# Patient Record
Sex: MALE | Race: WHITE | NOT HISPANIC OR LATINO | Employment: OTHER | URBAN - NONMETROPOLITAN AREA
[De-identification: names, ages, dates, MRNs, and addresses within clinical notes are randomized per-mention and may not be internally consistent; named-entity substitution may affect disease eponyms.]

---

## 2017-09-08 ENCOUNTER — HOSPITAL ENCOUNTER (EMERGENCY)
Facility: HOSPITAL | Age: 47
Discharge: HOME | End: 2017-09-08
Attending: FAMILY MEDICINE | Admitting: FAMILY MEDICINE

## 2017-09-08 ENCOUNTER — APPOINTMENT (OUTPATIENT)
Dept: GENERAL RADIOLOGY | Facility: HOSPITAL | Age: 47
End: 2017-09-08

## 2017-09-08 VITALS
OXYGEN SATURATION: 99 % | DIASTOLIC BLOOD PRESSURE: 72 MMHG | RESPIRATION RATE: 16 BRPM | WEIGHT: 135 LBS | HEIGHT: 67 IN | BODY MASS INDEX: 21.19 KG/M2 | HEART RATE: 80 BPM | TEMPERATURE: 98.3 F | SYSTOLIC BLOOD PRESSURE: 112 MMHG

## 2017-09-08 DIAGNOSIS — S90.31XA CONTUSION OF RIGHT FOOT, INITIAL ENCOUNTER: Primary | ICD-10-CM

## 2017-09-08 PROCEDURE — 99283 EMERGENCY DEPT VISIT LOW MDM: CPT

## 2017-09-08 PROCEDURE — 73630 X-RAY EXAM OF FOOT: CPT

## 2017-09-08 RX ORDER — HYDROCODONE BITARTRATE AND ACETAMINOPHEN 10; 325 MG/1; MG/1
1 TABLET ORAL EVERY 6 HOURS PRN
COMMUNITY
End: 2022-06-01

## 2017-09-08 RX ORDER — ALBUTEROL SULFATE 90 UG/1
2 AEROSOL, METERED RESPIRATORY (INHALATION) EVERY 4 HOURS PRN
COMMUNITY
End: 2022-09-16

## 2017-09-09 NOTE — ED PROVIDER NOTES
Subjective   HPI Comments: Patient presents today for foot injury.  Patient reports that he was carrying a plate when he dropped it onto his right foot.  He states that this happened approximately 7.5 hours ago.  He states that he has taken several of his Norco was to help with the pain.  Patient reports that it is difficult to bear weight on this foot.  The Norco was only helped for a few minutes each time he takes one according to him.  This to be noted that the patient is a terrible historian who is not able to give me a consistent story on any questioning.      History provided by:  Patient   used: No        Review of Systems   Constitutional: Negative for activity change, chills, fatigue and fever.   HENT: Negative for congestion and dental problem.    Eyes: Negative for pain, discharge and itching.   Respiratory: Negative for apnea and chest tightness.    Cardiovascular: Negative for chest pain and palpitations.   Gastrointestinal: Negative for abdominal pain, diarrhea, nausea and vomiting.   Endocrine: Negative for polydipsia and polyuria.   Genitourinary: Negative for difficulty urinating and dysuria.   Musculoskeletal: Negative for arthralgias, neck pain and neck stiffness.        As per the HPI otherwise negative   Neurological: Negative for dizziness and headaches.   Hematological: Negative for adenopathy. Does not bruise/bleed easily.   Psychiatric/Behavioral: Negative for agitation and behavioral problems.   All other systems reviewed and are negative.      Past Medical History:   Diagnosis Date   • AIDS    • Hep C w/ coma, chronic        Allergies   Allergen Reactions   • Bactrim [Sulfamethoxazole-Trimethoprim]    • Other      SOME HIV MEDICATION, DONT KNOW NAME        Past Surgical History:   Procedure Laterality Date   • APPENDECTOMY         History reviewed. No pertinent family history.    Social History     Social History   • Marital status:      Spouse name: N/A   •  "Number of children: N/A   • Years of education: N/A     Social History Main Topics   • Smoking status: Current Every Day Smoker     Packs/day: 1.00   • Smokeless tobacco: None   • Alcohol use No   • Drug use: No   • Sexual activity: Defer     Other Topics Concern   • None     Social History Narrative   • None       Lab Results (last 24 hours)     ** No results found for the last 24 hours. **          Objective   Physical Exam   Constitutional: He is oriented to person, place, and time. He appears well-developed and well-nourished.   HENT:   Head: Normocephalic and atraumatic.   Eyes: Conjunctivae and EOM are normal. Pupils are equal, round, and reactive to light.   Neck: Normal range of motion. Neck supple.   Cardiovascular: Normal rate and regular rhythm.    Pulmonary/Chest: Effort normal and breath sounds normal.   Abdominal: Soft. Bowel sounds are normal.   Musculoskeletal: Normal range of motion. He exhibits tenderness. He exhibits no edema or deformity.   Neurological: He is alert and oriented to person, place, and time.   Skin: Skin is warm and dry.   Nursing note and vitals reviewed.      Procedures         XR Foot 3+ View Right   Final Result   1. No acute bony abnormality is seen.                           This report was finalized on 09/08/2017 18:11 by Dr. Damir Chong MD.          /76 (BP Location: Right arm, Patient Position: Sitting)  Pulse 78  Temp 97.2 °F (36.2 °C) (Temporal Artery )   Resp 16  Ht 67\" (170.2 cm)  Wt 135 lb (61.2 kg)  SpO2 99%  BMI 21.14 kg/m2    ED Course    ED Course       Medications - No data to display         MDM  Number of Diagnoses or Management Options  Contusion of right foot, initial encounter: new and requires workup     Amount and/or Complexity of Data Reviewed  Tests in the radiology section of CPT®: ordered and reviewed  Independent visualization of images, tracings, or specimens: yes    Risk of Complications, Morbidity, and/or Mortality  Presenting " problems: low  Diagnostic procedures: low  Management options: low  General comments: Will follow-up with his primary care provider.  I have given him crutches and an or so she to wear until he is able to follow-up.  I have encouraged him to make sure that he gets a repeat x-ray within the next week.    Patient Progress  Patient progress: stable      Final diagnoses:   Contusion of right foot, initial encounter          Chela Painting,   09/08/17 9143

## 2022-03-25 ENCOUNTER — HOSPITAL ENCOUNTER (EMERGENCY)
Facility: HOSPITAL | Age: 52
Discharge: HOME | End: 2022-03-26
Attending: EMERGENCY MEDICINE | Admitting: EMERGENCY MEDICINE

## 2022-03-25 ENCOUNTER — APPOINTMENT (OUTPATIENT)
Dept: GENERAL RADIOLOGY | Facility: HOSPITAL | Age: 52
End: 2022-03-25

## 2022-03-25 DIAGNOSIS — R07.9 CHEST PAIN, UNSPECIFIED TYPE: Primary | ICD-10-CM

## 2022-03-25 LAB
ALBUMIN SERPL-MCNC: 4.2 G/DL (ref 3.5–5.2)
ALBUMIN/GLOB SERPL: 1.2 G/DL
ALP SERPL-CCNC: 80 U/L (ref 39–117)
ALT SERPL W P-5'-P-CCNC: 52 U/L (ref 1–41)
ANION GAP SERPL CALCULATED.3IONS-SCNC: 11 MMOL/L (ref 5–15)
AST SERPL-CCNC: 40 U/L (ref 1–40)
BASOPHILS # BLD AUTO: 0.09 10*3/MM3 (ref 0–0.2)
BASOPHILS NFR BLD AUTO: 1.1 % (ref 0–1.5)
BILIRUB SERPL-MCNC: 0.3 MG/DL (ref 0–1.2)
BUN SERPL-MCNC: 12 MG/DL (ref 6–20)
BUN/CREAT SERPL: 9.9 (ref 7–25)
CALCIUM SPEC-SCNC: 9.1 MG/DL (ref 8.6–10.5)
CHLORIDE SERPL-SCNC: 105 MMOL/L (ref 98–107)
CO2 SERPL-SCNC: 22 MMOL/L (ref 22–29)
CREAT SERPL-MCNC: 1.21 MG/DL (ref 0.76–1.27)
DEPRECATED RDW RBC AUTO: 46.2 FL (ref 37–54)
EGFRCR SERPLBLD CKD-EPI 2021: 71.6 ML/MIN/1.73
EOSINOPHIL # BLD AUTO: 0.31 10*3/MM3 (ref 0–0.4)
EOSINOPHIL NFR BLD AUTO: 3.8 % (ref 0.3–6.2)
ERYTHROCYTE [DISTWIDTH] IN BLOOD BY AUTOMATED COUNT: 13.7 % (ref 12.3–15.4)
GLOBULIN UR ELPH-MCNC: 3.6 GM/DL
GLUCOSE SERPL-MCNC: 105 MG/DL (ref 65–99)
HCT VFR BLD AUTO: 45 % (ref 37.5–51)
HGB BLD-MCNC: 15.1 G/DL (ref 13–17.7)
IMM GRANULOCYTES # BLD AUTO: 0.02 10*3/MM3 (ref 0–0.05)
IMM GRANULOCYTES NFR BLD AUTO: 0.2 % (ref 0–0.5)
LYMPHOCYTES # BLD AUTO: 3.99 10*3/MM3 (ref 0.7–3.1)
LYMPHOCYTES NFR BLD AUTO: 48.5 % (ref 19.6–45.3)
MCH RBC QN AUTO: 30.8 PG (ref 26.6–33)
MCHC RBC AUTO-ENTMCNC: 33.6 G/DL (ref 31.5–35.7)
MCV RBC AUTO: 91.6 FL (ref 79–97)
MONOCYTES # BLD AUTO: 0.75 10*3/MM3 (ref 0.1–0.9)
MONOCYTES NFR BLD AUTO: 9.1 % (ref 5–12)
NEUTROPHILS NFR BLD AUTO: 3.06 10*3/MM3 (ref 1.7–7)
NEUTROPHILS NFR BLD AUTO: 37.3 % (ref 42.7–76)
NRBC BLD AUTO-RTO: 0 /100 WBC (ref 0–0.2)
PLATELET # BLD AUTO: 214 10*3/MM3 (ref 140–450)
PMV BLD AUTO: 9.3 FL (ref 6–12)
POTASSIUM SERPL-SCNC: 4 MMOL/L (ref 3.5–5.2)
PROT SERPL-MCNC: 7.8 G/DL (ref 6–8.5)
RBC # BLD AUTO: 4.91 10*6/MM3 (ref 4.14–5.8)
SODIUM SERPL-SCNC: 138 MMOL/L (ref 136–145)
TROPONIN T SERPL-MCNC: <0.01 NG/ML (ref 0–0.03)
WBC NRBC COR # BLD: 8.22 10*3/MM3 (ref 3.4–10.8)

## 2022-03-25 PROCEDURE — 71045 X-RAY EXAM CHEST 1 VIEW: CPT

## 2022-03-25 PROCEDURE — 85730 THROMBOPLASTIN TIME PARTIAL: CPT | Performed by: EMERGENCY MEDICINE

## 2022-03-25 PROCEDURE — 93005 ELECTROCARDIOGRAM TRACING: CPT

## 2022-03-25 PROCEDURE — 84484 ASSAY OF TROPONIN QUANT: CPT | Performed by: EMERGENCY MEDICINE

## 2022-03-25 PROCEDURE — 84145 PROCALCITONIN (PCT): CPT | Performed by: EMERGENCY MEDICINE

## 2022-03-25 PROCEDURE — 85610 PROTHROMBIN TIME: CPT | Performed by: EMERGENCY MEDICINE

## 2022-03-25 PROCEDURE — 81003 URINALYSIS AUTO W/O SCOPE: CPT | Performed by: EMERGENCY MEDICINE

## 2022-03-25 PROCEDURE — 83690 ASSAY OF LIPASE: CPT | Performed by: EMERGENCY MEDICINE

## 2022-03-25 PROCEDURE — 80306 DRUG TEST PRSMV INSTRMNT: CPT | Performed by: EMERGENCY MEDICINE

## 2022-03-25 PROCEDURE — 99284 EMERGENCY DEPT VISIT MOD MDM: CPT

## 2022-03-25 PROCEDURE — 85379 FIBRIN DEGRADATION QUANT: CPT | Performed by: EMERGENCY MEDICINE

## 2022-03-25 PROCEDURE — 83880 ASSAY OF NATRIURETIC PEPTIDE: CPT | Performed by: EMERGENCY MEDICINE

## 2022-03-25 PROCEDURE — 82077 ASSAY SPEC XCP UR&BREATH IA: CPT | Performed by: EMERGENCY MEDICINE

## 2022-03-25 PROCEDURE — 99283 EMERGENCY DEPT VISIT LOW MDM: CPT

## 2022-03-25 PROCEDURE — 85025 COMPLETE CBC W/AUTO DIFF WBC: CPT | Performed by: EMERGENCY MEDICINE

## 2022-03-25 PROCEDURE — 86140 C-REACTIVE PROTEIN: CPT | Performed by: EMERGENCY MEDICINE

## 2022-03-25 PROCEDURE — 93010 ELECTROCARDIOGRAM REPORT: CPT | Performed by: INTERNAL MEDICINE

## 2022-03-25 PROCEDURE — 80053 COMPREHEN METABOLIC PANEL: CPT | Performed by: EMERGENCY MEDICINE

## 2022-03-25 PROCEDURE — 93005 ELECTROCARDIOGRAM TRACING: CPT | Performed by: EMERGENCY MEDICINE

## 2022-03-25 RX ORDER — ASPIRIN 81 MG/1
324 TABLET, CHEWABLE ORAL ONCE
Status: COMPLETED | OUTPATIENT
Start: 2022-03-25 | End: 2022-03-25

## 2022-03-25 RX ORDER — SODIUM CHLORIDE 0.9 % (FLUSH) 0.9 %
10 SYRINGE (ML) INJECTION AS NEEDED
Status: DISCONTINUED | OUTPATIENT
Start: 2022-03-25 | End: 2022-03-26 | Stop reason: HOSPADM

## 2022-03-25 RX ADMIN — ASPIRIN 324 MG: 81 TABLET, CHEWABLE ORAL at 23:34

## 2022-03-26 ENCOUNTER — APPOINTMENT (OUTPATIENT)
Dept: CT IMAGING | Facility: HOSPITAL | Age: 52
End: 2022-03-26

## 2022-03-26 VITALS
WEIGHT: 148 LBS | HEIGHT: 67 IN | RESPIRATION RATE: 20 BRPM | TEMPERATURE: 98.1 F | SYSTOLIC BLOOD PRESSURE: 128 MMHG | BODY MASS INDEX: 23.23 KG/M2 | HEART RATE: 69 BPM | OXYGEN SATURATION: 95 % | DIASTOLIC BLOOD PRESSURE: 77 MMHG

## 2022-03-26 LAB
AMPHET+METHAMPHET UR QL: NEGATIVE
AMPHETAMINES UR QL: NEGATIVE
APTT PPP: 25.7 SECONDS (ref 24.1–35)
BARBITURATES UR QL SCN: NEGATIVE
BENZODIAZ UR QL SCN: NEGATIVE
BILIRUB UR QL STRIP: NEGATIVE
BUPRENORPHINE SERPL-MCNC: NEGATIVE NG/ML
CANNABINOIDS SERPL QL: NEGATIVE
CLARITY UR: CLEAR
COCAINE UR QL: NEGATIVE
COLOR UR: YELLOW
CRP SERPL-MCNC: <0.3 MG/DL (ref 0–0.5)
D DIMER PPP FEU-MCNC: 0.62 MG/L (FEU) (ref 0–0.5)
D-LACTATE SERPL-SCNC: 1 MMOL/L (ref 0.5–2)
ETHANOL UR QL: <0.01 %
GLUCOSE UR STRIP-MCNC: NEGATIVE MG/DL
HGB UR QL STRIP.AUTO: NEGATIVE
HOLD SPECIMEN: NORMAL
HOLD SPECIMEN: NORMAL
INR PPP: 0.9 (ref 0.91–1.09)
KETONES UR QL STRIP: NEGATIVE
LEUKOCYTE ESTERASE UR QL STRIP.AUTO: NEGATIVE
LIPASE SERPL-CCNC: 100 U/L (ref 13–60)
METHADONE UR QL SCN: NEGATIVE
NITRITE UR QL STRIP: NEGATIVE
NT-PROBNP SERPL-MCNC: 19.6 PG/ML (ref 0–900)
OPIATES UR QL: NEGATIVE
OXYCODONE UR QL SCN: NEGATIVE
PCP UR QL SCN: NEGATIVE
PH UR STRIP.AUTO: 5.5 [PH] (ref 5–8)
PROCALCITONIN SERPL-MCNC: <0.2 NG/ML (ref 0–0.25)
PROPOXYPH UR QL: NEGATIVE
PROT UR QL STRIP: NEGATIVE
PROTHROMBIN TIME: 11.8 SECONDS (ref 11.9–14.6)
SP GR UR STRIP: 1.01 (ref 1–1.03)
TRICYCLICS UR QL SCN: NEGATIVE
TROPONIN T SERPL-MCNC: <0.01 NG/ML (ref 0–0.03)
UROBILINOGEN UR QL STRIP: NORMAL
WHOLE BLOOD HOLD SPECIMEN: NORMAL
WHOLE BLOOD HOLD SPECIMEN: NORMAL

## 2022-03-26 PROCEDURE — 93010 ELECTROCARDIOGRAM REPORT: CPT | Performed by: INTERNAL MEDICINE

## 2022-03-26 PROCEDURE — 0 IOPAMIDOL PER 1 ML: Performed by: EMERGENCY MEDICINE

## 2022-03-26 PROCEDURE — 83605 ASSAY OF LACTIC ACID: CPT | Performed by: EMERGENCY MEDICINE

## 2022-03-26 PROCEDURE — 71275 CT ANGIOGRAPHY CHEST: CPT

## 2022-03-26 PROCEDURE — 84484 ASSAY OF TROPONIN QUANT: CPT | Performed by: EMERGENCY MEDICINE

## 2022-03-26 PROCEDURE — 93005 ELECTROCARDIOGRAM TRACING: CPT | Performed by: EMERGENCY MEDICINE

## 2022-03-26 RX ADMIN — IOPAMIDOL 100 ML: 755 INJECTION, SOLUTION INTRAVENOUS at 01:19

## 2022-03-28 LAB
QT INTERVAL: 392 MS
QT INTERVAL: 442 MS
QTC INTERVAL: 429 MS
QTC INTERVAL: 430 MS

## 2022-03-30 ENCOUNTER — TRANSCRIBE ORDERS (OUTPATIENT)
Dept: ADMINISTRATIVE | Facility: HOSPITAL | Age: 52
End: 2022-03-30

## 2022-03-30 DIAGNOSIS — B18.2 HEPATITIS C CARRIER: Primary | ICD-10-CM

## 2022-04-01 ENCOUNTER — APPOINTMENT (OUTPATIENT)
Dept: ULTRASOUND IMAGING | Facility: HOSPITAL | Age: 52
End: 2022-04-01

## 2022-04-06 ENCOUNTER — HOSPITAL ENCOUNTER (OUTPATIENT)
Dept: ULTRASOUND IMAGING | Facility: HOSPITAL | Age: 52
Discharge: HOME | End: 2022-04-06
Admitting: PHYSICIAN ASSISTANT

## 2022-04-06 ENCOUNTER — APPOINTMENT (OUTPATIENT)
Dept: ULTRASOUND IMAGING | Facility: HOSPITAL | Age: 52
End: 2022-04-06

## 2022-04-06 DIAGNOSIS — B18.2 HEPATITIS C CARRIER: ICD-10-CM

## 2022-04-06 PROCEDURE — 76981 USE PARENCHYMA: CPT

## 2022-04-06 PROCEDURE — 76705 ECHO EXAM OF ABDOMEN: CPT

## 2022-05-04 ENCOUNTER — OFFICE VISIT (OUTPATIENT)
Dept: CARDIOLOGY | Facility: CLINIC | Age: 52
End: 2022-05-04

## 2022-05-04 VITALS
HEIGHT: 67 IN | BODY MASS INDEX: 23.07 KG/M2 | WEIGHT: 147 LBS | HEART RATE: 76 BPM | DIASTOLIC BLOOD PRESSURE: 70 MMHG | SYSTOLIC BLOOD PRESSURE: 104 MMHG | OXYGEN SATURATION: 98 %

## 2022-05-04 DIAGNOSIS — Z82.49 FAMILY HISTORY OF EARLY CAD: ICD-10-CM

## 2022-05-04 DIAGNOSIS — R07.9 CHEST PAIN IN ADULT: Primary | ICD-10-CM

## 2022-05-04 DIAGNOSIS — R06.09 DOE (DYSPNEA ON EXERTION): ICD-10-CM

## 2022-05-04 DIAGNOSIS — G89.29 CHRONIC MIDLINE LOW BACK PAIN WITHOUT SCIATICA: ICD-10-CM

## 2022-05-04 DIAGNOSIS — M54.50 CHRONIC MIDLINE LOW BACK PAIN WITHOUT SCIATICA: ICD-10-CM

## 2022-05-04 DIAGNOSIS — B20 CURRENTLY ASYMPTOMATIC HIV INFECTION, WITH HISTORY OF HIV-RELATED ILLNESS: ICD-10-CM

## 2022-05-04 DIAGNOSIS — F19.10 IV DRUG ABUSE: ICD-10-CM

## 2022-05-04 DIAGNOSIS — F17.200 SMOKER: ICD-10-CM

## 2022-05-04 PROCEDURE — 99204 OFFICE O/P NEW MOD 45 MIN: CPT | Performed by: INTERNAL MEDICINE

## 2022-05-04 RX ORDER — MULTIVIT WITH MINERALS/LUTEIN
250 TABLET ORAL DAILY
COMMUNITY
End: 2023-01-19

## 2022-05-04 RX ORDER — MULTIPLE VITAMINS W/ MINERALS TAB 9MG-400MCG
1 TAB ORAL DAILY
COMMUNITY

## 2022-05-04 NOTE — PROGRESS NOTES
Alex Ghotra  [id number]  1970  52 y.o.  male    Referring Provider: Alice Anderson MD    Reason for  Visit:  Initial visit for chest pain and shortness of breath     Subjective     Chest pain both with exertion as well as at rest.  Feels episodes of chest pain occur no more often with exertion  Moderate substernal,   Pressure like   Chest pain non pleuritic  Chest pain non positional and non gustatory   Lasts less than 5 minutes    Started more than 3-4 months ago  Occurs once or twice a week on the average but can be variable in frequency  No associated diaphoresis    No associated nausea  No radiation    Relieved with rest or spontaneously  Not positional    No change with intake of food or antacids  No change with breathing  Mild to moderate associated dyspnea    No similar chest pain episodes in the past     Ex IV drug abuse, clean for 90 days   Has HIV undetectable now     No bleeding, excessive bruising, gait instability or fall risks      History of present illness:  Alex Ghotra is a 52 y.o. yo male with IV drug abuse in past smoker who presents today for   Chief Complaint   Patient presents with   • Chest Pain     New- patient states he gets CP from time to time on the left side under his arm. Does have some numbness in his left side fingers.    .    History  Past Medical History:   Diagnosis Date   • AIDS (HCC)    • Hep C w/ coma, chronic    ,   Past Surgical History:   Procedure Laterality Date   • APPENDECTOMY     ,   Family History   Problem Relation Age of Onset   • Heart attack Mother    • Diabetes Mother    • Heart attack Father    • Liver cancer Father    ,   Social History     Tobacco Use   • Smoking status: Current Every Day Smoker     Packs/day: 2.00     Types: Cigarettes   • Smokeless tobacco: Never Used   Vaping Use   • Vaping Use: Never used   Substance Use Topics   • Alcohol use: No   • Drug use: Yes     Types: Methamphetamines   ,     Medications  Current Outpatient  "Medications   Medication Sig Dispense Refill   • Darunavir-Cobicistat (Prezcobix) 800-150 MG per tablet Take  by mouth Daily.     • dolutegravir (Tivicay) 50 MG tablet Take 50 mg by mouth Daily.     • Emtricitabine-Tenofovir AF (Descovy) 200-25 MG per tablet Take  by mouth Daily.     • multivitamin with minerals tablet tablet Take 1 tablet by mouth Daily.     • vitamin C (ASCORBIC ACID) 250 MG tablet Take 250 mg by mouth Daily.     • albuterol (PROVENTIL HFA;VENTOLIN HFA) 108 (90 Base) MCG/ACT inhaler Inhale 2 puffs Every 4 (Four) Hours As Needed for Wheezing.     • HYDROcodone-acetaminophen (NORCO)  MG per tablet Take 1 tablet by mouth Every 6 (Six) Hours As Needed for Moderate Pain .     • ibuprofen (ADVIL,MOTRIN) 400 MG tablet Take 400 mg by mouth Every 6 (Six) Hours As Needed for Mild Pain .     • rilpivirine (EDURANT) 25 MG tablet tablet Take 25 mg by mouth Daily.       No current facility-administered medications for this visit.       Allergies:  Amoxicillin, Bactrim [sulfamethoxazole-trimethoprim], Other, Remeron [mirtazapine], and Ziagen [abacavir]    Review of Systems  Review of Systems   Constitutional: Negative.   HENT: Negative.    Eyes: Negative.    Cardiovascular: Positive for chest pain and dyspnea on exertion. Negative for claudication, cyanosis, irregular heartbeat, leg swelling, near-syncope, orthopnea, palpitations, paroxysmal nocturnal dyspnea and syncope.   Respiratory: Negative.    Endocrine: Negative.    Hematologic/Lymphatic: Negative.    Skin: Negative.    Musculoskeletal: Positive for arthritis and joint pain.   Gastrointestinal: Negative for anorexia.   Genitourinary: Negative.    Neurological: Negative.    Psychiatric/Behavioral: Negative.        Objective     Physical Exam:  /70   Pulse 76   Ht 170.2 cm (67\")   Wt 66.7 kg (147 lb)   SpO2 98%   BMI 23.02 kg/m²     Physical Exam  Constitutional:       Appearance: He is well-developed.   HENT:      Head: Normocephalic. "   Neck:      Vascular: Normal carotid pulses. No carotid bruit or JVD.      Trachea: No tracheal tenderness or tracheal deviation.   Cardiovascular:      Rate and Rhythm: Regular rhythm.      Pulses: Normal pulses.      Heart sounds: Normal heart sounds.   Pulmonary:      Effort: Pulmonary effort is normal.      Breath sounds: No stridor.   Abdominal:      General: There is no distension.      Palpations: Abdomen is soft.      Tenderness: There is no abdominal tenderness.   Musculoskeletal:      Cervical back: No edema.   Skin:     General: Skin is warm.   Neurological:      Mental Status: He is alert.      Cranial Nerves: No cranial nerve deficit.      Sensory: No sensory deficit.   Psychiatric:         Speech: Speech normal.         Behavior: Behavior normal.         Results Review:     ____________________________________________________________________________________________________________________________________________  Health maintenance and recommendations    Low salt/ HTN/ Heart healthy carbohydrate restricted cardiac diet   The patient is advised to reduce or avoid caffeine or other cardiac stimulants.   Minimize or avoid  NSAID-type medications      Monitor for any signs of bleeding including red or dark stools. Fall precautions.   Advised staying uptodate with immunizations per established standard guidelines.    Offered to give patient  a copy of my notes     Questions were encouraged, asked and answered to the patient's  understanding and satisfaction. Questions if any regarding current medications and side effects, need for refills and importance of compliance to medications stressed.    Reviewed available prior notes, consults, prior visits, laboratory findings, radiology and cardiology relevant reports. Updated chart as applicable. I have reviewed the patient's medical history in detail and updated the computerized patient record as relevant.      Updated patient regarding any new or relevant  abnormalities on review of records or any new findings on physical exam. Mentioned to patient about purpose of visit and desirable health short and long term goals and objectives.    Primary to monitor CBC CMP Lipid panel and TSH as applicable    ___________________________________________________________________________________________________________________________________________   Procedures    Assessment/Plan   Diagnoses and all orders for this visit:    1. Chest pain in adult (Primary)  -     Adult Stress Echo W/ Cont or Stress Agent if Necessary Per Protocol; Future    2. Smoker    3. Currently asymptomatic HIV infection, with history of HIV-related illness (HCC)    4. IV drug abuse (HCC)    5. Family history of early CAD    6. TRIANA (dyspnea on exertion)  -     Adult Transthoracic Echo Complete w/ Color, Spectral and Contrast if necessary per protocol; Future    7. Chronic midline low back pain without sciatica          Plan      Orders Placed This Encounter   Procedures   • Adult Transthoracic Echo Complete w/ Color, Spectral and Contrast if necessary per protocol     Myocardial strain to be performed during echocardiogram as long as technically feasible     Standing Status:   Future     Standing Expiration Date:   5/4/2023     Order Specific Question:   Reason for exam?     Answer:   Dyspnea     Order Specific Question:   Release to patient     Answer:   Immediate   • Adult Stress Echo W/ Cont or Stress Agent if Necessary Per Protocol     Standing Status:   Future     Standing Expiration Date:   5/4/2023     Order Specific Question:   What stress agent will be used?     Answer:   Dobutamine     Order Specific Question:   Difficulty walking criteria?     Answer:   Poor Exercise Tolerance     Order Specific Question:   Reason for exam?     Answer:   Chest Pain     Order Specific Question:   Release to patient     Answer:   Immediate        Check BP and heart rates twice daily initially till blood pressures  and heart rates under good control and then at least 3x / week,   If blood pressures continue to be well-controlled then can check week a month  at home and bring a recording for review next visit  If BP >130/85 or < 100/60 persistently over 3 reading 30 mins apart or if heart rates persistently above 100 bpm or less than 55 bpm call sooner for evaluation and advise     I support the patient's decision to take the Covid -19 vaccine   Had required complete course   No major issues   Now fully immunized    Had booster too        Follow up with Liz CHARLES , Hector CHARLES  or myself          Return in about 6 weeks (around 6/15/2022).

## 2022-05-18 ENCOUNTER — TELEPHONE (OUTPATIENT)
Dept: CARDIOLOGY | Facility: CLINIC | Age: 52
End: 2022-05-18

## 2022-05-18 NOTE — TELEPHONE ENCOUNTER
Caller: Alex Ghotra    Relationship: Self    Best call back number: [phone]    What test/procedure requested: STRESS TEST    When is it needed: BY FRIDAY    Where is the test/procedure going to be performed:     Additional information or concerns: PATIENT GOT A LETTER SAYING PRE AUTH WAS NEEDED FOR TEST.

## 2022-05-18 NOTE — TELEPHONE ENCOUNTER
PT called and stated he had two missed calls, if anyone reached out to him he stated he has his phone available now.

## 2022-05-20 ENCOUNTER — OFFICE VISIT (OUTPATIENT)
Dept: GASTROENTEROLOGY | Facility: CLINIC | Age: 52
End: 2022-05-20

## 2022-05-20 VITALS
BODY MASS INDEX: 23.07 KG/M2 | HEART RATE: 83 BPM | DIASTOLIC BLOOD PRESSURE: 78 MMHG | TEMPERATURE: 96.9 F | WEIGHT: 147 LBS | SYSTOLIC BLOOD PRESSURE: 122 MMHG | HEIGHT: 67 IN | OXYGEN SATURATION: 100 %

## 2022-05-20 DIAGNOSIS — Z86.010 HX OF COLONIC POLYP: ICD-10-CM

## 2022-05-20 DIAGNOSIS — R13.14 PHARYNGOESOPHAGEAL DYSPHAGIA: Primary | ICD-10-CM

## 2022-05-20 DIAGNOSIS — Z12.11 ENCOUNTER FOR SCREENING FOR MALIGNANT NEOPLASM OF COLON: ICD-10-CM

## 2022-05-20 PROCEDURE — 99204 OFFICE O/P NEW MOD 45 MIN: CPT | Performed by: NURSE PRACTITIONER

## 2022-05-20 NOTE — PROGRESS NOTES
"[facility] GASTROENTEROLOGY - OFFICE NOTE    5/20/2022    Alex Ghotra   1970    Primary Physician: Alice Anderson MD    Chief Complaint   Patient presents with   • Colonoscopy   • Difficulty Swallowing         HISTORY OF PRESENT ILLNESS    Alex Ghotra is a 52 y.o. male presents with dysphagia. This started 2/2022. He points to the neck area where solid foods will hang. Taking drink does help. He does smoke. His ear has been  \" draining\". He is to see ENT as well.  No heartburn and regurgitation. No weight loss. No fever.       No change in bowel habits. No rectal bleeding.         He had colonoscopy 2008/2013. He thinks had colon polyps 2008.   No family history of colon cancer or polyps.    He saw Dr. Montiel for chest pain and is scheduled for stress echo.     Past Medical History:   Diagnosis Date   • AIDS (HCC)    • Hep C w/ coma, chronic        Past Surgical History:   Procedure Laterality Date   • APPENDECTOMY     • COLONOSCOPY      2013? ([facility]) polyps       Outpatient Medications Marked as Taking for the 5/20/22 encounter (Office Visit) with Latricia Camacho APRN   Medication Sig Dispense Refill   • albuterol (PROVENTIL HFA;VENTOLIN HFA) 108 (90 Base) MCG/ACT inhaler Inhale 2 puffs Every 4 (Four) Hours As Needed for Wheezing.     • Darunavir-Cobicistat (Prezcobix) 800-150 MG per tablet Take  by mouth Daily.     • dolutegravir (Tivicay) 50 MG tablet Take 50 mg by mouth Daily.     • Emtricitabine-Tenofovir AF (Descovy) 200-25 MG per tablet Take  by mouth Daily.     • ibuprofen (ADVIL,MOTRIN) 400 MG tablet Take 400 mg by mouth Every 6 (Six) Hours As Needed for Mild Pain .     • multivitamin with minerals tablet tablet Take 1 tablet by mouth Daily.     • Nutritional Supplements (GUNNAR PO) Take  by mouth Daily.     • rilpivirine (EDURANT) 25 MG tablet tablet Take 25 mg by mouth Daily.     • vitamin C (ASCORBIC ACID) 250 MG tablet Take 250 mg by mouth Daily.         Allergies   Allergen " "Reactions   • Amoxicillin Other (See Comments)     unknown   • Bactrim [Sulfamethoxazole-Trimethoprim]    • Other      SOME HIV MEDICATION, DONT KNOW NAME    • Remeron [Mirtazapine] Other (See Comments)     unknown   • Ziagen [Abacavir] Other (See Comments)     unknown       Social History     Socioeconomic History   • Marital status:    Tobacco Use   • Smoking status: Current Every Day Smoker     Packs/day: 2.00     Types: Cigarettes   • Smokeless tobacco: Never Used   Vaping Use   • Vaping Use: Never used   Substance and Sexual Activity   • Alcohol use: No   • Drug use: Not Currently     Types: Methamphetamines   • Sexual activity: Defer       Family History   Problem Relation Age of Onset   • Heart attack Mother    • Diabetes Mother    • Heart attack Father    • Liver cancer Father    • Colon cancer Neg Hx    • Colon polyps Neg Hx        Review of Systems   Constitutional: Negative for chills, fever and unexpected weight change.   HENT: Positive for trouble swallowing.    Respiratory: Negative for shortness of breath and wheezing.    Cardiovascular: Positive for chest pain. Negative for palpitations.   Gastrointestinal: Negative for abdominal distention, abdominal pain, anal bleeding, blood in stool, constipation, diarrhea, nausea and vomiting.        Vitals:    05/20/22 0831   BP: 122/78   Pulse: 83   Temp: 96.9 °F (36.1 °C)   SpO2: 100%   Weight: 66.7 kg (147 lb)   Height: 170.2 cm (67\")      Body mass index is 23.02 kg/m².    Physical Exam  Vitals reviewed.   Constitutional:       General: He is not in acute distress.  Cardiovascular:      Rate and Rhythm: Normal rate and regular rhythm.      Heart sounds: Normal heart sounds.   Pulmonary:      Effort: Pulmonary effort is normal.      Breath sounds: Normal breath sounds.   Abdominal:      General: Bowel sounds are normal. There is no distension.      Palpations: Abdomen is soft.      Tenderness: There is no abdominal tenderness.   Skin:     General: " Skin is warm and dry.   Neurological:      Mental Status: He is alert.         Results for orders placed or performed during the hospital encounter of 03/25/22   Comprehensive Metabolic Panel    Specimen: Blood   Result Value Ref Range    Glucose 105 (H) 65 - 99 mg/dL    BUN 12 6 - 20 mg/dL    Creatinine 1.21 0.76 - 1.27 mg/dL    Sodium 138 136 - 145 mmol/L    Potassium 4.0 3.5 - 5.2 mmol/L    Chloride 105 98 - 107 mmol/L    CO2 22.0 22.0 - 29.0 mmol/L    Calcium 9.1 8.6 - 10.5 mg/dL    Total Protein 7.8 6.0 - 8.5 g/dL    Albumin 4.20 3.50 - 5.20 g/dL    ALT (SGPT) 52 (H) 1 - 41 U/L    AST (SGOT) 40 1 - 40 U/L    Alkaline Phosphatase 80 39 - 117 U/L    Total Bilirubin 0.3 0.0 - 1.2 mg/dL    Globulin 3.6 gm/dL    A/G Ratio 1.2 g/dL    BUN/Creatinine Ratio 9.9 7.0 - 25.0    Anion Gap 11.0 5.0 - 15.0 mmol/L    eGFR 71.6 >60.0 mL/min/1.73   Troponin    Specimen: Blood   Result Value Ref Range    Troponin T <0.010 0.000 - 0.030 ng/mL   Troponin    Specimen: Blood   Result Value Ref Range    Troponin T <0.010 0.000 - 0.030 ng/mL   CBC Auto Differential    Specimen: Blood   Result Value Ref Range    WBC 8.22 3.40 - 10.80 10*3/mm3    RBC 4.91 4.14 - 5.80 10*6/mm3    Hemoglobin 15.1 13.0 - 17.7 g/dL    Hematocrit 45.0 37.5 - 51.0 %    MCV 91.6 79.0 - 97.0 fL    MCH 30.8 26.6 - 33.0 pg    MCHC 33.6 31.5 - 35.7 g/dL    RDW 13.7 12.3 - 15.4 %    RDW-SD 46.2 37.0 - 54.0 fl    MPV 9.3 6.0 - 12.0 fL    Platelets 214 140 - 450 10*3/mm3    Neutrophil % 37.3 (L) 42.7 - 76.0 %    Lymphocyte % 48.5 (H) 19.6 - 45.3 %    Monocyte % 9.1 5.0 - 12.0 %    Eosinophil % 3.8 0.3 - 6.2 %    Basophil % 1.1 0.0 - 1.5 %    Immature Grans % 0.2 0.0 - 0.5 %    Neutrophils, Absolute 3.06 1.70 - 7.00 10*3/mm3    Lymphocytes, Absolute 3.99 (H) 0.70 - 3.10 10*3/mm3    Monocytes, Absolute 0.75 0.10 - 0.90 10*3/mm3    Eosinophils, Absolute 0.31 0.00 - 0.40 10*3/mm3    Basophils, Absolute 0.09 0.00 - 0.20 10*3/mm3    Immature Grans, Absolute 0.02 0.00 -  0.05 10*3/mm3    nRBC 0.0 0.0 - 0.2 /100 WBC   Protime-INR    Specimen: Blood   Result Value Ref Range    Protime 11.8 (L) 11.9 - 14.6 Seconds    INR 0.90 (L) 0.91 - 1.09   aPTT    Specimen: Blood   Result Value Ref Range    PTT 25.7 24.1 - 35.0 seconds   Urinalysis With Culture If Indicated - Urine, Clean Catch    Specimen: Urine, Clean Catch   Result Value Ref Range    Color, UA Yellow Yellow, Straw    Appearance, UA Clear Clear    pH, UA 5.5 5.0 - 8.0    Specific Gravity, UA 1.009 1.005 - 1.030    Glucose, UA Negative Negative    Ketones, UA Negative Negative    Bilirubin, UA Negative Negative    Blood, UA Negative Negative    Protein, UA Negative Negative    Leuk Esterase, UA Negative Negative    Nitrite, UA Negative Negative    Urobilinogen, UA 0.2 E.U./dL 0.2 - 1.0 E.U./dL   BNP    Specimen: Blood   Result Value Ref Range    proBNP 19.6 0.0 - 900.0 pg/mL   D-dimer, Quantitative    Specimen: Blood   Result Value Ref Range    D-Dimer, Quantitative 0.62 (H) 0.00 - 0.50 mg/L (FEU)   Lactic Acid, Plasma    Specimen: Blood   Result Value Ref Range    Lactate 1.0 0.5 - 2.0 mmol/L   Procalcitonin    Specimen: Blood   Result Value Ref Range    Procalcitonin <0.20 0.00 - 0.25 ng/mL   C-reactive Protein    Specimen: Blood   Result Value Ref Range    C-Reactive Protein <0.30 0.00 - 0.50 mg/dL   Ethanol    Specimen: Blood   Result Value Ref Range    Ethanol % <0.010 %   Urine Drug Screen - Urine, Clean Catch    Specimen: Urine, Clean Catch   Result Value Ref Range    THC, Screen, Urine Negative Negative    Phencyclidine (PCP), Urine Negative Negative    Cocaine Screen, Urine Negative Negative    Methamphetamine, Ur Negative Negative    Opiate Screen Negative Negative    Amphetamine Screen, Urine Negative Negative    Benzodiazepine Screen, Urine Negative Negative    Tricyclic Antidepressants Screen Negative Negative    Methadone Screen, Urine Negative Negative    Barbiturates Screen, Urine Negative Negative    Oxycodone  Screen, Urine Negative Negative    Propoxyphene Screen Negative Negative    Buprenorphine, Screen, Urine Negative Negative   Lipase    Specimen: Blood   Result Value Ref Range    Lipase 100 (H) 13 - 60 U/L   ECG 12 Lead   Result Value Ref Range    QT Interval 392 ms    QTC Interval 429 ms   ECG 12 Lead   Result Value Ref Range    QT Interval 442 ms    QTC Interval 430 ms   Green Top (Gel)   Result Value Ref Range    Extra Tube Hold for add-ons.    Lavender Top   Result Value Ref Range    Extra Tube hold for add-on    Red Top   Result Value Ref Range    Extra Tube Hold for add-ons.    Light Blue Top   Result Value Ref Range    Extra Tube hold for add-on            ASSESSMENT AND PLAN    Assessment & Plan     Diagnoses and all orders for this visit:    1. Pharyngoesophageal dysphagia (Primary)    2. Hx of colonic polyp    3. Encounter for screening for malignant neoplasm of colon      In regards to dysphagia, differential diagnoses discussed.   Recommend cut food small pieces and chew well. I would recommend egd however will need to wait until after his cardiology evaluation is completed. He will contact our office when cardiac eval is completed.       Will plan for colonoscopy as well once cardiac eval is completed.                   Risk, benefits, and alternatives of endoscopy were explained in full.  They understand that there is a risk of bleeding, perforation, and infection.  The risk of perforation goes up with esophageal dilation.  Other options to evaluate UGI complaints could involve barium swallow or UGI series, but these would be diagnostic tests only.  Patient was given time to ask questions.  I answered them to their satisfaction and they are agreeable to proceeding.  All risks, benefits, alternatives, and indications of colonoscopy procedure have been discussed with the patient. Risks to include perforation of the colon requiring possible surgery or colostomy, risk of bleeding from biopsies or removal  of colon tissue, possibility of missing a colon polyp or cancer, or adverse drug reaction.  Benefits to include the diagnosis and management of disease of the colon and rectum. Alternatives to include barium enema, radiographic evaluation, lab testing or no intervention. Pt verbalizes understanding and agrees.               Latricia Camacho, ARACELI

## 2022-05-23 ENCOUNTER — TELEPHONE (OUTPATIENT)
Dept: CARDIOLOGY | Facility: CLINIC | Age: 52
End: 2022-05-23

## 2022-05-23 ENCOUNTER — HOSPITAL ENCOUNTER (OUTPATIENT)
Dept: CARDIOLOGY | Facility: HOSPITAL | Age: 52
Discharge: HOME | End: 2022-05-23
Admitting: INTERNAL MEDICINE

## 2022-05-23 VITALS
HEART RATE: 77 BPM | SYSTOLIC BLOOD PRESSURE: 115 MMHG | BODY MASS INDEX: 23.07 KG/M2 | DIASTOLIC BLOOD PRESSURE: 70 MMHG | HEIGHT: 67 IN | WEIGHT: 147 LBS

## 2022-05-23 DIAGNOSIS — R07.9 CHEST PAIN IN ADULT: ICD-10-CM

## 2022-05-23 PROCEDURE — 93017 CV STRESS TEST TRACING ONLY: CPT

## 2022-05-23 PROCEDURE — 93018 CV STRESS TEST I&R ONLY: CPT | Performed by: INTERNAL MEDICINE

## 2022-05-23 PROCEDURE — 93350 STRESS TTE ONLY: CPT | Performed by: INTERNAL MEDICINE

## 2022-05-23 PROCEDURE — 25010000002 ATROPINE SULFATE: Performed by: INTERNAL MEDICINE

## 2022-05-23 PROCEDURE — 93352 ADMIN ECG CONTRAST AGENT: CPT | Performed by: INTERNAL MEDICINE

## 2022-05-23 PROCEDURE — 93350 STRESS TTE ONLY: CPT

## 2022-05-23 PROCEDURE — 25010000002 PERFLUTREN 6.52 MG/ML SUSPENSION: Performed by: INTERNAL MEDICINE

## 2022-05-23 PROCEDURE — 0 DOBUTAMINE PER 250 MG: Performed by: INTERNAL MEDICINE

## 2022-05-23 RX ORDER — DOBUTAMINE HYDROCHLORIDE 100 MG/100ML
10-50 INJECTION INTRAVENOUS CONTINUOUS
Status: DISCONTINUED | OUTPATIENT
Start: 2022-05-23 | End: 2022-05-24 | Stop reason: HOSPADM

## 2022-05-23 RX ADMIN — ATROPINE SULFATE 0.3 MG: 0.1 INJECTION INTRAVENOUS at 09:14

## 2022-05-23 RX ADMIN — PERFLUTREN 8.48 MG: 6.52 INJECTION, SUSPENSION INTRAVENOUS at 08:46

## 2022-05-23 RX ADMIN — Medication 10 MCG/KG/MIN: at 08:46

## 2022-05-23 NOTE — TELEPHONE ENCOUNTER
Caller: Alex Ghotra    Relationship to patient: Self    Best call back number: [phone]    Type of visit: CARDIAC CLEARANCE    Requested date: ASAP    Additional notes:PT NEEDS CARDIAC CLEARANCE FOR AN COLONOSCOPY & ENDOSCOPY - PT NEEDS CLEARANCE BEFORE THESE TESTS CAN BE SCHEDULED

## 2022-05-24 LAB
BH CV STRESS BP STAGE 1: NORMAL
BH CV STRESS BP STAGE 2: NORMAL
BH CV STRESS BP STAGE 3: NORMAL
BH CV STRESS DOB - ATROPINE STAGE 3: 0.3
BH CV STRESS DOSE DOBUTAMINE STAGE 1: 10
BH CV STRESS DOSE DOBUTAMINE STAGE 2: 20
BH CV STRESS DOSE DOBUTAMINE STAGE 3: 30
BH CV STRESS DURATION MIN STAGE 1: 3
BH CV STRESS DURATION MIN STAGE 2: 3
BH CV STRESS DURATION MIN STAGE 3: 4
BH CV STRESS DURATION SEC STAGE 1: 0
BH CV STRESS DURATION SEC STAGE 2: 0
BH CV STRESS DURATION SEC STAGE 3: 15
BH CV STRESS ECHO POST STRESS EJECTION FRACTION EF: 65 %
BH CV STRESS HR STAGE 1: 71
BH CV STRESS HR STAGE 2: 83
BH CV STRESS HR STAGE 3: 143
BH CV STRESS PROTOCOL 1: NORMAL
BH CV STRESS RECOVERY BP: NORMAL MMHG
BH CV STRESS RECOVERY HR: 94 BPM
BH CV STRESS STAGE 1: 1
BH CV STRESS STAGE 2: 2
BH CV STRESS STAGE 3: 3
LV EF 2D ECHO EST: 55 %
MAXIMAL PREDICTED HEART RATE: 167 BPM
PERCENT MAX PREDICTED HR: 85.63 %
STRESS BASELINE BP: NORMAL MMHG
STRESS BASELINE HR: 74 BPM
STRESS PERCENT HR: 101 %
STRESS POST EXERCISE DUR MIN: 10 MIN
STRESS POST EXERCISE DUR SEC: 15 SEC
STRESS POST PEAK BP: NORMAL MMHG
STRESS POST PEAK HR: 143 BPM
STRESS TARGET HR: 142 BPM

## 2022-05-27 NOTE — TELEPHONE ENCOUNTER
CARDIAC CLEARANCE LETTER SIGNED & SCANNED TO CHART. WILL FORWARD TO ARACELI ERNANDEZ @ [facility]

## 2022-05-31 ENCOUNTER — PREP FOR SURGERY (OUTPATIENT)
Dept: OTHER | Facility: HOSPITAL | Age: 52
End: 2022-05-31

## 2022-05-31 DIAGNOSIS — Z86.010 HX OF COLONIC POLYP: ICD-10-CM

## 2022-05-31 DIAGNOSIS — R13.14 PHARYNGOESOPHAGEAL DYSPHAGIA: Primary | ICD-10-CM

## 2022-05-31 NOTE — TELEPHONE ENCOUNTER
Let patient know that Dr. Montiel approves endoscopic procedures from a cardiac perspective.  I will put in case request for EGD and colonoscopy, MiraLAX prep this time.

## 2022-05-31 NOTE — TELEPHONE ENCOUNTER
Spoke with PT.  Will mail him prep instructions.    He is scheduled for endo/colon 6-9-22.  PT verbalized understanding getting the COVID test 72 hrs prior to procedure and that he needs a .

## 2022-06-01 PROBLEM — R13.14 PHARYNGOESOPHAGEAL DYSPHAGIA: Status: ACTIVE | Noted: 2022-06-01

## 2022-06-01 PROBLEM — Z86.010 HX OF COLONIC POLYP: Status: ACTIVE | Noted: 2022-06-01

## 2022-06-01 PROBLEM — Z86.0100 HX OF COLONIC POLYP: Status: ACTIVE | Noted: 2022-06-01

## 2022-06-01 PROCEDURE — 87635 SARS-COV-2 COVID-19 AMP PRB: CPT | Performed by: NURSE PRACTITIONER

## 2022-06-03 DIAGNOSIS — Z01.818 PREOPERATIVE TESTING: Primary | ICD-10-CM

## 2022-06-06 ENCOUNTER — LAB (OUTPATIENT)
Dept: LAB | Facility: HOSPITAL | Age: 52
End: 2022-06-06

## 2022-06-06 LAB — SARS-COV-2 ORF1AB RESP QL NAA+PROBE: NOT DETECTED

## 2022-06-06 PROCEDURE — C9803 HOPD COVID-19 SPEC COLLECT: HCPCS

## 2022-06-06 PROCEDURE — U0004 COV-19 TEST NON-CDC HGH THRU: HCPCS | Performed by: NURSE PRACTITIONER

## 2022-06-09 ENCOUNTER — HOSPITAL ENCOUNTER (OUTPATIENT)
Facility: HOSPITAL | Age: 52
Setting detail: HOSPITAL OUTPATIENT SURGERY
Discharge: HOME | End: 2022-06-09
Attending: INTERNAL MEDICINE | Admitting: INTERNAL MEDICINE

## 2022-06-09 ENCOUNTER — TELEPHONE (OUTPATIENT)
Dept: GASTROENTEROLOGY | Facility: CLINIC | Age: 52
End: 2022-06-09

## 2022-06-09 ENCOUNTER — ANESTHESIA (OUTPATIENT)
Dept: GASTROENTEROLOGY | Facility: HOSPITAL | Age: 52
End: 2022-06-09

## 2022-06-09 ENCOUNTER — ANESTHESIA EVENT (OUTPATIENT)
Dept: GASTROENTEROLOGY | Facility: HOSPITAL | Age: 52
End: 2022-06-09

## 2022-06-09 VITALS
TEMPERATURE: 97.5 F | RESPIRATION RATE: 21 BRPM | BODY MASS INDEX: 21.5 KG/M2 | HEART RATE: 73 BPM | DIASTOLIC BLOOD PRESSURE: 63 MMHG | OXYGEN SATURATION: 100 % | SYSTOLIC BLOOD PRESSURE: 88 MMHG | HEIGHT: 67 IN | WEIGHT: 137 LBS

## 2022-06-09 DIAGNOSIS — Z86.010 HX OF COLONIC POLYP: ICD-10-CM

## 2022-06-09 DIAGNOSIS — R13.14 PHARYNGOESOPHAGEAL DYSPHAGIA: ICD-10-CM

## 2022-06-09 LAB — KOH PREP NAIL: ABNORMAL

## 2022-06-09 PROCEDURE — 25010000002 PROPOFOL 10 MG/ML EMULSION: Performed by: NURSE ANESTHETIST, CERTIFIED REGISTERED

## 2022-06-09 PROCEDURE — 87220 TISSUE EXAM FOR FUNGI: CPT | Performed by: INTERNAL MEDICINE

## 2022-06-09 PROCEDURE — 45378 DIAGNOSTIC COLONOSCOPY: CPT | Performed by: INTERNAL MEDICINE

## 2022-06-09 PROCEDURE — 43248 EGD GUIDE WIRE INSERTION: CPT | Performed by: INTERNAL MEDICINE

## 2022-06-09 RX ORDER — LIDOCAINE HYDROCHLORIDE 20 MG/ML
INJECTION, SOLUTION EPIDURAL; INFILTRATION; INTRACAUDAL; PERINEURAL AS NEEDED
Status: DISCONTINUED | OUTPATIENT
Start: 2022-06-09 | End: 2022-06-09 | Stop reason: SURG

## 2022-06-09 RX ORDER — SODIUM CHLORIDE 0.9 % (FLUSH) 0.9 %
10 SYRINGE (ML) INJECTION AS NEEDED
Status: DISCONTINUED | OUTPATIENT
Start: 2022-06-09 | End: 2022-06-09 | Stop reason: HOSPADM

## 2022-06-09 RX ORDER — FLUCONAZOLE 100 MG/1
100 TABLET ORAL DAILY
Qty: 5 TABLET | Refills: 0 | Status: SHIPPED | OUTPATIENT
Start: 2022-06-09 | End: 2022-06-16

## 2022-06-09 RX ORDER — PROPOFOL 10 MG/ML
VIAL (ML) INTRAVENOUS AS NEEDED
Status: DISCONTINUED | OUTPATIENT
Start: 2022-06-09 | End: 2022-06-09 | Stop reason: SURG

## 2022-06-09 RX ORDER — SODIUM CHLORIDE 9 MG/ML
500 INJECTION, SOLUTION INTRAVENOUS CONTINUOUS PRN
Status: DISCONTINUED | OUTPATIENT
Start: 2022-06-09 | End: 2022-06-09 | Stop reason: HOSPADM

## 2022-06-09 RX ORDER — LIDOCAINE HYDROCHLORIDE 10 MG/ML
0.5 INJECTION, SOLUTION EPIDURAL; INFILTRATION; INTRACAUDAL; PERINEURAL ONCE AS NEEDED
Status: CANCELLED | OUTPATIENT
Start: 2022-06-09

## 2022-06-09 RX ORDER — ONDANSETRON 2 MG/ML
4 INJECTION INTRAMUSCULAR; INTRAVENOUS ONCE AS NEEDED
Status: DISCONTINUED | OUTPATIENT
Start: 2022-06-09 | End: 2022-06-09 | Stop reason: HOSPADM

## 2022-06-09 RX ADMIN — LIDOCAINE HYDROCHLORIDE 100 MG: 20 INJECTION, SOLUTION EPIDURAL; INFILTRATION; INTRACAUDAL; PERINEURAL at 11:18

## 2022-06-09 RX ADMIN — SODIUM CHLORIDE 500 ML: 9 INJECTION, SOLUTION INTRAVENOUS at 09:10

## 2022-06-09 RX ADMIN — PROPOFOL 400 MG: 10 INJECTION, EMULSION INTRAVENOUS at 11:18

## 2022-06-09 NOTE — INTERVAL H&P NOTE
H&P reviewed. The patient was examined and there are no changes to the H&P.      He presents for endoscopy and colonoscopy.  He states he has dysphagia occasionally to solid foods.  Denies any heartburn.  Okay to proceed with endoscopy and colonoscopy

## 2022-06-09 NOTE — ANESTHESIA PREPROCEDURE EVALUATION
Anesthesia Evaluation     Patient summary reviewed and Nursing notes reviewed   no history of anesthetic complications:  NPO Solid Status: > 8 hours  NPO Liquid Status: > 2 hours           Airway   Mallampati: I  TM distance: >3 FB  Neck ROM: full  No difficulty expected  Dental    (+) upper dentures    Pulmonary    (+) a smoker Current,   Cardiovascular   Exercise tolerance: good (4-7 METS)    (-) hypertension, CAD      Neuro/Psych  (-) seizures, TIA, CVA  GI/Hepatic/Renal/Endo    (+)   hepatitis C,     ROS Comment: HIV     Musculoskeletal     Abdominal    Substance History      OB/GYN          Other                        Anesthesia Plan    ASA 2     MAC     intravenous induction     Anesthetic plan, risks, benefits, and alternatives have been provided, discussed and informed consent has been obtained with: patient.        CODE STATUS:

## 2022-06-09 NOTE — ANESTHESIA POSTPROCEDURE EVALUATION
"Patient: Alex Ghotra    Procedure Summary     Date: 06/09/22 Room / Location: [facility] ENDOSCOPY 2 / [facility]OSCOPY    Anesthesia Start: 1114 Anesthesia Stop: 1148    Procedures:       ESOPHAGOGASTRODUODENOSCOPY WITH ANESTHESIA (N/A )      COLONOSCOPY WITH ANESTHESIA (N/A ) Diagnosis:       Pharyngoesophageal dysphagia      Hx of colonic polyp      (Pharyngoesophageal dysphagia [R13.14])      (Hx of colonic polyp [Z86.010])    Surgeons: Michael Israel MD Provider: Balaji Brody CRNA    Anesthesia Type: MAC ASA Status: 2          Anesthesia Type: MAC    Vitals  Vitals Value Taken Time   BP 99/77 06/09/22 1211   Temp     Pulse 76 06/09/22 1212   Resp 21 06/09/22 1205   SpO2 98 % 06/09/22 1212   Vitals shown include unvalidated device data.        Post Anesthesia Care and Evaluation    Patient location during evaluation: PACU  Patient participation: complete - patient participated  Level of consciousness: awake and alert  Pain management: adequate    Airway patency: patent  Anesthetic complications: No anesthetic complications    Cardiovascular status: acceptable  Respiratory status: acceptable  Hydration status: acceptable    Comments: Blood pressure (!) 88/63, pulse 73, temperature 97.5 °F (36.4 °C), temperature source Temporal, resp. rate 21, height 170.2 cm (67\"), weight 62.1 kg (137 lb), SpO2 100 %.    Pt discharged from PACU based on evaristo score >8      "

## 2022-06-09 NOTE — TELEPHONE ENCOUNTER
Please call him and let him know that the test did show that he has a mild yeast infection in his esophagus.  I sent a prescription for Diflucan 100 mg to be taken once daily for 7 days to his pharmacy.  He should pick that up and begin with it.  She continue follow-up with Dr. Felix.  Come see us as needed

## 2022-06-10 ENCOUNTER — TELEPHONE (OUTPATIENT)
Dept: GASTROENTEROLOGY | Facility: CLINIC | Age: 52
End: 2022-06-10

## 2022-06-21 NOTE — PROGRESS NOTES
Subjective    Mr. Ghotra is 52 y.o. male    Chief Complaint: Undescended Testicle     History of Present Illness  52-year-old male new patient with HIV referred for worsening difficulty maintaining erections.  He has been using sildenafil but only at the 50 mg dosing.  Is also having difficulty achieving climax.  He is also having difficulty with retraction of his right testicle due to right inguinal canal during intercourse.  He states he had previous better results with Cialis.  He denies bothersome LUTS, hematuria, or dysuria.  He is unsure whether he has had his PSA tested.    The following portions of the patient's history were reviewed and updated as appropriate: allergies, current medications, past family history, past medical history, past social history, past surgical history and problem list.    Review of Systems      Current Outpatient Medications:   •  albuterol (PROVENTIL HFA;VENTOLIN HFA) 108 (90 Base) MCG/ACT inhaler, Inhale 2 puffs Every 4 (Four) Hours As Needed for Wheezing., Disp: , Rfl:   •  Cholecalciferol (Vitamin D3) 50 MCG (2000 UT) capsule, TAKE 1 CAPSULE BY MOUTH DAILY WITH A MEAL, Disp: , Rfl:   •  darunavir ethanolate (PREZISTA) 800 MG tablet tablet, Take 800 mg by mouth., Disp: , Rfl:   •  Darunavir-Cobicistat (Prezcobix) 800-150 MG per tablet, Take  by mouth Daily., Disp: , Rfl:   •  dolutegravir (Tivicay) 50 MG tablet, Take 50 mg by mouth Daily., Disp: , Rfl:   •  Emtricitabine-Tenofovir AF (Descovy) 200-25 MG per tablet, Take  by mouth Daily., Disp: , Rfl:   •  etravirine (INTELENCE) 100 MG tablet, Take  by mouth., Disp: , Rfl:   •  guaifenesin (ROBITUSSIN) 100 MG/5ML liquid, Take 10 mL by mouth 3 (Three) Times a Day As Needed for Cough or Congestion., Disp: 118 mL, Rfl: 0  •  ibuprofen (ADVIL,MOTRIN) 400 MG tablet, Take 400 mg by mouth Every 6 (Six) Hours As Needed for Mild Pain ., Disp: , Rfl:   •  ibuprofen (ADVIL,MOTRIN) 800 MG tablet, Take 800 mg by mouth Daily As Needed., Disp: ,  Rfl:   •  Ipratropium-Albuterol (COMBIVENT IN), Inhale 2 puffs., Disp: , Rfl:   •  multivitamin with minerals tablet tablet, Take 1 tablet by mouth Daily., Disp: , Rfl:   •  Nutritional Supplements (GUNNAR PO), Take  by mouth Daily., Disp: , Rfl:   •  rilpivirine (EDURANT) 25 MG tablet tablet, Take 25 mg by mouth Daily., Disp: , Rfl:   •  vitamin C (ASCORBIC ACID) 250 MG tablet, Take 250 mg by mouth Daily., Disp: , Rfl:     Past Medical History:   Diagnosis Date   • AIDS (HCC)    • Asthma    • Hep C w/ coma, chronic        Past Surgical History:   Procedure Laterality Date   • APPENDECTOMY     • COLONOSCOPY      2013? ([facility]) polyps   • COLONOSCOPY N/A 6/9/2022    Procedure: COLONOSCOPY WITH ANESTHESIA;  Surgeon: Michael Israel MD;  Location: [facility] ENDOSCOPY;  Service: Gastroenterology;  Laterality: N/A;  pre hx colon polyp  post; normal  Alice Anderson MD   • ENDOSCOPY     • ENDOSCOPY N/A 6/9/2022    Procedure: ESOPHAGOGASTRODUODENOSCOPY WITH ANESTHESIA;  Surgeon: Michael Israel MD;  Location: [facility] ENDOSCOPY;  Service: Gastroenterology;  Laterality: N/A;  pre dysphagia  post candidiasis; stricture dilated  Alice Anderson MD       Social History     Socioeconomic History   • Marital status:    Tobacco Use   • Smoking status: Current Every Day Smoker     Packs/day: 2.00     Types: Cigarettes   • Smokeless tobacco: Never Used   Vaping Use   • Vaping Use: Never used   Substance and Sexual Activity   • Alcohol use: No   • Drug use: Not Currently     Types: Methamphetamines   • Sexual activity: Defer       Family History   Problem Relation Age of Onset   • Heart attack Mother    • Diabetes Mother    • Heart attack Father    • Liver cancer Father    • Colon cancer Neg Hx    • Colon polyps Neg Hx        Objective    There were no vitals taken for this visit.    Physical Exam  Penis and testicles are normal.  No masses.  No inguinal hernia appreciable.      Results for orders placed or  performed during the hospital encounter of 06/09/22   KOH Prep - Brushing, Esophagus    Specimen: Esophagus; Brushing   Result Value Ref Range    KOH Prep Yeast seen (A) No yeast or hyphal elements seen     Assessment and Plan    Diagnoses and all orders for this visit:    1. Erectile dysfunction due to arterial insufficiency (Primary)  -     tadalafil (Cialis) 20 MG tablet; 1/2 to 1 tab by mouth 1 hour prior to intercourse  Dispense: 30 tablet; Refill: 11    2. Retractile testis  -     POC Urinalysis Dipstick, Multipro      We discussed options for titrating the sildenafil up to 100 mg dosing but he would like to have another trial of tadalafil given previous better results.  We discussed the nonneurologic nature difficulty achieving climax and that this is likely related to his multiple medications and/or difficulty with tumescence.  I recommended observation for his right retractile testicle was he would like to have it removed.  We discussed other options for erectile dysfunction including penile injections, intraurethral medications, LEONEL, and penile implant.  He would like a trial of tadalafil.  He understands that he may take no nitrates.  He will follow-up with me on as-needed basis.  He will check with his primary care on whether his PSA has been tested.      This document has been signed by ANJELICA Gregory MD on June 25, 2022 20:40 CDT

## 2022-06-24 ENCOUNTER — OFFICE VISIT (OUTPATIENT)
Dept: UROLOGY | Facility: CLINIC | Age: 52
End: 2022-06-24

## 2022-06-24 VITALS — WEIGHT: 146.6 LBS | TEMPERATURE: 98.1 F | BODY MASS INDEX: 23.01 KG/M2 | HEIGHT: 67 IN

## 2022-06-24 DIAGNOSIS — Q55.22 RETRACTILE TESTIS: ICD-10-CM

## 2022-06-24 DIAGNOSIS — N52.01 ERECTILE DYSFUNCTION DUE TO ARTERIAL INSUFFICIENCY: Primary | ICD-10-CM

## 2022-06-24 LAB
BILIRUB BLD-MCNC: NEGATIVE MG/DL
CLARITY, POC: CLEAR
COLOR UR: YELLOW
GLUCOSE UR STRIP-MCNC: NEGATIVE MG/DL
KETONES UR QL: NEGATIVE
LEUKOCYTE EST, POC: ABNORMAL
NITRITE UR-MCNC: NEGATIVE MG/ML
PH UR: 5.5 [PH] (ref 5–8)
PROT UR STRIP-MCNC: NEGATIVE MG/DL
RBC # UR STRIP: NEGATIVE /UL
SP GR UR: 1.02 (ref 1–1.03)
UROBILINOGEN UR QL: NORMAL

## 2022-06-24 PROCEDURE — 99204 OFFICE O/P NEW MOD 45 MIN: CPT | Performed by: UROLOGY

## 2022-06-24 RX ORDER — TADALAFIL 20 MG/1
TABLET ORAL
Qty: 30 TABLET | Refills: 11 | Status: SHIPPED | OUTPATIENT
Start: 2022-06-24 | End: 2023-01-19

## 2022-06-27 ENCOUNTER — HOSPITAL ENCOUNTER (OUTPATIENT)
Dept: CARDIOLOGY | Facility: HOSPITAL | Age: 52
Discharge: HOME | End: 2022-06-27
Admitting: INTERNAL MEDICINE

## 2022-06-27 DIAGNOSIS — R06.09 DOE (DYSPNEA ON EXERTION): ICD-10-CM

## 2022-06-27 PROCEDURE — 93306 TTE W/DOPPLER COMPLETE: CPT

## 2022-06-27 PROCEDURE — 93356 MYOCRD STRAIN IMG SPCKL TRCK: CPT

## 2022-06-27 PROCEDURE — 93356 MYOCRD STRAIN IMG SPCKL TRCK: CPT | Performed by: INTERNAL MEDICINE

## 2022-06-27 PROCEDURE — 93306 TTE W/DOPPLER COMPLETE: CPT | Performed by: INTERNAL MEDICINE

## 2022-06-29 ENCOUNTER — TRANSCRIBE ORDERS (OUTPATIENT)
Dept: LAB | Facility: HOSPITAL | Age: 52
End: 2022-06-29

## 2022-06-29 ENCOUNTER — TRANSCRIBE ORDERS (OUTPATIENT)
Dept: ADMINISTRATIVE | Facility: HOSPITAL | Age: 52
End: 2022-06-29

## 2022-06-29 DIAGNOSIS — Z20.822 ENCOUNTER FOR PREOPERATIVE SCREENING LABORATORY TESTING FOR COVID-19 VIRUS: Primary | ICD-10-CM

## 2022-06-29 DIAGNOSIS — J45.909 ASTHMA, ACUTE: Primary | ICD-10-CM

## 2022-06-29 DIAGNOSIS — Z01.812 ENCOUNTER FOR PREOPERATIVE SCREENING LABORATORY TESTING FOR COVID-19 VIRUS: Primary | ICD-10-CM

## 2022-06-29 LAB
BH CV ECHO LEFT VENTRICLE GLOBAL LONGITUDINAL STRAIN: -11 %
BH CV ECHO MEAS - ACS: 2 CM
BH CV ECHO MEAS - AO MAX PG: 4.9 MMHG
BH CV ECHO MEAS - AO MEAN PG: 3 MMHG
BH CV ECHO MEAS - AO ROOT DIAM: 2.5 CM
BH CV ECHO MEAS - AO V2 MAX: 111 CM/SEC
BH CV ECHO MEAS - AO V2 VTI: 23.7 CM
BH CV ECHO MEAS - AVA(I,D): 1.75 CM2
BH CV ECHO MEAS - EDV(CUBED): 107.2 ML
BH CV ECHO MEAS - EDV(MOD-SP2): 73.5 ML
BH CV ECHO MEAS - EDV(MOD-SP4): 64.8 ML
BH CV ECHO MEAS - EF(MOD-BP): 56.5 %
BH CV ECHO MEAS - EF(MOD-SP2): 58.6 %
BH CV ECHO MEAS - EF(MOD-SP4): 58.6 %
BH CV ECHO MEAS - ESV(CUBED): 34 ML
BH CV ECHO MEAS - ESV(MOD-SP2): 30.4 ML
BH CV ECHO MEAS - ESV(MOD-SP4): 26.8 ML
BH CV ECHO MEAS - FS: 31.8 %
BH CV ECHO MEAS - IVS/LVPW: 0.97 CM
BH CV ECHO MEAS - IVSD: 0.82 CM
BH CV ECHO MEAS - LA DIMENSION: 2.5 CM
BH CV ECHO MEAS - LAT PEAK E' VEL: 11.5 CM/SEC
BH CV ECHO MEAS - LV DIASTOLIC VOL/BSA (35-75): 36.6 CM2
BH CV ECHO MEAS - LV MASS(C)D: 130.9 GRAMS
BH CV ECHO MEAS - LV MAX PG: 2.06 MMHG
BH CV ECHO MEAS - LV MEAN PG: 1 MMHG
BH CV ECHO MEAS - LV SYSTOLIC VOL/BSA (12-30): 15.2 CM2
BH CV ECHO MEAS - LV V1 MAX: 71.7 CM/SEC
BH CV ECHO MEAS - LV V1 VTI: 14.6 CM
BH CV ECHO MEAS - LVIDD: 4.8 CM
BH CV ECHO MEAS - LVIDS: 3.2 CM
BH CV ECHO MEAS - LVOT AREA: 2.8 CM2
BH CV ECHO MEAS - LVOT DIAM: 1.9 CM
BH CV ECHO MEAS - LVPWD: 0.84 CM
BH CV ECHO MEAS - MED PEAK E' VEL: 7.4 CM/SEC
BH CV ECHO MEAS - MV A MAX VEL: 62.1 CM/SEC
BH CV ECHO MEAS - MV DEC SLOPE: 429.5 CM/SEC2
BH CV ECHO MEAS - MV DEC TIME: 0.18 MSEC
BH CV ECHO MEAS - MV E MAX VEL: 83.9 CM/SEC
BH CV ECHO MEAS - MV E/A: 1.35
BH CV ECHO MEAS - MV MAX PG: 3.5 MMHG
BH CV ECHO MEAS - MV MEAN PG: 1 MMHG
BH CV ECHO MEAS - MV P1/2T: 62.9 MSEC
BH CV ECHO MEAS - MV V2 VTI: 28 CM
BH CV ECHO MEAS - MVA(P1/2T): 3.5 CM2
BH CV ECHO MEAS - MVA(VTI): 1.48 CM2
BH CV ECHO MEAS - RVDD: 2.8 CM
BH CV ECHO MEAS - SI(MOD-SP2): 24.4 ML/M2
BH CV ECHO MEAS - SI(MOD-SP4): 21.5 ML/M2
BH CV ECHO MEAS - SV(LVOT): 41.4 ML
BH CV ECHO MEAS - SV(MOD-SP2): 43.1 ML
BH CV ECHO MEAS - SV(MOD-SP4): 38 ML
BH CV ECHO MEAS - TAPSE (>1.6): 2.44 CM
BH CV ECHO MEAS - TR MAX VEL: 150 CM/SEC
BH CV ECHO MEASUREMENTS AVERAGE E/E' RATIO: 8.88
BH CV XLRA - TDI S': 10.6 CM/SEC
LEFT ATRIUM VOLUME INDEX: 18.2 ML/M2
LEFT ATRIUM VOLUME: 32.2 ML
MAXIMAL PREDICTED HEART RATE: 167 BPM
STRESS TARGET HR: 142 BPM

## 2022-06-30 ENCOUNTER — TELEPHONE (OUTPATIENT)
Dept: CARDIOLOGY | Facility: CLINIC | Age: 52
End: 2022-06-30

## 2022-07-13 DIAGNOSIS — Z01.818 PRE-OPERATIVE EXAMINATION: Primary | ICD-10-CM

## 2022-07-19 ENCOUNTER — LAB (OUTPATIENT)
Dept: LAB | Facility: HOSPITAL | Age: 52
End: 2022-07-19

## 2022-07-19 DIAGNOSIS — Z01.818 PRE-OPERATIVE EXAMINATION: ICD-10-CM

## 2022-07-19 LAB — SARS-COV-2 ORF1AB RESP QL NAA+PROBE: NOT DETECTED

## 2022-07-19 PROCEDURE — C9803 HOPD COVID-19 SPEC COLLECT: HCPCS

## 2022-07-19 PROCEDURE — U0004 COV-19 TEST NON-CDC HGH THRU: HCPCS

## 2022-07-20 NOTE — PROGRESS NOTES
YOB: 1970  Location: [facility]  Location Address: [address] [place] [address], [facility], [address] [place] [address]  Location Phone: [phone]    Chief Complaint   Patient presents with   • Difficulty Swallowing   • Ear Problem     Drainage left ear        History of Present Illness  Alex Ghotra is a 52 y.o. male.  Alex Ghotra is here for evaluation of ENT complaints. The patient has had problems with left ear drainage and history of perforation and difficulty swallowing   Patient states in February he began having difficulty swallowing as well as feeling as if something is stuck in his throat.   He had endoscopy in  with Dr. Israel and was found to have yeast infection in esophagus. He has been treated with diflucan but states symptoms have been unchanged.   He has a history of reflux but states he stopped using his reflux medications as he was told he can take these as needed due to being on other medications for hiv     Patient has a history of iv drug use and marijuana use that he stopped in January  He smokes   1 - 1.5 ppd and is not ready to quit at this time        Past Medical History:   Diagnosis Date   • AIDS (HCC)    • Asthma    • Hep C w/ coma, chronic        Past Surgical History:   Procedure Laterality Date   • APPENDECTOMY     • COLONOSCOPY      ? ([facility]) polyps   • COLONOSCOPY N/A 2022    Procedure: COLONOSCOPY WITH ANESTHESIA;  Surgeon: Michael Israel MD;  Location: [facility] ENDOSCOPY;  Service: Gastroenterology;  Laterality: N/A;  pre hx colon polyp  post; normal  Alice Anderson MD   • ENDOSCOPY     • ENDOSCOPY N/A 2022    Procedure: ESOPHAGOGASTRODUODENOSCOPY WITH ANESTHESIA;  Surgeon: Michael Israel MD;  Location: [facility] ENDOSCOPY;  Service: Gastroenterology;  Laterality: N/A;  pre dysphagia  post candidiasis; stricture dilated  Alice Anderson MD   • MANDIBLE FRACTURE SURGERY         Outpatient Medications Marked as Taking for  the 7/21/22 encounter (Office Visit) with Duke Camacho MD   Medication Sig Dispense Refill   • albuterol (PROVENTIL HFA;VENTOLIN HFA) 108 (90 Base) MCG/ACT inhaler Inhale 2 puffs Every 4 (Four) Hours As Needed for Wheezing.     • Cholecalciferol (Vitamin D3) 50 MCG (2000 UT) capsule TAKE 1 CAPSULE BY MOUTH DAILY WITH A MEAL     • darunavir ethanolate (PREZISTA) 800 MG tablet tablet Take 800 mg by mouth.     • Darunavir-Cobicistat (Prezcobix) 800-150 MG per tablet Take  by mouth Daily.     • dolutegravir (Tivicay) 50 MG tablet Take 50 mg by mouth Daily.     • Emtricitabine-Tenofovir AF (Descovy) 200-25 MG per tablet Take  by mouth Daily.     • etravirine (INTELENCE) 100 MG tablet Take  by mouth.     • ibuprofen (ADVIL,MOTRIN) 800 MG tablet Take 800 mg by mouth Daily As Needed.     • Ipratropium-Albuterol (COMBIVENT IN) Inhale 2 puffs.     • miSOPROStol (CYTOTEC) 200 MCG tablet Take 200 mcg by mouth 4 (Four) Times a Day.     • multivitamin with minerals tablet tablet Take 1 tablet by mouth Daily.     • Nutritional Supplements (GUNNAR PO) Take  by mouth Daily.     • rilpivirine (EDURANT) 25 MG tablet tablet Take 25 mg by mouth Daily.     • sertraline (ZOLOFT) 25 MG tablet Take 25 mg by mouth Daily.     • tadalafil (Cialis) 20 MG tablet 1/2 to 1 tab by mouth 1 hour prior to intercourse 30 tablet 11   • vitamin C (ASCORBIC ACID) 250 MG tablet Take 250 mg by mouth Daily.         Amoxicillin, Bactrim [sulfamethoxazole-trimethoprim], Other, Remeron [mirtazapine], and Ziagen [abacavir]    Family History   Problem Relation Age of Onset   • Heart attack Mother    • Diabetes Mother    • Heart attack Father    • Liver cancer Father    • Colon cancer Neg Hx    • Colon polyps Neg Hx        Social History     Socioeconomic History   • Marital status:    Tobacco Use   • Smoking status: Current Every Day Smoker     Packs/day: 1.50     Types: Cigarettes   • Smokeless tobacco: Never Used   Vaping Use   • Vaping Use:  Never used   Substance and Sexual Activity   • Alcohol use: No   • Drug use: Not Currently     Types: Methamphetamines   • Sexual activity: Defer       Review of Systems   Constitutional: Negative.    HENT: Positive for ear discharge, hearing loss and trouble swallowing. Negative for sore throat and voice change.    Eyes: Negative.    Respiratory: Positive for cough.    Cardiovascular: Negative.    Gastrointestinal: Negative.    Endocrine: Negative.    Genitourinary: Negative.    Musculoskeletal: Negative.    Neurological: Negative.        Vitals:    07/21/22 1617   BP: 139/81   Pulse: 82   Temp: 98.4 °F (36.9 °C)       Body mass index is 23.02 kg/m².    Objective     Physical Exam  Vitals reviewed.   Constitutional:       Appearance: He is normal weight.   HENT:      Head: Normocephalic.      Right Ear: Hearing, tympanic membrane, ear canal and external ear normal.      Left Ear: Decreased hearing noted. There is impacted cerumen.      Nose: Nose normal.      Mouth/Throat:      Lips: Pink.      Mouth: Mucous membranes are moist.      Pharynx: Uvula midline.   Musculoskeletal:      Cervical back: Full passive range of motion without pain and normal range of motion.   Lymphadenopathy:      Cervical: No cervical adenopathy.   Neurological:      Mental Status: He is alert.         Assessment & Plan   Diagnoses and all orders for this visit:    1. Dysphagia, unspecified type (Primary)  -     FL Video Swallow With Speech Single Contrast; Future    2. Tobacco use    3. Left ear pain  -     Comprehensive Hearing Test; Future    4. Otorrhea of left ear  -     Comprehensive Hearing Test; Future    5. Laryngopharyngeal reflux    6. History of HIV infection (HCC)    7. History of hepatitis C    Other orders  -     ciprofloxacin-dexamethasone (CIPRODEX) 0.3-0.1 % otic suspension; Administer 3 drops into the left ear 4 (Four) Times a Day for 7 days.  Dispense: 7.5 mL; Refill: 0    drops to left ear as directed   Go back on  reflux medications   Swallow study   Audio at follow up   Tobacco cessation discussed   If no improvement will obtain direct visualization and possible ct     * Surgery not found *  Orders Placed This Encounter   Procedures   • FL Video Swallow With Speech Single Contrast     Standing Status:   Future     Standing Expiration Date:   7/21/2023     Order Specific Question:   Reason for Exam:     Answer:   dysphagia     Order Specific Question:   Release to patient     Answer:   Immediate   • Comprehensive Hearing Test     Standing Status:   Future     Standing Expiration Date:   7/21/2023     Order Specific Question:   Laterality     Answer:   Bilateral     Order Specific Question:   Release to patient     Answer:   Immediate     Return in about 4 weeks (around 8/18/2022) for Recheck.       Patient Instructions   I advised the patient of the risks in continuing to use tobacco and recommended complete cessation, The inherent risks including the risk of disability, development of a malignancy and/or death was discussed.  The patient indicated understanding. Gastroesophageal Reflux Disease (Laryngopharyngeal Reflux), Adult  Gastroesophageal reflux disease (GERD) and/or Laryngopharyngeal Reflux, (LPR) happens when acid from your stomach flows up into the esophagus and/or throat and voicebox or larynx. When acid comes in contact with the these organs, the acid can cause soreness (inflammation). Over time, GERD may create small holes (ulcers) in the lining of the esophagus and may lead to the development of hoarseness, difficulty swallowing,   feeling of something stuck in the throat, increased mucous or drainage and even predispose to the development of malignancies, (cancer).    CAUSES   · Increased body weight. This puts pressure on the stomach, making acid rise from the stomach into the esophagus.  · Smoking. This increases acid production in the stomach.  · Drinking alcohol. This causes decreased pressure in the lower  esophageal sphincter (valve or ring of muscle between the esophagus and stomach), allowing acid from the stomach into the esophagus.  · Late evening meals and a full stomach. This increases pressure and acid production in the stomach.  · A malformed lower esophageal sphincter  · Diet which can include avoidance of gluten and dairy products  · Age  SYMPTOMS   · Burning pain in the lower part of the mid-chest behind the breastbone and in the mid-stomach area. This may occur twice a week or more often.  · Trouble swallowing.  · Sore throat.  · Dry cough.  · Asthma-like symptoms including chest tightness, shortness of breath, or wheezing.  · Globus sensation-something stuck in the throat/fullness  · Hoarseness  DIAGNOSIS   Your caregiver may be able to diagnose GERD based on your symptoms. In some cases, X-rays and other tests may be done to check for complications or to check the condition of your stomach and esophagus.  You may need to see another doctor.  TREATMENT   Over-the-counter or prescription medicines to help decrease acid production.   Dietary and behavioral modifications or changes may be also recommended.  HOME CARE INSTRUCTIONS   · Change the factors that you can control. Ask your caregiver for guidance concerning weight loss, quitting smoking, and alcohol consumption.  · Avoid foods and drinks that make your symptoms worse, and MAY include such as:  ¨ Caffeine or alcoholic drinks.  ¨ Chocolate.  ¨ Gluten containing foods  ¨ Dairy  ¨ Peppermint or mint flavorings.  ¨ Garlic and onions.  ¨ Spicy foods.  ¨ Citrus fruits, such as oranges, aristeo, or limes.  ¨ Tomato-based foods such as sauce, chili, salsa, and pizza.  ¨ Fried and fatty foods.  · Avoid lying down for the 3 hours prior to your bedtime or prior to taking a nap.  · Eat small, frequent meals instead of large meals.  · Wear loose-fitting clothing. Do not wear anything tight around your waist that causes pressure on your stomach.  · Raise the head  of your bed 6 to 8 inches with wood blocks to help you sleep. Extra pillows will not help.  · Only take over-the-counter or prescription medicines for pain, discomfort, or fever as directed by your caregiver.  · Do not take aspirin, ibuprofen, or other nonsteroidal anti-inflammatory drugs if possible (NSAIDs).  SEEK IMMEDIATE MEDICAL CARE IF:   · You have pain in your arms, neck, jaw, teeth, or back.  · Your pain increases or changes in intensity or duration.  · You develop nausea, vomiting, or sweating (diaphoresis).  · You develop shortness of breath, or you faint.  · Your vomit is green, yellow, black, or looks like coffee grounds or blood.  · Your stool is red, bloody, or black.  These symptoms could be signs of other problems, such as heart disease, gastric bleeding, or esophageal bleeding.  MAKE SURE YOU:   · Understand these instructions.  · Will watch your condition.  · Will get help right away if you are not doing well or get worse.     This information is not intended to replace advice given to you by your physician. Make sure you discuss any questions you have with your health care provider.     Modified by Duke Camacho MD, FACS 9/8/2016.  Document Released: 09/27/2006 Document Revised: 01/08/2016 Document Reviewed: 04/13/2016  [employer] Interactive Patient Education ©2016 [employer]

## 2022-07-21 ENCOUNTER — OFFICE VISIT (OUTPATIENT)
Dept: OTOLARYNGOLOGY | Facility: CLINIC | Age: 52
End: 2022-07-21

## 2022-07-21 VITALS
HEART RATE: 82 BPM | HEIGHT: 67 IN | DIASTOLIC BLOOD PRESSURE: 81 MMHG | WEIGHT: 147 LBS | TEMPERATURE: 98.4 F | BODY MASS INDEX: 23.07 KG/M2 | SYSTOLIC BLOOD PRESSURE: 139 MMHG

## 2022-07-21 DIAGNOSIS — K21.9 LARYNGOPHARYNGEAL REFLUX: ICD-10-CM

## 2022-07-21 DIAGNOSIS — Z72.0 TOBACCO USE: ICD-10-CM

## 2022-07-21 DIAGNOSIS — Z86.19 HISTORY OF HEPATITIS C: ICD-10-CM

## 2022-07-21 DIAGNOSIS — R13.10 DYSPHAGIA, UNSPECIFIED TYPE: Primary | ICD-10-CM

## 2022-07-21 DIAGNOSIS — H92.12 OTORRHEA OF LEFT EAR: ICD-10-CM

## 2022-07-21 DIAGNOSIS — H92.02 LEFT EAR PAIN: ICD-10-CM

## 2022-07-21 DIAGNOSIS — B20 HISTORY OF HIV INFECTION: ICD-10-CM

## 2022-07-21 PROCEDURE — 99214 OFFICE O/P EST MOD 30 MIN: CPT | Performed by: NURSE PRACTITIONER

## 2022-07-21 RX ORDER — CIPROFLOXACIN AND DEXAMETHASONE 3; 1 MG/ML; MG/ML
3 SUSPENSION/ DROPS AURICULAR (OTIC) 4 TIMES DAILY
Qty: 7.5 ML | Refills: 0 | Status: SHIPPED | OUTPATIENT
Start: 2022-07-21 | End: 2022-07-28

## 2022-07-21 RX ORDER — SERTRALINE HYDROCHLORIDE 25 MG/1
25 TABLET, FILM COATED ORAL DAILY
COMMUNITY
Start: 2022-06-27 | End: 2022-09-16

## 2022-07-21 RX ORDER — MISOPROSTOL 200 UG/1
200 TABLET ORAL 4 TIMES DAILY
COMMUNITY
Start: 2022-06-27

## 2022-07-21 NOTE — PATIENT INSTRUCTIONS
I advised the patient of the risks in continuing to use tobacco and recommended complete cessation, The inherent risks including the risk of disability, development of a malignancy and/or death was discussed.  The patient indicated understanding. Gastroesophageal Reflux Disease (Laryngopharyngeal Reflux), Adult  Gastroesophageal reflux disease (GERD) and/or Laryngopharyngeal Reflux, (LPR) happens when acid from your stomach flows up into the esophagus and/or throat and voicebox or larynx. When acid comes in contact with the these organs, the acid can cause soreness (inflammation). Over time, GERD may create small holes (ulcers) in the lining of the esophagus and may lead to the development of hoarseness, difficulty swallowing,   feeling of something stuck in the throat, increased mucous or drainage and even predispose to the development of malignancies, (cancer).    CAUSES   Increased body weight. This puts pressure on the stomach, making acid rise from the stomach into the esophagus.  Smoking. This increases acid production in the stomach.  Drinking alcohol. This causes decreased pressure in the lower esophageal sphincter (valve or ring of muscle between the esophagus and stomach), allowing acid from the stomach into the esophagus.  Late evening meals and a full stomach. This increases pressure and acid production in the stomach.  A malformed lower esophageal sphincter  Diet which can include avoidance of gluten and dairy products  Age  SYMPTOMS   Burning pain in the lower part of the mid-chest behind the breastbone and in the mid-stomach area. This may occur twice a week or more often.  Trouble swallowing.  Sore throat.  Dry cough.  Asthma-like symptoms including chest tightness, shortness of breath, or wheezing.  Globus sensation-something stuck in the throat/fullness  Hoarseness  DIAGNOSIS   Your caregiver may be able to diagnose GERD based on your symptoms. In some cases, X-rays and other tests may be done to  check for complications or to check the condition of your stomach and esophagus.  You may need to see another doctor.  TREATMENT   Over-the-counter or prescription medicines to help decrease acid production.   Dietary and behavioral modifications or changes may be also recommended.  HOME CARE INSTRUCTIONS   Change the factors that you can control. Ask your caregiver for guidance concerning weight loss, quitting smoking, and alcohol consumption.  Avoid foods and drinks that make your symptoms worse, and MAY include such as:  Caffeine or alcoholic drinks.  Chocolate.  Gluten containing foods  Dairy  Peppermint or mint flavorings.  Garlic and onions.  Spicy foods.  Citrus fruits, such as oranges, aristeo, or limes.  Tomato-based foods such as sauce, chili, salsa, and pizza.  Fried and fatty foods.  Avoid lying down for the 3 hours prior to your bedtime or prior to taking a nap.  Eat small, frequent meals instead of large meals.  Wear loose-fitting clothing. Do not wear anything tight around your waist that causes pressure on your stomach.  Raise the head of your bed 6 to 8 inches with wood blocks to help you sleep. Extra pillows will not help.  Only take over-the-counter or prescription medicines for pain, discomfort, or fever as directed by your caregiver.  Do not take aspirin, ibuprofen, or other nonsteroidal anti-inflammatory drugs if possible (NSAIDs).  SEEK IMMEDIATE MEDICAL CARE IF:   You have pain in your arms, neck, jaw, teeth, or back.  Your pain increases or changes in intensity or duration.  You develop nausea, vomiting, or sweating (diaphoresis).  You develop shortness of breath, or you faint.  Your vomit is green, yellow, black, or looks like coffee grounds or blood.  Your stool is red, bloody, or black.  These symptoms could be signs of other problems, such as heart disease, gastric bleeding, or esophageal bleeding.  MAKE SURE YOU:   Understand these instructions.  Will watch your condition.  Will get help  right away if you are not doing well or get worse.     This information is not intended to replace advice given to you by your physician. Make sure you discuss any questions you have with your health care provider.     Modified by Duke Camacho MD, FACS 9/8/2016.  Document Released: 09/27/2006 Document Revised: 01/08/2016 Document Reviewed: 04/13/2016  [employer] Patient Education ©2016 [employer]

## 2022-07-28 ENCOUNTER — LAB (OUTPATIENT)
Dept: LAB | Facility: HOSPITAL | Age: 52
End: 2022-07-28

## 2022-07-28 DIAGNOSIS — Z20.822 ENCOUNTER FOR PREOPERATIVE SCREENING LABORATORY TESTING FOR COVID-19 VIRUS: ICD-10-CM

## 2022-07-28 DIAGNOSIS — Z01.812 ENCOUNTER FOR PREOPERATIVE SCREENING LABORATORY TESTING FOR COVID-19 VIRUS: ICD-10-CM

## 2022-07-28 LAB — SARS-COV-2 ORF1AB RESP QL NAA+PROBE: NOT DETECTED

## 2022-07-28 PROCEDURE — C9803 HOPD COVID-19 SPEC COLLECT: HCPCS

## 2022-07-28 PROCEDURE — U0004 COV-19 TEST NON-CDC HGH THRU: HCPCS

## 2022-07-29 ENCOUNTER — HOSPITAL ENCOUNTER (OUTPATIENT)
Dept: PULMONOLOGY | Facility: HOSPITAL | Age: 52
Discharge: HOME | End: 2022-07-29
Admitting: PHYSICIAN ASSISTANT

## 2022-07-29 ENCOUNTER — TRANSCRIBE ORDERS (OUTPATIENT)
Dept: ADMINISTRATIVE | Facility: HOSPITAL | Age: 52
End: 2022-07-29

## 2022-07-29 DIAGNOSIS — J45.909 ACUTE ASTHMA: ICD-10-CM

## 2022-07-29 DIAGNOSIS — J45.909 ASTHMA, ACUTE: ICD-10-CM

## 2022-07-29 DIAGNOSIS — J45.909 ACUTE ASTHMA: Primary | ICD-10-CM

## 2022-07-29 PROCEDURE — 94729 DIFFUSING CAPACITY: CPT | Performed by: INTERNAL MEDICINE

## 2022-07-29 PROCEDURE — 94729 DIFFUSING CAPACITY: CPT

## 2022-07-29 PROCEDURE — 94010 BREATHING CAPACITY TEST: CPT

## 2022-07-29 PROCEDURE — 94726 PLETHYSMOGRAPHY LUNG VOLUMES: CPT | Performed by: INTERNAL MEDICINE

## 2022-07-29 PROCEDURE — 94010 BREATHING CAPACITY TEST: CPT | Performed by: INTERNAL MEDICINE

## 2022-07-29 PROCEDURE — 94726 PLETHYSMOGRAPHY LUNG VOLUMES: CPT

## 2022-08-01 ENCOUNTER — APPOINTMENT (OUTPATIENT)
Dept: GENERAL RADIOLOGY | Facility: HOSPITAL | Age: 52
End: 2022-08-01

## 2022-08-01 DIAGNOSIS — R13.10 DYSPHAGIA, UNSPECIFIED TYPE: Primary | ICD-10-CM

## 2022-08-02 ENCOUNTER — LAB (OUTPATIENT)
Dept: LAB | Facility: HOSPITAL | Age: 52
End: 2022-08-02

## 2022-08-02 ENCOUNTER — OFFICE VISIT (OUTPATIENT)
Dept: CARDIOLOGY | Facility: CLINIC | Age: 52
End: 2022-08-02

## 2022-08-02 VITALS
WEIGHT: 148 LBS | BODY MASS INDEX: 23.23 KG/M2 | SYSTOLIC BLOOD PRESSURE: 100 MMHG | DIASTOLIC BLOOD PRESSURE: 54 MMHG | OXYGEN SATURATION: 97 % | HEIGHT: 67 IN | HEART RATE: 85 BPM

## 2022-08-02 DIAGNOSIS — R13.10 DYSPHAGIA, UNSPECIFIED TYPE: ICD-10-CM

## 2022-08-02 DIAGNOSIS — B20 CURRENTLY ASYMPTOMATIC HIV INFECTION, WITH HISTORY OF HIV-RELATED ILLNESS: ICD-10-CM

## 2022-08-02 DIAGNOSIS — F19.10 IV DRUG ABUSE: ICD-10-CM

## 2022-08-02 DIAGNOSIS — R07.9 CHEST PAIN IN ADULT: Primary | ICD-10-CM

## 2022-08-02 LAB — SARS-COV-2 ORF1AB RESP QL NAA+PROBE: NOT DETECTED

## 2022-08-02 PROCEDURE — U0004 COV-19 TEST NON-CDC HGH THRU: HCPCS

## 2022-08-02 PROCEDURE — 99213 OFFICE O/P EST LOW 20 MIN: CPT | Performed by: PHYSICIAN ASSISTANT

## 2022-08-02 PROCEDURE — C9803 HOPD COVID-19 SPEC COLLECT: HCPCS

## 2022-08-02 NOTE — PROGRESS NOTES
"[facility] -  CLINIC FOLLOW UP     Patient Care Team:  Alice Anderson MD as PCP - General  Alice Anderson MD as PCP - Family Medicine  Carlos Eduardo Montiel MD as Cardiologist (Cardiology)  Zain Leo PA as Referring Physician (Physician Assistant)    Chief Complaint:follow up to stress testin     Subjective     HPI: Today I had the pleasure of seeing Alex Ghotra in the cardiology clinic for follow up. He is a 52 year old male with a history of tobacco, polysubstance abuse with meth/cocaine, HIV, and chest pain syndrome who follows up today after cardiac testing. He had presented to the ER in March for chest pain and ruled out for MI, established care with Dr. Montiel in May. He described a pattern of chest pain both with exertion as well as at rest, moderate substernal, and pressure like.     He underwnet ischemic work up with stress testing and echo, which revealed a surprisingly normal LVEF and no findings to suggest high risk ischemia. He states he has used substances since he was the age of 9. His BP is acceptable and requires no antihypertensives. He denies any recent ER visits or hospitalizations since March. He denies any presyncope or syncopal episodes. He still maintaining his sobriety on his own and plans to establish care with psychiatry next week.       Objective     Visit Vitals  /54   Pulse 85   Ht 170.2 cm (67\")   Wt 67.1 kg (148 lb)   SpO2 97%   BMI 23.18 kg/m²           Vitals reviewed.   Constitutional:       Appearance: Not in distress.   Eyes:      Extraocular Movements: Extraocular movements intact.      Conjunctiva/sclera: Conjunctivae normal.      Pupils: Pupils are equal, round, and reactive to light.   HENT:      Head: Normocephalic and atraumatic.      Nose: Nose normal.    Mouth/Throat:      Lips: Pink.      Mouth: Mucous membranes are moist.      Pharynx: Oropharynx is clear.   Neck:      Vascular: No carotid bruit or JVD. JVD normal. "   Pulmonary:      Effort: Pulmonary effort is normal.      Breath sounds: Normal breath sounds.   Chest:      Chest wall: Not tender to palpatation.   Cardiovascular:      PMI at left midclavicular line. Normal rate. Regular rhythm. Normal S1. Normal S2.      Murmurs: There is no murmur.      No gallop. No rub.   Pulses:     Radial: 2+ bilaterally.     Posterior tibial: 2+ bilaterally.  Edema:     Peripheral edema absent.   Abdominal:      General: Bowel sounds are normal.      Palpations: Abdomen is soft.   Musculoskeletal: Normal range of motion.      Extremities: No clubbing present.     Cervical back: Normal range of motion. Skin:     General: Skin is warm and dry.   Neurological:      General: No focal deficit present.      Mental Status: Alert and oriented to person, place, and time.      Cranial Nerves: Cranial nerves are intact.   Psychiatric:         Attention and Perception: Attention normal.         Mood and Affect: Affect normal. Mood is anxious.         Speech: Speech normal.         Behavior: Behavior normal.         Cognition and Memory: Cognition normal.             The following portions of the patient's history were reviewed and updated as appropriate: allergies, current medications, past medical history, past social history, past and problem list.     Review of Systems   Constitutional: Negative.    HENT: Negative.    Eyes: Negative.    Respiratory: Positive for shortness of breath.    Cardiovascular: Negative.    Gastrointestinal: Negative.    Endocrine: Negative.    Genitourinary: Negative.    Musculoskeletal: Negative.    Skin: Negative.    Allergic/Immunologic: Negative.    Neurological: Negative.    Hematological: Negative.    Psychiatric/Behavioral: Negative.           Echo EF Estimated  Lab Results   Component Value Date    ECHOEFEST 55 05/23/2022       Procedures      Medication Review: yes    Current Outpatient Medications:   •  albuterol (PROVENTIL HFA;VENTOLIN HFA) 108 (90 Base) MCG/ACT  inhaler, Inhale 2 puffs Every 4 (Four) Hours As Needed for Wheezing., Disp: , Rfl:   •  Cholecalciferol (Vitamin D3) 50 MCG (2000 UT) capsule, TAKE 1 CAPSULE BY MOUTH DAILY WITH A MEAL, Disp: , Rfl:   •  darunavir ethanolate (PREZISTA) 800 MG tablet tablet, Take 800 mg by mouth., Disp: , Rfl:   •  Darunavir-Cobicistat (Prezcobix) 800-150 MG per tablet, Take  by mouth Daily., Disp: , Rfl:   •  dolutegravir (Tivicay) 50 MG tablet, Take 50 mg by mouth Daily., Disp: , Rfl:   •  Emtricitabine-Tenofovir AF (Descovy) 200-25 MG per tablet, Take  by mouth Daily., Disp: , Rfl:   •  etravirine (INTELENCE) 100 MG tablet, Take  by mouth., Disp: , Rfl:   •  ibuprofen (ADVIL,MOTRIN) 800 MG tablet, Take 800 mg by mouth Daily As Needed., Disp: , Rfl:   •  Ipratropium-Albuterol (COMBIVENT IN), Inhale 2 puffs., Disp: , Rfl:   •  miSOPROStol (CYTOTEC) 200 MCG tablet, Take 200 mcg by mouth 4 (Four) Times a Day., Disp: , Rfl:   •  multivitamin with minerals tablet tablet, Take 1 tablet by mouth Daily., Disp: , Rfl:   •  Nutritional Supplements (GUNNAR PO), Take  by mouth Daily., Disp: , Rfl:   •  rilpivirine (EDURANT) 25 MG tablet tablet, Take 25 mg by mouth Daily., Disp: , Rfl:   •  sertraline (ZOLOFT) 25 MG tablet, Take 25 mg by mouth Daily., Disp: , Rfl:   •  tadalafil (Cialis) 20 MG tablet, 1/2 to 1 tab by mouth 1 hour prior to intercourse, Disp: 30 tablet, Rfl: 11  •  vitamin C (ASCORBIC ACID) 250 MG tablet, Take 250 mg by mouth Daily., Disp: , Rfl:    Allergies   Allergen Reactions   • Amoxicillin Other (See Comments)     unknown   • Bactrim [Sulfamethoxazole-Trimethoprim]    • Other      SOME HIV MEDICATION, DONT KNOW NAME    • Remeron [Mirtazapine] Other (See Comments)     unknown   • Ziagen [Abacavir] Other (See Comments)     unknown       I have reviewed       CBC:  Lab Results - Last 18 Months   Lab Units 03/25/22  2327   WBC 10*3/mm3 8.22   HEMOGLOBIN g/dL 15.1   HEMATOCRIT % 45.0   PLATELETS 10*3/mm3 214      BMP/CMP:  Lab  Results - Last 18 Months   Lab Units 03/25/22  2327   SODIUM mmol/L 138   POTASSIUM mmol/L 4.0   CHLORIDE mmol/L 105   CO2 mmol/L 22.0   GLUCOSE mg/dL 105*   BUN mg/dL 12   CREATININE mg/dL 1.21   CALCIUM mg/dL 9.1     BNP:   Lab Results - Last 18 Months   Lab Units 03/25/22  2327   PROBNP pg/mL 19.6      THYROID:No results for input(s): TSH, FREET4, FTI in the last 67977 hours.    Invalid input(s): FREET3, T3, T4, TEUP,  TOTALT4    Results for orders placed during the hospital encounter of 06/27/22    Adult Transthoracic Echo Complete w/ Color, Spectral and Contrast if necessary per protocol    Interpretation Summary  · Left ventricular ejection fraction appears to be 56 - 60%. Left ventricular systolic function is normal.  · Abnormal global longitudinal LV strain (GLS) = -11.0%.  · Left ventricular diastolic function was normal.  · Estimated right ventricular systolic pressure from tricuspid regurgitation is normal (<35 mmHg).     Assessment:   Diagnoses and all orders for this visit:    1. Chest pain in adult (Primary)    2. IV drug abuse (HCC)    3. Currently asymptomatic HIV infection, with history of HIV-related illness (HCC)    Chest pain syndrome: reviewed stress testing and echo with patient, provided reassurance. Discussed that these are very promising results. Would favor follow up in 6 months and then consider PRN vs every other year unless symptoms arise given his HIV status.     Polysubstance abuse: He states he's been sober for >100 days now and was congratulated. Will establish care with psychiatry next week. Advised him given his history of meth/cocaine use that he is quite fortunate to have normal LVEF and no findings of ischemia on cardiac testing.     HIV: Follows with HIV clinic locally. Compliant with meds, recent CD4 count stable with undetectable levels.     I spent 30 minutes caring for Alex on this date of service. This time includes time spent by me in the following activities:reviewing  tests, obtaining and/or reviewing a separately obtained history, performing a medically appropriate examination and/or evaluation  and documenting information in the medical record        Electronically signed by ROSA Ruvalcaba

## 2022-08-03 ENCOUNTER — TELEPHONE (OUTPATIENT)
Dept: OTOLARYNGOLOGY | Facility: CLINIC | Age: 52
End: 2022-08-03

## 2022-08-03 ENCOUNTER — HOSPITAL ENCOUNTER (OUTPATIENT)
Dept: GENERAL RADIOLOGY | Facility: HOSPITAL | Age: 52
Discharge: HOME | End: 2022-08-03
Admitting: NURSE PRACTITIONER

## 2022-08-03 DIAGNOSIS — R13.10 DYSPHAGIA, UNSPECIFIED TYPE: ICD-10-CM

## 2022-08-03 PROCEDURE — 92611 MOTION FLUOROSCOPY/SWALLOW: CPT

## 2022-08-03 PROCEDURE — 74230 X-RAY XM SWLNG FUNCJ C+: CPT

## 2022-08-03 RX ADMIN — BARIUM SULFATE 25 ML: 400 SUSPENSION ORAL at 08:59

## 2022-08-03 RX ADMIN — BARIUM SULFATE 25 ML: 400 SUSPENSION ORAL at 09:00

## 2022-08-03 RX ADMIN — BARIUM SULFATE 10 ML: 400 PASTE ORAL at 08:59

## 2022-08-03 RX ADMIN — BARIUM SULFATE 30 ML: 0.81 POWDER, FOR SUSPENSION ORAL at 09:00

## 2022-08-03 NOTE — THERAPY DISCHARGE NOTE
Outpatient Speech Language Pathology   Adult Swallow Initial Eval/Discharge  [facility]     Patient Name: Alex Ghotra  : 1970  MRN: [id number]  Today's Date: 8/3/2022    SPEECH-LANGUAGE PATHOLOGY EVALUTION - VFSS  Subjective: The patient was seen on this date for a VFSS(Videofluoroscopic Swallowing Study).  Patient was alert and cooperative.      Objective: Risks/benefits were reviewed with the patient, and consent was obtained. The study was completed with SLP and Radiologist present. The patient was seen in lateral view(s). Textures given included thin liquid, nectar thick liquid, honey thick liquid, puree consistency, mechanical soft consistency and regular consistency.  Assessment: Pt noted to have premature spillage to the pyriform sinuses of a mild amount with nectar thick and thin liquids secondary to delay in swallow initiation. His bolus formation and swallowing timing appeared to be WNL with all other consistencies. He was noted to have mild-moderate decrease in superior laryngeal movement and anterior hyolaryngeal excursion. Epiglottic inversion was also noted to be decreased by at least a mild amount with completion on nectar thick and thin liquids. Residue was observed within the vallecula and UES of a mild amount following nectar thick, thin, and honey thick trials. He did not exhibit any instances of penetration of aspiration into the airway.   SLP Findings: Patient presents with functional swallow.     Recommendations: Diet Textures: thin liquid, regular consistency food. Medications should be taken whole with thin liquids.   Recommended Strategies: Upright for PO and small bites and sips. Oral care before breakfast, after all meals and PRN.    Dysphagia therapy is not recommended. Rationale: Swallow WFL.    Tommy Rodriguez MS CCC-SLP 8/3/2022 15:05 CDT         Visit Date: 2022   Patient Active Problem List   Diagnosis   • Chest pain in adult   • Smoker   • Currently  asymptomatic HIV infection, with history of HIV-related illness (HCC)   • IV drug abuse (HCC)   • Family history of early CAD   • Chronic midline low back pain without sciatica   • Pharyngoesophageal dysphagia   • Hx of colonic polyp        Past Medical History:   Diagnosis Date   • AIDS (HCC)    • Asthma    • Hep C w/ coma, chronic         Past Surgical History:   Procedure Laterality Date   • APPENDECTOMY     • COLONOSCOPY      2013? ([facility]) polyps   • COLONOSCOPY N/A 06/09/2022    Procedure: COLONOSCOPY WITH ANESTHESIA;  Surgeon: Michael Israel MD;  Location:  [facility];  Service: Gastroenterology;  Laterality: N/A;  pre hx colon polyp  post; normal  Alice Anderson MD   • ENDOSCOPY     • ENDOSCOPY N/A 06/09/2022    Procedure: ESOPHAGOGASTRODUODENOSCOPY WITH ANESTHESIA;  Surgeon: Michael Israel MD;  Location: [facility];  Service: Gastroenterology;  Laterality: N/A;  pre dysphagia  post candidiasis; stricture dilated  Alice Anderson MD   • MANDIBLE FRACTURE SURGERY           Visit Dx:     ICD-10-CM ICD-9-CM   1. Dysphagia, unspecified type  R13.10 787.20                SLP Adult Swallow Evaluation     Row Name 08/03/22 0828       Rehab Evaluation    Document Type evaluation  -CS    Subjective Information no complaints  -CS    Patient Observations alert;cooperative;agree to therapy  -CS    Patient Effort good  -CS       General Information    Patient Profile Reviewed yes  -CS    Pertinent History Of Current Problem GERD, globus sensation, HIV, reports TBI  -CS    Current Method of Nutrition regular textures;thin liquids  -CS    Precautions/Limitations, Vision WFL  -CS    Precautions/Limitations, Hearing WFL  -CS    Prior Level of Function-Communication WFL  -CS    Prior Level of Function-Swallowing no diet consistency restrictions  -CS    Plans/Goals Discussed with patient  -CS    Barriers to Rehab none identified  -CS    Patient's Goals for Discharge eat/drink without  coughing/choking  -CS       Oral Motor Structure and Function    Dentition Assessment edentulous, does not have dentures  -CS    Secretion Management WNL/WFL  -CS    Mucosal Quality moist, healthy  -CS    Volitional Swallow WFL  -CS    Volitional Cough WFL  -CS       Oral Musculature and Cranial Nerve Assessment    Oral Motor General Assessment WFL  -CS       General Eating/Swallowing Observations    Respiratory Support Currently in Use room air  -CS       Respiratory    Respiratory Status room air  -CS       MBS/VFSS    Utensils Used spoon;straw  -CS       MBS/VFSS Interpretation    Oral Prep Phase WFL  -CS    Oral Transit Phase WFL  -CS    Oral Residue WFL  -CS       Initiation of Pharyngeal Swallow    Initiation of Pharyngeal Swallow bolus in pyriform sinuses  -CS    Pharyngeal Phase functional pharyngeal phase of swallowing  -CS       SLP Evaluation Clinical Impression    SLP Swallowing Diagnosis swallow WFL  -CS    Functional Impact no impact on function  -CS    Swallow Criteria for Skilled Therapeutic Interventions Met no problems identified which require skilled intervention  -CS       SLP Treatment Clinical Impressions    Plan for Continued Treatment (SLP) continue treatment per plan of care  -CS    Care Plan Review evaluation/treatment results reviewed  -CS       Recommendations    Therapy Frequency (Swallow) evaluation only  -CS    SLP Diet Recommendation regular textures;thin liquids  -CS    Recommended Precautions and Strategies upright posture during/after eating;small bites of food and sips of liquid  -CS    Oral Care Recommendations Oral Care BID/PRN  -CS    SLP Rec. for Method of Medication Administration meds whole;with thin liquids  -CS    Monitor for Signs of Aspiration yes;notify SLP if any concerns  -CS    Anticipated Discharge Disposition (SLP) home  -CS          User Key  (r) = Recorded By, (t) = Taken By, (c) = Cosigned By    Initials Name Provider Type    CS Tommy Rodriguez MS CCC-SLP Speech  and Language Pathologist                               OP SLP Education     Row Name 08/03/22 1445       Education    Barriers to Learning No barriers identified  -CS    Education Provided Described results of evaluation  -CS    Assessed Learning needs;Learning motivation;Learning preferences  -CS    Learning Motivation Strong  -CS    Learning Method Explanation  -CS    Teaching Response Verbalized understanding  -CS          User Key  (r) = Recorded By, (t) = Taken By, (c) = Cosigned By    Initials Name Effective Dates    CS Tommy Rodriguez, MS CCC-SLP 06/16/21 -                            Time Calculation:   SLP Start Time: 0828  SLP Stop Time: 0952  SLP Time Calculation (min): 84 min  Untimed Charges  SLP Eval/Re-eval : ST Motion Fluoro Eval Swallow - 80275  [id number] Motion Fluoro Eval Swallow Minutes: 84  Total Minutes  Untimed Charges Total Minutes: 84   Total Minutes: 84    Therapy Charges for Today     Code Description Service Date Service Provider Modifiers Qty    [id number] HC ST MOTION FLUORO EVAL SWALLOW 6 8/3/2022 Tommy Rodriguez, MS CCC-SLP GN 1                      Tommy Rodriguez MS CCC-SLP  8/3/2022

## 2022-08-03 NOTE — TELEPHONE ENCOUNTER
Patient notified     ----- Message from ARACELI Arana sent at 8/3/2022 11:14 AM CDT -----  Normal swallow study

## 2022-08-17 ENCOUNTER — TRANSCRIBE ORDERS (OUTPATIENT)
Dept: ADMINISTRATIVE | Facility: HOSPITAL | Age: 52
End: 2022-08-17

## 2022-08-17 ENCOUNTER — HOSPITAL ENCOUNTER (OUTPATIENT)
Dept: GENERAL RADIOLOGY | Facility: HOSPITAL | Age: 52
Discharge: HOME | End: 2022-08-17
Admitting: PHYSICIAN ASSISTANT

## 2022-08-17 DIAGNOSIS — R05.9 COUGH: ICD-10-CM

## 2022-08-17 DIAGNOSIS — M54.6 PAIN IN THORACIC SPINE: ICD-10-CM

## 2022-08-17 DIAGNOSIS — M54.50 LOW BACK PAIN, UNSPECIFIED BACK PAIN LATERALITY, UNSPECIFIED CHRONICITY, UNSPECIFIED WHETHER SCIATICA PRESENT: ICD-10-CM

## 2022-08-17 DIAGNOSIS — R05.9 COUGH: Primary | ICD-10-CM

## 2022-08-17 PROCEDURE — 72072 X-RAY EXAM THORAC SPINE 3VWS: CPT

## 2022-08-17 PROCEDURE — 71046 X-RAY EXAM CHEST 2 VIEWS: CPT

## 2022-08-17 PROCEDURE — 72110 X-RAY EXAM L-2 SPINE 4/>VWS: CPT

## 2022-08-18 ENCOUNTER — TRANSCRIBE ORDERS (OUTPATIENT)
Dept: ADMINISTRATIVE | Facility: HOSPITAL | Age: 52
End: 2022-08-18

## 2022-08-18 DIAGNOSIS — Z87.891 PERSONAL HISTORY OF TOBACCO USE, PRESENTING HAZARDS TO HEALTH: Primary | ICD-10-CM

## 2022-08-31 ENCOUNTER — HOSPITAL ENCOUNTER (OUTPATIENT)
Dept: CT IMAGING | Facility: HOSPITAL | Age: 52
Discharge: HOME | End: 2022-08-31
Admitting: PHYSICIAN ASSISTANT

## 2022-08-31 DIAGNOSIS — Z87.891 PERSONAL HISTORY OF TOBACCO USE, PRESENTING HAZARDS TO HEALTH: ICD-10-CM

## 2022-08-31 PROCEDURE — 71271 CT THORAX LUNG CANCER SCR C-: CPT

## 2022-09-09 ENCOUNTER — HOSPITAL ENCOUNTER (OUTPATIENT)
Dept: SLEEP CENTER | Age: 52
Discharge: HOME | End: 2022-09-11
Payer: MEDICAID

## 2022-09-09 PROCEDURE — G0399 HOME SLEEP TEST/TYPE 3 PORTA: HCPCS

## 2022-09-19 ENCOUNTER — HOSPITAL ENCOUNTER (EMERGENCY)
Facility: HOSPITAL | Age: 52
Discharge: HOME | End: 2022-09-19
Attending: STUDENT IN AN ORGANIZED HEALTH CARE EDUCATION/TRAINING PROGRAM | Admitting: STUDENT IN AN ORGANIZED HEALTH CARE EDUCATION/TRAINING PROGRAM

## 2022-09-19 ENCOUNTER — APPOINTMENT (OUTPATIENT)
Dept: GENERAL RADIOLOGY | Facility: HOSPITAL | Age: 52
End: 2022-09-19

## 2022-09-19 VITALS
TEMPERATURE: 97.6 F | RESPIRATION RATE: 22 BRPM | DIASTOLIC BLOOD PRESSURE: 78 MMHG | WEIGHT: 150 LBS | BODY MASS INDEX: 23.54 KG/M2 | OXYGEN SATURATION: 95 % | HEART RATE: 78 BPM | SYSTOLIC BLOOD PRESSURE: 133 MMHG | HEIGHT: 67 IN

## 2022-09-19 DIAGNOSIS — R06.7 SNEEZING: ICD-10-CM

## 2022-09-19 DIAGNOSIS — M79.18 INTERCOSTAL MUSCLE PAIN: ICD-10-CM

## 2022-09-19 DIAGNOSIS — B20 HISTORY OF HIV INFECTION: ICD-10-CM

## 2022-09-19 DIAGNOSIS — J44.1 COPD WITH ACUTE EXACERBATION: Primary | ICD-10-CM

## 2022-09-19 DIAGNOSIS — R07.89 CHEST WALL PAIN: ICD-10-CM

## 2022-09-19 LAB
ANION GAP SERPL CALCULATED.3IONS-SCNC: 10 MMOL/L (ref 5–15)
B PARAPERT DNA SPEC QL NAA+PROBE: NOT DETECTED
B PERT DNA SPEC QL NAA+PROBE: NOT DETECTED
BASOPHILS # BLD AUTO: 0.03 10*3/MM3 (ref 0–0.2)
BASOPHILS NFR BLD AUTO: 0.2 % (ref 0–1.5)
BUN SERPL-MCNC: 22 MG/DL (ref 6–20)
BUN/CREAT SERPL: 25.3 (ref 7–25)
C PNEUM DNA NPH QL NAA+NON-PROBE: NOT DETECTED
CALCIUM SPEC-SCNC: 9.3 MG/DL (ref 8.6–10.5)
CHLORIDE SERPL-SCNC: 102 MMOL/L (ref 98–107)
CO2 SERPL-SCNC: 24 MMOL/L (ref 22–29)
CREAT SERPL-MCNC: 0.87 MG/DL (ref 0.76–1.27)
DEPRECATED RDW RBC AUTO: 44 FL (ref 37–54)
EGFRCR SERPLBLD CKD-EPI 2021: 103.2 ML/MIN/1.73
EOSINOPHIL # BLD AUTO: 0 10*3/MM3 (ref 0–0.4)
EOSINOPHIL NFR BLD AUTO: 0 % (ref 0.3–6.2)
ERYTHROCYTE [DISTWIDTH] IN BLOOD BY AUTOMATED COUNT: 12.7 % (ref 12.3–15.4)
FLUAV SUBTYP SPEC NAA+PROBE: NOT DETECTED
FLUBV RNA ISLT QL NAA+PROBE: NOT DETECTED
GLUCOSE SERPL-MCNC: 126 MG/DL (ref 65–99)
HADV DNA SPEC NAA+PROBE: NOT DETECTED
HCOV 229E RNA SPEC QL NAA+PROBE: NOT DETECTED
HCOV HKU1 RNA SPEC QL NAA+PROBE: NOT DETECTED
HCOV NL63 RNA SPEC QL NAA+PROBE: NOT DETECTED
HCOV OC43 RNA SPEC QL NAA+PROBE: NOT DETECTED
HCT VFR BLD AUTO: 49.5 % (ref 37.5–51)
HGB BLD-MCNC: 16.2 G/DL (ref 13–17.7)
HMPV RNA NPH QL NAA+NON-PROBE: NOT DETECTED
HPIV1 RNA ISLT QL NAA+PROBE: NOT DETECTED
HPIV2 RNA SPEC QL NAA+PROBE: NOT DETECTED
HPIV3 RNA NPH QL NAA+PROBE: NOT DETECTED
HPIV4 P GENE NPH QL NAA+PROBE: NOT DETECTED
IMM GRANULOCYTES # BLD AUTO: 0.08 10*3/MM3 (ref 0–0.05)
IMM GRANULOCYTES NFR BLD AUTO: 0.5 % (ref 0–0.5)
LYMPHOCYTES # BLD AUTO: 2.39 10*3/MM3 (ref 0.7–3.1)
LYMPHOCYTES NFR BLD AUTO: 16.1 % (ref 19.6–45.3)
M PNEUMO IGG SER IA-ACNC: NOT DETECTED
MCH RBC QN AUTO: 30.8 PG (ref 26.6–33)
MCHC RBC AUTO-ENTMCNC: 32.7 G/DL (ref 31.5–35.7)
MCV RBC AUTO: 94.1 FL (ref 79–97)
MONOCYTES # BLD AUTO: 0.55 10*3/MM3 (ref 0.1–0.9)
MONOCYTES NFR BLD AUTO: 3.7 % (ref 5–12)
NEUTROPHILS NFR BLD AUTO: 11.79 10*3/MM3 (ref 1.7–7)
NEUTROPHILS NFR BLD AUTO: 79.5 % (ref 42.7–76)
NRBC BLD AUTO-RTO: 0 /100 WBC (ref 0–0.2)
PLATELET # BLD AUTO: 258 10*3/MM3 (ref 140–450)
PMV BLD AUTO: 9.1 FL (ref 6–12)
POTASSIUM SERPL-SCNC: 4.4 MMOL/L (ref 3.5–5.2)
RBC # BLD AUTO: 5.26 10*6/MM3 (ref 4.14–5.8)
RHINOVIRUS RNA SPEC NAA+PROBE: NOT DETECTED
RSV RNA NPH QL NAA+NON-PROBE: NOT DETECTED
SARS-COV-2 RNA NPH QL NAA+NON-PROBE: NOT DETECTED
SODIUM SERPL-SCNC: 136 MMOL/L (ref 136–145)
TROPONIN T SERPL-MCNC: <0.01 NG/ML (ref 0–0.03)
WBC NRBC COR # BLD: 14.84 10*3/MM3 (ref 3.4–10.8)

## 2022-09-19 PROCEDURE — 71046 X-RAY EXAM CHEST 2 VIEWS: CPT

## 2022-09-19 PROCEDURE — 99284 EMERGENCY DEPT VISIT MOD MDM: CPT

## 2022-09-19 PROCEDURE — 0202U NFCT DS 22 TRGT SARS-COV-2: CPT | Performed by: STUDENT IN AN ORGANIZED HEALTH CARE EDUCATION/TRAINING PROGRAM

## 2022-09-19 PROCEDURE — 85025 COMPLETE CBC W/AUTO DIFF WBC: CPT | Performed by: STUDENT IN AN ORGANIZED HEALTH CARE EDUCATION/TRAINING PROGRAM

## 2022-09-19 PROCEDURE — 96372 THER/PROPH/DIAG INJ SC/IM: CPT

## 2022-09-19 PROCEDURE — 80048 BASIC METABOLIC PNL TOTAL CA: CPT | Performed by: STUDENT IN AN ORGANIZED HEALTH CARE EDUCATION/TRAINING PROGRAM

## 2022-09-19 PROCEDURE — 25010000002 KETOROLAC TROMETHAMINE PER 15 MG: Performed by: STUDENT IN AN ORGANIZED HEALTH CARE EDUCATION/TRAINING PROGRAM

## 2022-09-19 PROCEDURE — 94664 DEMO&/EVAL PT USE INHALER: CPT

## 2022-09-19 PROCEDURE — 84484 ASSAY OF TROPONIN QUANT: CPT | Performed by: STUDENT IN AN ORGANIZED HEALTH CARE EDUCATION/TRAINING PROGRAM

## 2022-09-19 PROCEDURE — 94799 UNLISTED PULMONARY SVC/PX: CPT

## 2022-09-19 PROCEDURE — 94640 AIRWAY INHALATION TREATMENT: CPT

## 2022-09-19 RX ORDER — KETOROLAC TROMETHAMINE 15 MG/ML
15 INJECTION, SOLUTION INTRAMUSCULAR; INTRAVENOUS ONCE
Status: COMPLETED | OUTPATIENT
Start: 2022-09-19 | End: 2022-09-19

## 2022-09-19 RX ORDER — SODIUM CHLORIDE 0.9 % (FLUSH) 0.9 %
10 SYRINGE (ML) INJECTION AS NEEDED
Status: DISCONTINUED | OUTPATIENT
Start: 2022-09-19 | End: 2022-09-19 | Stop reason: HOSPADM

## 2022-09-19 RX ORDER — METHYLPREDNISOLONE SODIUM SUCCINATE 125 MG/2ML
125 INJECTION, POWDER, LYOPHILIZED, FOR SOLUTION INTRAMUSCULAR; INTRAVENOUS ONCE
Status: DISCONTINUED | OUTPATIENT
Start: 2022-09-19 | End: 2022-09-19 | Stop reason: HOSPADM

## 2022-09-19 RX ORDER — ALBUTEROL SULFATE 90 UG/1
2 AEROSOL, METERED RESPIRATORY (INHALATION) EVERY 4 HOURS PRN
Qty: 1 G | Refills: 0 | Status: SHIPPED | OUTPATIENT
Start: 2022-09-19

## 2022-09-19 RX ORDER — ACETAMINOPHEN 500 MG
1000 TABLET ORAL ONCE
Status: COMPLETED | OUTPATIENT
Start: 2022-09-19 | End: 2022-09-19

## 2022-09-19 RX ORDER — METHYLPREDNISOLONE 4 MG/1
TABLET ORAL
Qty: 21 TABLET | Refills: 0 | Status: SHIPPED | OUTPATIENT
Start: 2022-09-19 | End: 2023-01-19

## 2022-09-19 RX ORDER — IPRATROPIUM BROMIDE AND ALBUTEROL SULFATE 2.5; .5 MG/3ML; MG/3ML
3 SOLUTION RESPIRATORY (INHALATION) ONCE
Status: COMPLETED | OUTPATIENT
Start: 2022-09-19 | End: 2022-09-19

## 2022-09-19 RX ADMIN — ACETAMINOPHEN 1000 MG: 500 TABLET ORAL at 06:25

## 2022-09-19 RX ADMIN — IPRATROPIUM BROMIDE AND ALBUTEROL SULFATE 3 ML: .5; 3 SOLUTION RESPIRATORY (INHALATION) at 07:00

## 2022-09-19 RX ADMIN — KETOROLAC TROMETHAMINE 15 MG: 15 INJECTION, SOLUTION INTRAMUSCULAR; INTRAVENOUS at 06:25

## 2022-09-19 NOTE — ED PROVIDER NOTES
EMERGENCY DEPARTMENT HISTORY AND PHYSICAL EXAM    Patient Name: Alex Ghotra    Chief Complaint   Patient presents with   • Rib Pain       History of Presenting Illness:  Alex Ghotra is a very pleasant 52 y.o. male with history of AIDS on highly active antiretroviral therapy as well as a prior history of hep C currently on antiviral therapy who presents to the emergency department due to right-sided chest wall pain.    Patient states that he has been coughing recently.  He does have a history of smoking cigarettes.  He states that when he coughs he has right-sided chest wall pain on his ribs.  He states he went to see his primary care doctor but they recommend he get an x-ray and go to the emergency department.  He denies any significant shortness of breath.  He has had some sneezing but otherwise denies any recent fevers or chills no known sick contacts.  Denies any nausea vomiting or abdominal pain.    Past Medical History:   Past Medical History:   Diagnosis Date   • AIDS (HCC)    • Asthma    • Hep C w/ coma, chronic        Past Surgical History:   Past Surgical History:   Procedure Laterality Date   • APPENDECTOMY     • COLONOSCOPY      2013? ([facility]) polyps   • COLONOSCOPY N/A 06/09/2022    Procedure: COLONOSCOPY WITH ANESTHESIA;  Surgeon: Michael Israel MD;  Location: [facility] ENDOSCOPY;  Service: Gastroenterology;  Laterality: N/A;  pre hx colon polyp  post; normal  Alice Anderson MD   • ENDOSCOPY     • ENDOSCOPY N/A 06/09/2022    Procedure: ESOPHAGOGASTRODUODENOSCOPY WITH ANESTHESIA;  Surgeon: Michael Israel MD;  Location: [facility] ENDOSCOPY;  Service: Gastroenterology;  Laterality: N/A;  pre dysphagia  post candidiasis; stricture dilated  Alice Anderson MD   • MANDIBLE FRACTURE SURGERY         Social History:   Daily tobacco  Denies EtOH  Former methamphetamine, now abstinent 9 months; denies marijuana, cocaine, or IV drugs    Allergies:   Allergies   Allergen Reactions    • Amoxicillin Other (See Comments)     unknown   • Bactrim [Sulfamethoxazole-Trimethoprim]    • Other      SOME HIV MEDICATION, DONT KNOW NAME    • Remeron [Mirtazapine] Other (See Comments)     unknown   • Ziagen [Abacavir] Other (See Comments)     unknown       Medications:     Current Facility-Administered Medications:   •  ipratropium-albuterol (DUO-NEB) nebulizer solution 3 mL, 3 mL, Nebulization, Once, Balaji Molina MD  •  methylPREDNISolone sodium succinate (SOLU-Medrol) injection 125 mg, 125 mg, Intravenous, Once, Balaji Molina MD  •  Insert peripheral IV, , , Once **AND** sodium chloride 0.9 % flush 10 mL, 10 mL, Intravenous, PRN, Balaji Molina MD    Current Outpatient Medications:   •  Cholecalciferol (Vitamin D3) 50 MCG (2000 UT) capsule, TAKE 1 CAPSULE BY MOUTH DAILY WITH A MEAL, Disp: , Rfl:   •  darunavir ethanolate (PREZISTA) 800 MG tablet tablet, Take 800 mg by mouth., Disp: , Rfl:   •  Darunavir-Cobicistat (Prezcobix) 800-150 MG per tablet, Take  by mouth Daily., Disp: , Rfl:   •  dextromethorphan-guaifenesin (ROBITUSSIN-DM)  MG/5ML liquid, Take 5 mL by mouth Every 12 (Twelve) Hours for 7 days. Take as needed., Disp: 118 mL, Rfl: 0  •  dolutegravir (Tivicay) 50 MG tablet, Take 50 mg by mouth Daily., Disp: , Rfl:   •  doxycycline (MONODOX) 100 MG capsule, Take 1 capsule by mouth 2 (Two) Times a Day for 7 days., Disp: 14 capsule, Rfl: 0  •  Emtricitabine-Tenofovir AF (Descovy) 200-25 MG per tablet, Take  by mouth Daily., Disp: , Rfl:   •  etravirine (INTELENCE) 100 MG tablet, Take  by mouth., Disp: , Rfl:   •  ibuprofen (ADVIL,MOTRIN) 800 MG tablet, Take 800 mg by mouth Daily As Needed., Disp: , Rfl:   •  methylPREDNISolone (MEDROL) 4 MG dose pack, Take 4 tablets by mouth Daily for 5 days. Take as directed on package instructions., Disp: 21 tablet, Rfl: 0  •  miSOPROStol (CYTOTEC) 200 MCG tablet, Take 200 mcg by mouth 4 (Four) Times a Day., Disp: , Rfl:   •  multivitamin with  "minerals tablet tablet, Take 1 tablet by mouth Daily., Disp: , Rfl:   •  Nutritional Supplements (GUNNAR PO), Take  by mouth Daily., Disp: , Rfl:   •  rilpivirine (EDURANT) 25 MG tablet tablet, Take 25 mg by mouth Daily., Disp: , Rfl:   •  Sofosbuvir-Velpatasvir 400-100 MG tablet, , Disp: , Rfl:   •  tadalafil (Cialis) 20 MG tablet, 1/2 to 1 tab by mouth 1 hour prior to intercourse, Disp: 30 tablet, Rfl: 11  •  vitamin C (ASCORBIC ACID) 250 MG tablet, Take 250 mg by mouth Daily., Disp: , Rfl:     Review of Systems:  A full review of systems was obtained and is negative unless otherwise stated in HPI.    Physical Exam:  VS: /97 (BP Location: Right arm, Patient Position: Standing)   Pulse 74   Temp 97.7 °F (36.5 °C) (Oral)   Resp 20   Ht 170.2 cm (67\")   Wt 68 kg (150 lb)   SpO2 96%   BMI 23.49 kg/m²   GENERAL: Well-appearing middle-age man sitting up in chair no acute distress; well nourished, well developed, awake, alert, no acute distress, nontoxic appearing, comfortable  EYES: PERRL, sclera anicteric, extra-occular movements grossly intact, symmetric lids  EARS, NOSE, MOUTH, THROAT: atraumatic external nose and ears, moist mucous membranes  NECK: Symmetric, trachea midline, no thyromegaly, no adenopathy, no meningismus  RESPIRATORY: No inspiratory expiratory wheezing; breath sounds bilaterally; no stridor  CARDIOVASCULAR: No murmurs or gallops, peripheral pulses 2+ and equal in all extremities  GI: Soft, nontender, nondistended, bowel sounds present, no hepatosplenomegaly  LYMPHATIC: no lymphadenopathy  MUSCULOSKELETAL/EXTREMITIES: Extremities without obvious deformity, no cyanosis or clubbing  SKIN: warm and dry with no obvious rashes  NEUROLOGIC: moving all 4 extremities symmetrically, CN II-XII grossly intact  PSYCHIATRIC: alert, pleasant and cooperative. Appropriate mood and affect.    Labs:  Labs Reviewed   RESPIRATORY PANEL PCR W/ COVID-19 (SARS-COV-2) [facility] IN-HOUSE, NP " SWAB IN UTM/VTP, 3-4 HR TAT   BASIC METABOLIC PANEL   CBC WITH AUTO DIFFERENTIAL   CBC AND DIFFERENTIAL    Narrative:     The following orders were created for panel order CBC & Differential.  Procedure                               Abnormality         Status                     ---------                               -----------         ------                     CBC Auto Differential[[id number]]                                                         Please view results for these tests on the individual orders.           Radiology:  XR Chest 2 View   Final Result   1. No active cardiopulmonary disease.   This report was finalized on 09/19/2022 06:43 by Dr. Destini Giron MD.          Medical Decision Making:  Alex Ghotra is a 52 y.o. male with history of prior TB as well as hepatitis C on treatment for both who presents emerged part due to right-sided chest wall pain in setting of cough.    No tachypnea no hypoxia afebrile.    Labs were ordered and pending.    Imaging was ordered and reviewed.  Two view chest x-ray with no acute abnormality.    Nursing notes were reviewed.    The patient was given oral Tylenol and intramuscular ketorolac.    On my reevaluation patient with significant inspiratory and wheezing.  Started coughing and feeling much worse.    Started IV methylprednisolone as well as a DuoNeb.    Patient's presentation is most consistent with COPD exacerbation.  In the setting of his cough I think his right-sided rib pain is consistent with intercostal muscle rib pain.  Patient is a significant cough which I think is contributing to his symptoms.  Given its right lateral nature and pain and tenderness over his rib cage I have high suspicion this is the cause of his pain.  There is no evidence of spontaneous pneumothorax on chest x-ray.  Also no evidence of focal consolidation or pneumonia.  Patient with no significant trauma that would be concerning for significant acute rib fractures.  The  patient is a smoker he is no wheezing on exam that would be concerning for COPD exacerbation.  Also no tachypnea or signs of respiratory distress.    At time of my signout to the [facility] emergency medicine attending Dr. Platt, who is assuming complete care of this patient, patient is pending reevaluation after breathing treatments.    ED Diagnosis:  Intercostal muscle pain; Chest wall pain; Sneezing; History of HIV infection (HCC)      Disposition: Pending reevaluation after breathing treatments at time of my signout to the [facility] emergency medicine attending, Dr. Platt         Signed:  Balaji Molina MD  Emergency Medicine Physician    Please note that portions of this note were completed with a voice recognition program.      Balaji Molina MD  09/19/22 0653

## 2022-09-19 NOTE — ED NOTES
"Pt in bed. No s/s distress noted. Denies pain/discomforts. Asmt completed. Pt on continuous monitoring. Pt states he feels fine and ready to go home. Admits to chronic cough- non productive. \"I always have it.\" denies fever/chills. Speaks full sentences without diff. Pt request cup of coffee. Informed will check with PMD- verbalized understanding. POC discussed. Verbalized understanding. Will cont monitoring. Call light in reach. Advised to call any needs. MD cleared PO intake- coffee provided.  "

## 2022-09-19 NOTE — ED NOTES
Pt in bed. No s/s distress noted. Denies any needs/pain/discomforts. POC dicussed. Will cont monitoring. Advised to call any needs. Call light in reach.

## 2022-09-19 NOTE — ED NOTES
Pt called out standing outside the door, requesting to go to bathroom. Shown where bathroom is located. Ambulated without diff.

## 2022-09-19 NOTE — ED PROVIDER NOTES
Subjective   History of Present Illness    Review of Systems    Past Medical History:   Diagnosis Date   • AIDS (HCC)    • Asthma    • Hep C w/ coma, chronic        Allergies   Allergen Reactions   • Amoxicillin Other (See Comments)     unknown   • Bactrim [Sulfamethoxazole-Trimethoprim]    • Other      SOME HIV MEDICATION, DONT KNOW NAME    • Remeron [Mirtazapine] Other (See Comments)     unknown   • Ziagen [Abacavir] Other (See Comments)     unknown       Past Surgical History:   Procedure Laterality Date   • APPENDECTOMY     • COLONOSCOPY      2013? ([facility]) polyps   • COLONOSCOPY N/A 06/09/2022    Procedure: COLONOSCOPY WITH ANESTHESIA;  Surgeon: Michael Israel MD;  Location:  [facility]COPY;  Service: Gastroenterology;  Laterality: N/A;  pre hx colon polyp  post; normal  Alice Anderson MD   • ENDOSCOPY     • ENDOSCOPY N/A 06/09/2022    Procedure: ESOPHAGOGASTRODUODENOSCOPY WITH ANESTHESIA;  Surgeon: Michael Israel MD;  Location:  [facility];  Service: Gastroenterology;  Laterality: N/A;  pre dysphagia  post candidiasis; stricture dilated  Alice Anderson MD   • MANDIBLE FRACTURE SURGERY         Family History   Problem Relation Age of Onset   • Heart attack Mother    • Diabetes Mother    • Heart attack Father    • Liver cancer Father    • Colon cancer Neg Hx    • Colon polyps Neg Hx        Social History     Socioeconomic History   • Marital status:    Tobacco Use   • Smoking status: Current Every Day Smoker     Packs/day: 1.50     Types: Cigarettes   • Smokeless tobacco: Never Used   Vaping Use   • Vaping Use: Never used   Substance and Sexual Activity   • Alcohol use: No   • Drug use: Not Currently     Types: Methamphetamines   • Sexual activity: Defer           Objective   Physical Exam    Procedures           ED Course  ED Course as of 09/19/22 0910   Mon Sep 19, 2022   0758 This patient was seen by Dr. Molina please refer to his records for the details pending respiratory  panel studies. [TS]   0758 PERC score is low probability Wells score is 0 [TS]   0904 The gentleman came in with chest wall pain no flank pain.  Reproducible on palpation taking deep breath could be pleurisy cardiac markers are negative chemistry within normal limits.  White cell count minimally elevated [TS]   0905 Chest x-ray is negative. [TS]   0905 Back and discussed this case at length with the patient this patient was seen by Dr. Molina. [TS]   0905 The possibility of rib fractures were entertained the patient said this pain started after he had a coughing fit.  I have informed him that chest x-ray is prone to miss nondisplaced rib fractures.  The possibly of the patient having a pulmonary embolus pneumothorax or other etiologies of pain have been explained to the patient at length.  The pain could be coming from the subdiaphragmatic area also this has been discussed with him.  He is awake alert oriented x3 I have offered him a CT angio of the chest and CT of the abdomen pelvis for further delineation of this pain.  The patient does not want to do the scans he wants to go home the possibility of increased morbidity mortality has been explained the patient patient is awake alert oriented x3 asking appropriate questions and is able to decide for himself he does not want a CAT scan to be performed this time he states he can follow-up with his primary MD he does want an inhaler to be given to him.  The patient can be discharged home with a follow-up. [TS]   0906 Repeat examination reveals some expiratory wheezes.  Heart rate is in normal limits. [TS]   0906 This patient presents with chest pain , patient arrived hemodynamically stable was placed on the monitor and IV access obtained EKG was obtained and did not reveal any malignant/unstable dysrhythmias any acute ST elevation, no evidence of Brugada, or significantly prolonged QT .  Presentation not consistent with other acute, emergent cause of chest pain at this  time.  Low suspicion for acute PE is low risk per Wells and Years criteria and no evidence of DVT such as calf swelling, tenderness, palpable tortuous lower extremity vein, or Homans' sign.  Low suspicion for pneumothorax as the patient is saturating well and has no radiographic evidence of a pneumothorax.  Low suspicion for dissection there is no widened mediastinum, hypotension, pulses deficit, and no tearing back/abdominal pain.  Low suspicion for tamponade as there is no JVD, muffled heart sounds, electrical alternans on EKG, and no hypotension.  Low suspicion for pericarditis as there is no diffuse ST elevation or ID depression and the patient is afebrile.  No evidence of GI bleed per patient's history.  Low suspicion for CHF exacerbation as there is no evidence of volume overload and no evidence of severely elevated BNP.  Low suspicion for pneumonia since the patient has no fever no productive cough no chest x-ray findings of pneumonia and no leukocytosis.  Appears the patient has got pleuritic chest wall pain. [TS]   0907 Heart score 1 [TS]   0907 Risks and benefits of treatments given and alternative treatment options discussed with patient/family. I answered all the questions in simple, plain language, and there was voiced understanding and agreement with plan of care. There were no further questions. Differential diagnosis discussed. Patient/family was advised that the practice of medicine is not always an exact science, and sometimes tests, physical exam, or history may not show the underlying conditions with certainty. Additionally, the condition may change or show itself later after initial presentation. There was also expressed understanding and agreement with this limitation of emergency medicine practice. Patient/family was asked to return to ED if any problem or issues or if condition worsens or does not improved. Patient/family agreed to follow up with PCP/specialist as advised, or return to ED if  unable to see a provider in a timely fashion for continued symptoms.  [TS]      ED Course User Index  [TS] James Platt MD                                           [facility]    Final diagnoses:   Intercostal muscle pain   Chest wall pain   Sneezing   History of HIV infection (HCC)   COPD with acute exacerbation (HCC)       ED Disposition  ED Disposition     ED Disposition   Discharge    Condition   Stable    Comment   --             Alice Anderson MD  [address]  Infectious Disease  [facility] [address]  [phone]               Medication List      New Prescriptions    albuterol sulfate  (90 Base) MCG/ACT inhaler  Commonly known as: PROVENTIL HFA;VENTOLIN HFA;PROAIR HFA  Inhale 2 puffs Every 4 (Four) Hours As Needed for Wheezing.     Diclofenac Sodium 1 % gel gel  Commonly known as: VOLTAREN  Apply 4 g topically to the appropriate area as directed 3 (Three) Times a Day.        Changed    * methylPREDNISolone 4 MG dose pack  Commonly known as: MEDROL  Take 4 tablets by mouth Daily for 5 days. Take as directed on package instructions.  What changed: Another medication with the same name was added. Make sure you understand how and when to take each.     * methylPREDNISolone 4 MG dose pack  Commonly known as: MEDROL  Take as directed on package instructions.  What changed: You were already taking a medication with the same name, and this prescription was added. Make sure you understand how and when to take each.         * This list has 2 medication(s) that are the same as other medications prescribed for you. Read the directions carefully, and ask your doctor or other care provider to review them with you.               Where to Get Your Medications      These medications were sent to [facility] - [place], [place] - [address] - [phone]  - [phone]   [address] [address]    Phone: [phone]   · albuterol sulfate  (90 Base) MCG/ACT inhaler  · Diclofenac Sodium 1 % gel  gel  · methylPREDNISolone 4 MG dose pack          James Platt MD  09/19/22 0930

## 2022-09-19 NOTE — PROGRESS NOTES
YOB: 1970  Location: [facility]  Location Address: [address] [place] [address], [facility], [address] [place] [address]  Location Phone: [phone]    Chief Complaint   Patient presents with   • Difficulty Swallowing     HAD SWALLOW STUDY       History of Present Illness  Alex Ghotra is a 52 y.o. male.  Alex Ghotra is here for follow up of ENT complaints. The patient has had problems with globus sensation and difficulty swallowing.     Patient states he continues to feel as if something is stuck in his throat.  He also complains of hoarseness.   He complains of a dry cough. He states he went back on his reflux medication, but he is unsure what this is     He is smoking approximately 1 ppd   He has a history of iv drug use and marijuana use that he stopped in January     Patient also has history of left sided hearing loss that has been present for several years; at last visit he was complaining of left ear drainage and pain and cerumen was removed   He feels as if ear has improved        Procedure visit with Nancy West AUD (2022)    Study Result    Narrative & Impression   EXAMINATION: FL VIDEO SWALLOW W SPEECH SINGLE-CONTRAST-     8/3/2022 8:48 AM CDT     HISTORY: dysphagia; R13.10-Dysphagia, unspecified     The fluoroscopy is performed during ingestion of thin, nectar  consistency, honey consistency, pudding consistency, fruit and solid  contrast boluses.     There is no previous study for comparison.     The study was performed under supervision of speech therapist.     There is normal preparation and propagation of all the above-mentioned  boluses. No nasopharyngeal reflux, aspiration or laryngeal penetration.  No cough.     IMPRESSION:  1. Normal swallowing function study.  2. Fluoroscopy time: 54 seconds.  3. The dose: 1.76 mGy.  4. Number of images: 1  This report was finalized on 2022 10:30 by Dr. Destini Giron MD.          Past Medical History:   Diagnosis Date   •  AIDS (HCC)    • Asthma    • Hep C w/ coma, chronic        Past Surgical History:   Procedure Laterality Date   • APPENDECTOMY     • COLONOSCOPY      2013? ([facility]) polyps   • COLONOSCOPY N/A 06/09/2022    Procedure: COLONOSCOPY WITH ANESTHESIA;  Surgeon: Michael Israel MD;  Location: [facility];  Service: Gastroenterology;  Laterality: N/A;  pre hx colon polyp  post; normal  Alice Anderson MD   • ENDOSCOPY     • ENDOSCOPY N/A 06/09/2022    Procedure: ESOPHAGOGASTRODUODENOSCOPY WITH ANESTHESIA;  Surgeon: Michael Israel MD;  Location: [facility];  Service: Gastroenterology;  Laterality: N/A;  pre dysphagia  post candidiasis; stricture dilated  Alice Anderson MD   • MANDIBLE FRACTURE SURGERY         Outpatient Medications Marked as Taking for the 9/20/22 encounter (Office Visit) with Duke Camacho MD   Medication Sig Dispense Refill   • albuterol sulfate  (90 Base) MCG/ACT inhaler Inhale 2 puffs Every 4 (Four) Hours As Needed for Wheezing. 1 g 0   • Cholecalciferol (Vitamin D3) 50 MCG (2000 UT) capsule TAKE 1 CAPSULE BY MOUTH DAILY WITH A MEAL     • darunavir ethanolate (PREZISTA) 800 MG tablet tablet Take 800 mg by mouth.     • Darunavir-Cobicistat (Prezcobix) 800-150 MG per tablet Take  by mouth Daily.     • dextromethorphan-guaifenesin (ROBITUSSIN-DM)  MG/5ML liquid Take 5 mL by mouth Every 12 (Twelve) Hours for 7 days. Take as needed. 118 mL 0   • Diclofenac Sodium (VOLTAREN) 1 % gel gel Apply 4 g topically to the appropriate area as directed 3 (Three) Times a Day. 150 g 0   • dolutegravir (Tivicay) 50 MG tablet Take 50 mg by mouth Daily.     • doxycycline (MONODOX) 100 MG capsule Take 1 capsule by mouth 2 (Two) Times a Day for 7 days. 14 capsule 0   • Emtricitabine-Tenofovir AF (Descovy) 200-25 MG per tablet Take  by mouth Daily.     • etravirine (INTELENCE) 100 MG tablet Take  by mouth.     • ibuprofen (ADVIL,MOTRIN) 800 MG tablet Take 800 mg by mouth Daily  As Needed.     • methylPREDNISolone (MEDROL) 4 MG dose pack Take 4 tablets by mouth Daily for 5 days. Take as directed on package instructions. 21 tablet 0   • miSOPROStol (CYTOTEC) 200 MCG tablet Take 200 mcg by mouth 4 (Four) Times a Day.     • multivitamin with minerals tablet tablet Take 1 tablet by mouth Daily.     • Nutritional Supplements (GUNNAR PO) Take  by mouth Daily.     • rilpivirine (EDURANT) 25 MG tablet tablet Take 25 mg by mouth Daily.     • Sofosbuvir-Velpatasvir 400-100 MG tablet      • tadalafil (Cialis) 20 MG tablet 1/2 to 1 tab by mouth 1 hour prior to intercourse 30 tablet 11       Amoxicillin, Bactrim [sulfamethoxazole-trimethoprim], Other, Remeron [mirtazapine], and Ziagen [abacavir]    Family History   Problem Relation Age of Onset   • Heart attack Mother    • Diabetes Mother    • Heart attack Father    • Liver cancer Father    • Colon cancer Neg Hx    • Colon polyps Neg Hx        Social History     Socioeconomic History   • Marital status:    Tobacco Use   • Smoking status: Current Every Day Smoker     Packs/day: 1.00     Types: Cigarettes   • Smokeless tobacco: Never Used   Vaping Use   • Vaping Use: Never used   Substance and Sexual Activity   • Alcohol use: No   • Drug use: Not Currently     Types: Methamphetamines   • Sexual activity: Defer       Review of Systems   Constitutional: Negative.    HENT: Positive for hearing loss, trouble swallowing and voice change.         Admits globus sensation      Respiratory: Positive for cough.    Genitourinary: Negative.    Neurological: Negative.        Vitals:    09/20/22 1539   BP: 140/90   Pulse: 71   Resp: 16   Temp: 98.6 °F (37 °C)       Body mass index is 23.81 kg/m².    Objective     Physical Exam  Vitals reviewed.   Constitutional:       Appearance: Normal appearance. He is normal weight.   HENT:      Head: Normocephalic.      Right Ear: Tympanic membrane, ear canal and external ear normal.      Left Ear: Tympanic membrane, ear  canal and external ear normal. Decreased hearing noted.      Nose: Nose normal.      Mouth/Throat:      Lips: Pink.      Mouth: Mucous membranes are moist.      Pharynx: Posterior oropharyngeal erythema present.      Comments: Dentures     Neurological:      Mental Status: He is alert.         Assessment & Plan   Diagnoses and all orders for this visit:    1. Laryngopharyngeal reflux (Primary)    2. Tobacco use    3. History of HIV infection (HCC)    4. History of hepatitis C    5. Dysphagia, unspecified type      * Surgery not found *  No orders of the defined types were placed in this encounter.    Return in about 4 months (around 1/20/2023) for Recheck.     Smoking cessation discussed at length   Continue reflux medications   Call for confirmation of medication currently taking   Reflux precautions discussed     Patient Instructions   Gastroesophageal Reflux Disease (Laryngopharyngeal Reflux), Adult  Gastroesophageal reflux disease (GERD) and/or Laryngopharyngeal Reflux, (LPR) happens when acid from your stomach flows up into the esophagus and/or throat and voicebox or larynx. When acid comes in contact with the these organs, the acid can cause soreness (inflammation). Over time, GERD may create small holes (ulcers) in the lining of the esophagus and may lead to the development of hoarseness, difficulty swallowing,   feeling of something stuck in the throat, increased mucous or drainage and even predispose to the development of malignancies, (cancer).    CAUSES   · Increased body weight. This puts pressure on the stomach, making acid rise from the stomach into the esophagus.  · Smoking. This increases acid production in the stomach.  · Drinking alcohol. This causes decreased pressure in the lower esophageal sphincter (valve or ring of muscle between the esophagus and stomach), allowing acid from the stomach into the esophagus.  · Late evening meals and a full stomach. This increases pressure and acid production  in the stomach.  · A malformed lower esophageal sphincter  · Diet which can include avoidance of gluten and dairy products  · Age  SYMPTOMS   · Burning pain in the lower part of the mid-chest behind the breastbone and in the mid-stomach area. This may occur twice a week or more often.  · Trouble swallowing.  · Sore throat.  · Dry cough.  · Asthma-like symptoms including chest tightness, shortness of breath, or wheezing.  · Globus sensation-something stuck in the throat/fullness  · Hoarseness  DIAGNOSIS   Your caregiver may be able to diagnose GERD based on your symptoms. In some cases, X-rays and other tests may be done to check for complications or to check the condition of your stomach and esophagus.  You may need to see another doctor.  TREATMENT   Over-the-counter or prescription medicines to help decrease acid production.   Dietary and behavioral modifications or changes may be also recommended.  HOME CARE INSTRUCTIONS   · Change the factors that you can control. Ask your caregiver for guidance concerning weight loss, quitting smoking, and alcohol consumption.  · Avoid foods and drinks that make your symptoms worse, and MAY include such as:  ¨ Caffeine or alcoholic drinks.  ¨ Chocolate.  ¨ Gluten containing foods  ¨ Dairy  ¨ Peppermint or mint flavorings.  ¨ Garlic and onions.  ¨ Spicy foods.  ¨ Citrus fruits, such as oranges, aristeo, or limes.  ¨ Tomato-based foods such as sauce, chili, salsa, and pizza.  ¨ Fried and fatty foods.  · Avoid lying down for the 3 hours prior to your bedtime or prior to taking a nap.  · Eat small, frequent meals instead of large meals.  · Wear loose-fitting clothing. Do not wear anything tight around your waist that causes pressure on your stomach.  · Raise the head of your bed 6 to 8 inches with wood blocks to help you sleep. Extra pillows will not help.  · Only take over-the-counter or prescription medicines for pain, discomfort, or fever as directed by your caregiver.  · Do not  take aspirin, ibuprofen, or other nonsteroidal anti-inflammatory drugs if possible (NSAIDs).  SEEK IMMEDIATE MEDICAL CARE IF:   · You have pain in your arms, neck, jaw, teeth, or back.  · Your pain increases or changes in intensity or duration.  · You develop nausea, vomiting, or sweating (diaphoresis).  · You develop shortness of breath, or you faint.  · Your vomit is green, yellow, black, or looks like coffee grounds or blood.  · Your stool is red, bloody, or black.  These symptoms could be signs of other problems, such as heart disease, gastric bleeding, or esophageal bleeding.  MAKE SURE YOU:   · Understand these instructions.  · Will watch your condition.  · Will get help right away if you are not doing well or get worse.     This information is not intended to replace advice given to you by your physician. Make sure you discuss any questions you have with your health care provider.     Modified by Duke Camacho MD, FACS 9/8/2016.  Document Released: 09/27/2006 Document Revised: 01/08/2016 Document Reviewed: 04/13/2016  [employer] Interactive Patient Education ©2016 [employer]. I advised the patient of the risks in continuing to use tobacco and recommended complete cessation, The inherent risks including the risk of disability, development of a malignancy and/or death was discussed.  The patient indicated understanding.

## 2022-09-19 NOTE — PROGRESS NOTES
AUDIOMETRIC EVALUATION      Name:  Alex Ghotra  :  1970  Age:  52 y.o.  Date of Evaluation:  2022       History:  Reason for visit:  Mr. Ghotra is seen today at the request of Duke Camacho MD for a hearing evaluation. Patient was seen by ENT provider on 2022 and had complaints of left ear pain and left otorrhea. Patient states he has trouble hearing out of his left ear. He states he has had this problem since he was about 4 year old. Previous audio was on 2011.     PE Tubes:  no, both ears   Other otologic surgical history: no, both ears  Tinnitus:  no, both ears  Dizziness:  no  Noise Exposure: no  Aural Fullness:  yes, left ear   Otalgia: no, both ears  Family history of hearing loss: no  Other significant history: HIV  Head trauma requiring hospital stay: yes, or  stayed 3 weeks in hospital  Previous brain MRI: no      EVALUATION:      RESULTS:    Otoscopic Evaluation:  Bilateral: clear canal, tympanic membrane visualized     Tympanometry (226 Hz):  Bilateral: Type A- normal    Pure Tone Audiometry:    Right: normal through 4kHz, sloping to moderately severe high frequency hearing loss  Left: normal precipitously sloping to profound sensorineural hearing loss     Speech Audiometry:   Right: Speech Reception Threshold (SRT) was obtained at 10 dBHL  Word Recognition scores- excellent/within normal limits (90 - 100%) using NU-6 List 4A, 25 words  Left: Speech Reception Threshold (SRT) was obtained at 80 dBHL with 60 dbHL masking in opposite ear  Word Recognition scores- very poor (< 52%) using NU-6 List 4A, 25 words    IMPRESSIONS:  Tympanometry showed normal middle ear pressure and static compliance, for both ears.  Pure tone thresholds for the right ear show a moderately severe high frequency hearing loss. Loss is likely sensorineural due to normal tympanograms. Right ear results suggest normal outer/middle ear function and abnormal cochlear/retrocochlear  function. Pure tone thresholds for the left ear show a normal precipitously sloping to profound sensorineural hearing loss, suggesting normal outer/middle ear function and abnormal cochlear/retrocochlear function. Large asymmetry with the left ear pure tone and word recognition scores significantly worse than the right. Patient was counseled with regard to the findings.    Amplification needs:  Patient could benefit from a CROS system or possibly a BAHA    Diagnosis:  1. Asymmetrical sensorineural hearing loss         RECOMMENDATIONS/PLAN:  Follow-up recommendations per Duke Camacho MD    Audiologic follow-up in 1 year  Return for audiologic testing if noticing changes in hearing or concerns arise  Hearing aid/ Baha evaluation and counseling upon medical clearance and patient motivation  Use communication strategies  Use hearing protection around loud noises     EDUCATION:  Discussed results and recommendations with patient. Questions were addressed and the patient was encouraged to contact our department should concerns arise.        Ines Montemayor [facility]  Licensed Audiologist

## 2022-09-20 ENCOUNTER — OFFICE VISIT (OUTPATIENT)
Dept: OTOLARYNGOLOGY | Facility: CLINIC | Age: 52
End: 2022-09-20

## 2022-09-20 ENCOUNTER — PROCEDURE VISIT (OUTPATIENT)
Dept: OTOLARYNGOLOGY | Facility: CLINIC | Age: 52
End: 2022-09-20

## 2022-09-20 VITALS
RESPIRATION RATE: 16 BRPM | HEIGHT: 67 IN | BODY MASS INDEX: 23.86 KG/M2 | TEMPERATURE: 98.6 F | SYSTOLIC BLOOD PRESSURE: 140 MMHG | HEART RATE: 71 BPM | DIASTOLIC BLOOD PRESSURE: 90 MMHG | WEIGHT: 152 LBS

## 2022-09-20 DIAGNOSIS — H90.3 ASYMMETRICAL SENSORINEURAL HEARING LOSS: Primary | ICD-10-CM

## 2022-09-20 DIAGNOSIS — Z72.0 TOBACCO USE: ICD-10-CM

## 2022-09-20 DIAGNOSIS — Z86.19 HISTORY OF HEPATITIS C: ICD-10-CM

## 2022-09-20 DIAGNOSIS — B20 HISTORY OF HIV INFECTION: ICD-10-CM

## 2022-09-20 DIAGNOSIS — R13.10 DYSPHAGIA, UNSPECIFIED TYPE: ICD-10-CM

## 2022-09-20 DIAGNOSIS — K21.9 LARYNGOPHARYNGEAL REFLUX: Primary | ICD-10-CM

## 2022-09-20 PROCEDURE — 99213 OFFICE O/P EST LOW 20 MIN: CPT | Performed by: NURSE PRACTITIONER

## 2022-09-20 PROCEDURE — 92557 COMPREHENSIVE HEARING TEST: CPT

## 2022-09-20 PROCEDURE — 31575 DIAGNOSTIC LARYNGOSCOPY: CPT | Performed by: OTOLARYNGOLOGY

## 2022-09-20 PROCEDURE — 92567 TYMPANOMETRY: CPT

## 2022-09-20 NOTE — PROGRESS NOTES
OPERATIVE NOTE:  Alex Ghotra    DATE OF PROCEDURE: 9/20/2022    PROCEDURE:   Flexible Fiberoptic Laryngoscopy    ANESTHESIA:  None    REASON FOR PROCEDURE:  Procedure was recommended for persistant hoarseness and suspicious clinical behavior  Risks, benefits and alternatives were discussed.      DETAILS of OPERATION:  The patient was seated in the exam chair.  A flexible fiberoptic laryngoscopy was performed through the oral cavity.  The scope was introduced into the oral cavity and directed to the level of the glottis, examining the structures of the oropharynx, base of tongue, vallecula, supraglottic larynx, glottic larynx, and hypopharynx.      FINDINGS:  Mucosal surfaces:   The mucosal surfaces demonstrated normal mucosa surfaces with moderate inflammation    Base of tongue:  The base of tongue was found to have no mass or lesion.    Epiglottis:  The epiglottis was found to have no mass or lesion.    Aryepiglottic fold:  The AE folds were found to have no mass or lesion.    False Vocal Fold:  The false cords were found to have no mass or lesion.    True Vocal Cord:  The true vocal cords were found to have no mass or lesion. Both true vocal cords adduct and abduct normally    Arytenoid:   The arytenoids were found to have moderate inter-arytenoid edema and bridger arytenoid edema and erythema.    Hypopharynx:  The hypopharynx was found to have no mass or lesion.    The patient tolerated procedure well.

## 2022-09-20 NOTE — PATIENT INSTRUCTIONS
Gastroesophageal Reflux Disease (Laryngopharyngeal Reflux), Adult  Gastroesophageal reflux disease (GERD) and/or Laryngopharyngeal Reflux, (LPR) happens when acid from your stomach flows up into the esophagus and/or throat and voicebox or larynx. When acid comes in contact with the these organs, the acid can cause soreness (inflammation). Over time, GERD may create small holes (ulcers) in the lining of the esophagus and may lead to the development of hoarseness, difficulty swallowing,   feeling of something stuck in the throat, increased mucous or drainage and even predispose to the development of malignancies, (cancer).    CAUSES   Increased body weight. This puts pressure on the stomach, making acid rise from the stomach into the esophagus.  Smoking. This increases acid production in the stomach.  Drinking alcohol. This causes decreased pressure in the lower esophageal sphincter (valve or ring of muscle between the esophagus and stomach), allowing acid from the stomach into the esophagus.  Late evening meals and a full stomach. This increases pressure and acid production in the stomach.  A malformed lower esophageal sphincter  Diet which can include avoidance of gluten and dairy products  Age  SYMPTOMS   Burning pain in the lower part of the mid-chest behind the breastbone and in the mid-stomach area. This may occur twice a week or more often.  Trouble swallowing.  Sore throat.  Dry cough.  Asthma-like symptoms including chest tightness, shortness of breath, or wheezing.  Globus sensation-something stuck in the throat/fullness  Hoarseness  DIAGNOSIS   Your caregiver may be able to diagnose GERD based on your symptoms. In some cases, X-rays and other tests may be done to check for complications or to check the condition of your stomach and esophagus.  You may need to see another doctor.  TREATMENT   Over-the-counter or prescription medicines to help decrease acid production.   Dietary and behavioral modifications  or changes may be also recommended.  HOME CARE INSTRUCTIONS   Change the factors that you can control. Ask your caregiver for guidance concerning weight loss, quitting smoking, and alcohol consumption.  Avoid foods and drinks that make your symptoms worse, and MAY include such as:  Caffeine or alcoholic drinks.  Chocolate.  Gluten containing foods  Dairy  Peppermint or mint flavorings.  Garlic and onions.  Spicy foods.  Citrus fruits, such as oranges, aristeo, or limes.  Tomato-based foods such as sauce, chili, salsa, and pizza.  Fried and fatty foods.  Avoid lying down for the 3 hours prior to your bedtime or prior to taking a nap.  Eat small, frequent meals instead of large meals.  Wear loose-fitting clothing. Do not wear anything tight around your waist that causes pressure on your stomach.  Raise the head of your bed 6 to 8 inches with wood blocks to help you sleep. Extra pillows will not help.  Only take over-the-counter or prescription medicines for pain, discomfort, or fever as directed by your caregiver.  Do not take aspirin, ibuprofen, or other nonsteroidal anti-inflammatory drugs if possible (NSAIDs).  SEEK IMMEDIATE MEDICAL CARE IF:   You have pain in your arms, neck, jaw, teeth, or back.  Your pain increases or changes in intensity or duration.  You develop nausea, vomiting, or sweating (diaphoresis).  You develop shortness of breath, or you faint.  Your vomit is green, yellow, black, or looks like coffee grounds or blood.  Your stool is red, bloody, or black.  These symptoms could be signs of other problems, such as heart disease, gastric bleeding, or esophageal bleeding.  MAKE SURE YOU:   Understand these instructions.  Will watch your condition.  Will get help right away if you are not doing well or get worse.     This information is not intended to replace advice given to you by your physician. Make sure you discuss any questions you have with your health care provider.     Modified by Duke Camacho,  MD, FACS 9/8/2016.  Document Released: 09/27/2006 Document Revised: 01/08/2016 Document Reviewed: 04/13/2016  [employer] Interactive Patient Education ©2016 [employer]. I advised the patient of the risks in continuing to use tobacco and recommended complete cessation, The inherent risks including the risk of disability, development of a malignancy and/or death was discussed.  The patient indicated understanding.

## 2022-09-21 ENCOUNTER — TELEPHONE (OUTPATIENT)
Dept: OTOLARYNGOLOGY | Facility: CLINIC | Age: 52
End: 2022-09-21

## 2022-09-21 RX ORDER — MISOPROSTOL 200 UG/1
200 TABLET ORAL 4 TIMES DAILY
COMMUNITY
End: 2023-01-19 | Stop reason: SDUPTHER

## 2022-09-29 ENCOUNTER — TRANSCRIBE ORDERS (OUTPATIENT)
Dept: ADMINISTRATIVE | Facility: HOSPITAL | Age: 52
End: 2022-09-29

## 2022-09-29 DIAGNOSIS — R20.2 PARESTHESIA: Primary | ICD-10-CM

## 2022-09-30 ENCOUNTER — TRANSCRIBE ORDERS (OUTPATIENT)
Dept: PHYSICAL THERAPY | Facility: CLINIC | Age: 52
End: 2022-09-30

## 2022-09-30 DIAGNOSIS — M54.50 LOW BACK PAIN, UNSPECIFIED BACK PAIN LATERALITY, UNSPECIFIED CHRONICITY, UNSPECIFIED WHETHER SCIATICA PRESENT: Primary | ICD-10-CM

## 2022-10-11 ENCOUNTER — TRANSCRIBE ORDERS (OUTPATIENT)
Dept: ADMINISTRATIVE | Facility: HOSPITAL | Age: 52
End: 2022-10-11

## 2022-10-11 DIAGNOSIS — R10.11 RUQ PAIN: Primary | ICD-10-CM

## 2022-10-12 RX ORDER — FLUTICASONE PROPIONATE 50 MCG
2 SPRAY, SUSPENSION (ML) NASAL DAILY
Qty: 16 G | Refills: 5 | Status: SHIPPED | OUTPATIENT
Start: 2022-10-12

## 2022-10-13 ENCOUNTER — HOSPITAL ENCOUNTER (OUTPATIENT)
Dept: ULTRASOUND IMAGING | Facility: HOSPITAL | Age: 52
Discharge: HOME | End: 2022-10-13
Admitting: PHYSICIAN ASSISTANT

## 2022-10-13 DIAGNOSIS — R10.11 RUQ PAIN: ICD-10-CM

## 2022-10-13 PROCEDURE — 76705 ECHO EXAM OF ABDOMEN: CPT

## 2022-10-18 ENCOUNTER — TREATMENT (OUTPATIENT)
Dept: PHYSICAL THERAPY | Facility: CLINIC | Age: 52
End: 2022-10-18

## 2022-10-18 DIAGNOSIS — M54.6 CHRONIC RIGHT-SIDED THORACIC BACK PAIN: Primary | ICD-10-CM

## 2022-10-18 DIAGNOSIS — G89.29 CHRONIC RIGHT-SIDED THORACIC BACK PAIN: Primary | ICD-10-CM

## 2022-10-18 PROCEDURE — 97162 PT EVAL MOD COMPLEX 30 MIN: CPT | Performed by: PHYSICAL THERAPIST

## 2022-10-18 NOTE — PROGRESS NOTES
Physical Therapy Initial Evaluation and Plan of Care  [address] [place] [address]    Patient: Alex Ghotra               : 1970  Visit Date: 10/18/2022  Referring practitioner: ROSA Tineo  Date of Initial Visit: 10/18/2022  Patient seen for 1 sessions    Visit Diagnoses:    ICD-10-CM ICD-9-CM   1. Chronic right-sided thoracic back pain  M54.6 724.1    G89.29 338.29     Past Medical History:   Diagnosis Date   • AIDS (HCC)    • Asthma    • Hep C w/ coma, chronic      Past Surgical History:   Procedure Laterality Date   • APPENDECTOMY     • COLONOSCOPY      ? ([facility]) polyps   • COLONOSCOPY N/A 2022    Procedure: COLONOSCOPY WITH ANESTHESIA;  Surgeon: Michael Israel MD;  Location: [facility] ENDOSCOPY;  Service: Gastroenterology;  Laterality: N/A;  pre hx colon polyp  post; normal  Alice Anderson MD   • ENDOSCOPY     • ENDOSCOPY N/A 2022    Procedure: ESOPHAGOGASTRODUODENOSCOPY WITH ANESTHESIA;  Surgeon: Michael Israel MD;  Location: [facility] ENDOSCOPY;  Service: Gastroenterology;  Laterality: N/A;  pre dysphagia  post candidiasis; stricture dilated  Alice Anderson MD   • MANDIBLE FRACTURE SURGERY           SUBJECTIVE     Subjective Evaluation    History of Present Illness  Mechanism of injury: No specific injury. He has pain in the thoracic area, more on the R. It can hit him and bring him to his knees.     Pain  Current pain ratin  At best pain ratin  At worst pain ratin  Quality: radiating and sharp         Outcome Measure:   FREDI= 21       OBJECTIVE     Objective          Static Posture     Thoracic Spine  Hyperkyphosis.    Lumbar Spine   Increased lordosis.     Tenderness   Cervical Spine   Tenderness in the right ribs/costal cartilage.     Additional Tenderness Details  Tenderness along R thoracic spine 8-12 and same ribs. Spring was not blocked, but tender.     Strength/Myotome Testing      Left Hip   Planes of Motion   Flexion: 5    Right Hip   Planes of Motion   Flexion: 5    Left Knee   Flexion: 5  Extension: 5    Right Knee   Flexion: 5  Extension: 5    Left Knee Flexibility Comments:   HS and calf tightness    Right Knee Flexibility Comments:   Significant HS tightness, worse than LLE.    Functional Assessment     Comments  Unable to balance on either leg more than 1-2 sec.        General Comments     Cervical/Thoracic Comments  Marked tightness across chest and upper arms. Open book rotation stretch was difficult as it pulled into his R biceps very strongly.            Therapy Education/Self Care [id number]   Education offered today Anatomy review and discussed POC   Jesuse Code [id number]   Ongoing HEP   See medbridge   Timed Minutes        Total Timed Treatment:        mins  Total Time of Visit:            45   mins    ASSESSMENT/PLAN     GOALS:   Goals             Progress Note Due 11/17/22  Recert Due  1/15/23   STG by: 6 weeks Comments Date Status   Patient will show good core activation with walking with normal breathing.       SLS either leg with stability x 10 sec      Improved thoracolumbar mobility towards more neutral alignment.            LTG by: 12 weeks      Patient will walk with more normal stride length and equal weight shift.      Patient will walk with more natural arm swing and less guarding.      Improve score on FREDI by at least 5 points.      Report an overall reduction in maximum pain level on a consistent basis.      Independent with comprehensive HEP for posture and core stability.              Assessment & Plan     Assessment  Impairments: abnormal gait, abnormal muscle firing, abnormal or restricted ROM, impaired balance, impaired physical strength, lacks appropriate home exercise program and pain with function  Functional Limitations: carrying objects, lifting, sleeping, walking, pulling, pushing, uncomfortable because of pain, moving in bed, standing and  stooping  Assessment details: Mr. Ghotra has been having significant pain along the R side of his mid-back and rib cage. 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 His thoracic spine is hyperkyphotic and he has poor sitting posture with this increased forward bent posture. He is tighter on the R side in his UE and his LE. It appears that the tightness is likely the biggest issue with the onset of pain. His MRI shows significant DJD in the thoracic spine, so that part is not new. He would benefit from PT to work on ways to improve his posture and train the muscles to maintain the upright position.   Prognosis: good    Plan  Therapy options: will be seen for skilled therapy services  Planned modality interventions: dry needling, high voltage pulsed current (pain management) and low level laser therapy  Planned therapy interventions: strengthening, manual therapy, stretching, spinal/joint mobilization, home exercise program, functional ROM exercises, soft tissue mobilization, neuromuscular re-education, postural training, balance/weight-bearing training, body mechanics training, abdominal trunk stabilization, flexibility, gait training and therapeutic activities  Frequency: 1x week  Duration in weeks: 8  Treatment plan discussed with: patient  Plan details: PT work on improving the thoracic spine positioning with appropriate stretches and progressive stabilization exercises for maintenance and HEP.         SIGNATURE: Racquel Freed, PT, DPT, [facility], [place] License #: [id number]  Electronically Signed on 10/18/2022      Initial Certification  Certification Period: 10/18/2022 through 1/15/2023  I certify that the therapy services are furnished while this patient is under my care.  The services outlined above are required by this patient, and will be reviewed every 90 days.     PHYSICIAN: Zain Leo PA (NPI:  [id number])    Signature____________________________________________DATE: _________     Please sign and return via fax to [phone].   Thank you so much for letting us work with Alex. I appreciate your letting us work with your patients. If you have any questions or concerns, please don't hesitate to contact me.          [address] [address]  [phone]

## 2022-10-19 ENCOUNTER — APPOINTMENT (OUTPATIENT)
Dept: NEUROLOGY | Facility: HOSPITAL | Age: 52
End: 2022-10-19

## 2022-10-25 ENCOUNTER — TELEPHONE (OUTPATIENT)
Dept: PHYSICAL THERAPY | Facility: CLINIC | Age: 52
End: 2022-10-25

## 2022-10-26 ENCOUNTER — HOSPITAL ENCOUNTER (OUTPATIENT)
Dept: NEUROLOGY | Facility: HOSPITAL | Age: 52
Discharge: HOME | End: 2022-10-26

## 2022-10-26 DIAGNOSIS — R20.2 PARESTHESIA: ICD-10-CM

## 2022-11-14 ENCOUNTER — HOSPITAL ENCOUNTER (EMERGENCY)
Facility: HOSPITAL | Age: 52
Discharge: LEFT BEFORE DISCHARGE | End: 2022-11-14

## 2022-11-14 PROCEDURE — 99211 OFF/OP EST MAY X REQ PHY/QHP: CPT
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
## 2022-12-01 ENCOUNTER — HOSPITAL ENCOUNTER (OUTPATIENT)
Dept: GENERAL RADIOLOGY | Facility: HOSPITAL | Age: 52
Discharge: HOME | End: 2022-12-01
Admitting: PHYSICIAN ASSISTANT

## 2022-12-01 ENCOUNTER — TRANSCRIBE ORDERS (OUTPATIENT)
Dept: ADMINISTRATIVE | Facility: HOSPITAL | Age: 52
End: 2022-12-01

## 2022-12-01 DIAGNOSIS — R52 PAIN: ICD-10-CM

## 2022-12-01 DIAGNOSIS — R52 PAIN: Primary | ICD-10-CM

## 2022-12-01 PROCEDURE — 73630 X-RAY EXAM OF FOOT: CPT

## 2023-01-19 ENCOUNTER — OFFICE VISIT (OUTPATIENT)
Dept: CARDIOLOGY | Facility: CLINIC | Age: 53
End: 2023-01-19
Payer: COMMERCIAL

## 2023-01-19 VITALS
WEIGHT: 162 LBS | BODY MASS INDEX: 25.43 KG/M2 | SYSTOLIC BLOOD PRESSURE: 110 MMHG | DIASTOLIC BLOOD PRESSURE: 72 MMHG | HEART RATE: 82 BPM | OXYGEN SATURATION: 98 % | HEIGHT: 67 IN

## 2023-01-19 DIAGNOSIS — R07.89 CHEST PAIN, ATYPICAL: Primary | ICD-10-CM

## 2023-01-19 PROCEDURE — 99213 OFFICE O/P EST LOW 20 MIN: CPT | Performed by: NURSE PRACTITIONER

## 2023-01-19 PROCEDURE — 93000 ELECTROCARDIOGRAM COMPLETE: CPT | Performed by: NURSE PRACTITIONER

## 2023-01-19 RX ORDER — SILDENAFIL 50 MG/1
50 TABLET, FILM COATED ORAL DAILY PRN
COMMUNITY

## 2023-01-19 NOTE — PROGRESS NOTES
Chief Complaint  Chest Pain (5mo F/U. )    Subjective          Alex Ghotra presents to [id number] for routine follow up. He has had chest pain in the past when sneezing. He has had hepatitis in the past and has been treated. He has HIV that is currently asymptomatic. He complains of indigestion. The patient denies shortness of breath, palpitations, dizziness, syncope, orthopnea, PND, swelling or decreased stamina. The patient denies any signs of bleeding.     Chest Pain   This is a chronic problem. The current episode started more than 1 year ago. The onset quality is sudden. The problem occurs intermittently. The problem has been unchanged. The pain is present in the epigastric region. The quality of the pain is described as sharp and stabbing. The pain does not radiate. Pertinent negatives include no abdominal pain, back pain, claudication, cough, diaphoresis, dizziness, exertional chest pressure, fever, headaches, hemoptysis, irregular heartbeat, leg pain, lower extremity edema, malaise/fatigue, nausea, near-syncope, numbness, orthopnea, palpitations, PND, shortness of breath, sputum production, syncope, vomiting or weakness. Associated with: sneezing. He has tried nothing for the symptoms. Risk factors include male gender and substance abuse. Prior diagnostic workup includes stress echo and echocardiogram.       Objective     Current Outpatient Medications:   •  albuterol sulfate  (90 Base) MCG/ACT inhaler, Inhale 2 puffs Every 4 (Four) Hours As Needed for Wheezing., Disp: 1 g, Rfl: 0  •  Cholecalciferol (Vitamin D3) 50 MCG (2000 UT) capsule, TAKE 1 CAPSULE BY MOUTH DAILY WITH A MEAL, Disp: , Rfl:   •  darunavir ethanolate (PREZISTA) 800 MG tablet tablet, Take 800 mg by mouth., Disp: , Rfl:   •  Darunavir-Cobicistat (Prezcobix) 800-150 MG per tablet, Take  by mouth Daily., Disp: , Rfl:   •  Diclofenac Sodium (VOLTAREN) 1 % gel gel, Apply 4 g topically to the  "appropriate area as directed 3 (Three) Times a Day., Disp: 150 g, Rfl: 0  •  dolutegravir (Tivicay) 50 MG tablet, Take 50 mg by mouth Daily., Disp: , Rfl:   •  Emtricitabine-Tenofovir AF (Descovy) 200-25 MG per tablet, Take  by mouth Daily., Disp: , Rfl:   •  etravirine (INTELENCE) 100 MG tablet, Take  by mouth., Disp: , Rfl:   •  fluticasone (FLONASE) 50 MCG/ACT nasal spray, 2 sprays into the nostril(s) as directed by provider Daily., Disp: 16 g, Rfl: 5  •  ibuprofen (ADVIL,MOTRIN) 800 MG tablet, Take 800 mg by mouth Daily As Needed., Disp: , Rfl:   •  miSOPROStol (CYTOTEC) 200 MCG tablet, Take 200 mcg by mouth 4 (Four) Times a Day., Disp: , Rfl:   •  multivitamin with minerals tablet tablet, Take 1 tablet by mouth Daily., Disp: , Rfl:   •  Nutritional Supplements (GUNNAR PO), Take  by mouth Daily., Disp: , Rfl:   •  rilpivirine (EDURANT) 25 MG tablet tablet, Take 25 mg by mouth Daily., Disp: , Rfl:   •  sildenafil (VIAGRA) 50 MG tablet, Take 50 mg by mouth Daily As Needed for Erectile Dysfunction., Disp: , Rfl:   Vital Signs:   /72   Pulse 82   Ht 170.2 cm (67\")   Wt 73.5 kg (162 lb)   SpO2 98%   BMI 25.37 kg/m²     Vitals and nursing note reviewed.   Constitutional:       General: Awake.      Appearance: Normal and healthy appearance. Well-developed, normal weight and not in distress.   Eyes:      General: Lids are normal.      Conjunctiva/sclera: Conjunctivae normal.      Pupils: Pupils are equal, round, and reactive to light.   HENT:      Head: Normocephalic and atraumatic.      Nose: Nose normal.   Neck:      Vascular: No JVR. JVD normal.   Pulmonary:      Effort: Pulmonary effort is normal.      Breath sounds: Normal breath sounds. No wheezing. No rhonchi. No rales.   Chest:      Chest wall: Not tender to palpatation.   Cardiovascular:      PMI at left midclavicular line. Normal rate. Regular rhythm. Normal S1. Normal S2.      Murmurs: There is no murmur.      No gallop. No click. No rub. "   Pulses:     Intact distal pulses.   Edema:     Peripheral edema absent.   Abdominal:      General: Bowel sounds are normal.      Palpations: Abdomen is soft.      Tenderness: There is no abdominal tenderness.   Musculoskeletal: Normal range of motion.         General: No tenderness.      Cervical back: Normal range of motion. Skin:     General: Skin is warm and dry.   Neurological:      General: No focal deficit present.      Mental Status: Alert, oriented to person, place, and time and oriented to person, place and time.   Psychiatric:         Attention and Perception: Attention and perception normal.         Mood and Affect: Mood and affect normal.         Speech: Speech normal.         Behavior: Behavior normal. Behavior is cooperative.         Thought Content: Thought content normal.         Cognition and Memory: Cognition and memory normal.         Judgment: Judgment normal.        Result Review :   The following data was reviewed by: ARACELI Chand on 01/19/2023:  Common labs    Common Labs 3/25/22 3/25/22 9/19/22 9/19/22    2327 [id number] 0708 0708   Glucose  105 (A)  126 (A)   BUN  12  22 (A)   Creatinine  1.21  0.87   Sodium  138  136   Potassium  4.0  4.4   Chloride  105  102   Calcium  9.1  9.3   Albumin  4.20     Total Bilirubin  0.3     Alkaline Phosphatase  80     AST (SGOT)  40     ALT (SGPT)  52 (A)     WBC 8.22  14.84 (A)    Hemoglobin 15.1  16.2    Hematocrit 45.0  49.5    Platelets 214  258    (A) Abnormal value       Comments are available for some flowsheets but are not being displayed.           Data reviewed: Cardiology studies stress echo 5/24/22 and 2d echo 6/29/22     ECG 12 Lead    Date/Time: 1/19/2023 3:32 PM  Performed by: Liz Little APRN  Authorized by: Liz Little APRN   Comparison: compared with previous ECG from 3/26/2022  Similar to previous ECG  Rhythm: sinus rhythm  Rate: normal  BPM: 82  QRS axis: normal    Clinical impression: normal ECG               Assessment and Plan    Diagnoses and all orders for this visit:    1. Chest pain, atypical (Primary)- normal stress echo 5/24/22. Uncharacteristic of ischemic type pain. Report worsening symptoms. Recommend follow up with GI.     Other orders  -     ECG 12 Lead        Follow Up   Return in about 6 months (around 7/19/2023) for Next scheduled follow up.  Patient was given instructions and counseling regarding his condition or for health maintenance advice. Please see specific information pulled into the AVS if appropriate.

## 2023-02-01 ENCOUNTER — HOSPITAL ENCOUNTER (EMERGENCY)
Facility: HOSPITAL | Age: 53
Discharge: HOME | End: 2023-02-01
Attending: EMERGENCY MEDICINE | Admitting: EMERGENCY MEDICINE
Payer: COMMERCIAL

## 2023-02-01 ENCOUNTER — APPOINTMENT (OUTPATIENT)
Dept: CT IMAGING | Facility: HOSPITAL | Age: 53
End: 2023-02-01
Payer: COMMERCIAL

## 2023-02-01 VITALS
DIASTOLIC BLOOD PRESSURE: 78 MMHG | HEART RATE: 90 BPM | TEMPERATURE: 97.9 F | SYSTOLIC BLOOD PRESSURE: 132 MMHG | HEIGHT: 67 IN | BODY MASS INDEX: 25.58 KG/M2 | OXYGEN SATURATION: 98 % | RESPIRATION RATE: 16 BRPM | WEIGHT: 163 LBS

## 2023-02-01 DIAGNOSIS — R10.13 EPIGASTRIC ABDOMINAL PAIN: Primary | ICD-10-CM

## 2023-02-01 DIAGNOSIS — K76.0 FATTY LIVER: ICD-10-CM

## 2023-02-01 LAB
ALBUMIN SERPL-MCNC: 4.6 G/DL (ref 3.5–5.2)
ALBUMIN/GLOB SERPL: 1.3 G/DL
ALP SERPL-CCNC: 59 U/L (ref 39–117)
ALT SERPL W P-5'-P-CCNC: 16 U/L (ref 1–41)
ANION GAP SERPL CALCULATED.3IONS-SCNC: 9 MMOL/L (ref 5–15)
AST SERPL-CCNC: 20 U/L (ref 1–40)
BASOPHILS # BLD AUTO: 0.1 10*3/MM3 (ref 0–0.2)
BASOPHILS NFR BLD AUTO: 1.2 % (ref 0–1.5)
BILIRUB SERPL-MCNC: 0.3 MG/DL (ref 0–1.2)
BILIRUB UR QL STRIP: NEGATIVE
BUN SERPL-MCNC: 13 MG/DL (ref 6–20)
BUN/CREAT SERPL: 17.1 (ref 7–25)
CALCIUM SPEC-SCNC: 9.4 MG/DL (ref 8.6–10.5)
CHLORIDE SERPL-SCNC: 103 MMOL/L (ref 98–107)
CLARITY UR: CLEAR
CO2 SERPL-SCNC: 25 MMOL/L (ref 22–29)
COLOR UR: YELLOW
CREAT SERPL-MCNC: 0.76 MG/DL (ref 0.76–1.27)
D-LACTATE SERPL-SCNC: 1.2 MMOL/L (ref 0.5–2)
DEPRECATED RDW RBC AUTO: 42.2 FL (ref 37–54)
EGFRCR SERPLBLD CKD-EPI 2021: 107.5 ML/MIN/1.73
EOSINOPHIL # BLD AUTO: 0.24 10*3/MM3 (ref 0–0.4)
EOSINOPHIL NFR BLD AUTO: 2.9 % (ref 0.3–6.2)
ERYTHROCYTE [DISTWIDTH] IN BLOOD BY AUTOMATED COUNT: 12.3 % (ref 12.3–15.4)
GLOBULIN UR ELPH-MCNC: 3.5 GM/DL
GLUCOSE SERPL-MCNC: 98 MG/DL (ref 65–99)
GLUCOSE UR STRIP-MCNC: NEGATIVE MG/DL
HCT VFR BLD AUTO: 48 % (ref 37.5–51)
HGB BLD-MCNC: 16.4 G/DL (ref 13–17.7)
HGB UR QL STRIP.AUTO: NEGATIVE
HOLD SPECIMEN: NORMAL
HOLD SPECIMEN: NORMAL
IMM GRANULOCYTES # BLD AUTO: 0.05 10*3/MM3 (ref 0–0.05)
IMM GRANULOCYTES NFR BLD AUTO: 0.6 % (ref 0–0.5)
KETONES UR QL STRIP: NEGATIVE
LEUKOCYTE ESTERASE UR QL STRIP.AUTO: NEGATIVE
LIPASE SERPL-CCNC: 96 U/L (ref 13–60)
LYMPHOCYTES # BLD AUTO: 3.09 10*3/MM3 (ref 0.7–3.1)
LYMPHOCYTES NFR BLD AUTO: 37.2 % (ref 19.6–45.3)
MCH RBC QN AUTO: 31.5 PG (ref 26.6–33)
MCHC RBC AUTO-ENTMCNC: 34.2 G/DL (ref 31.5–35.7)
MCV RBC AUTO: 92.3 FL (ref 79–97)
MONOCYTES # BLD AUTO: 0.86 10*3/MM3 (ref 0.1–0.9)
MONOCYTES NFR BLD AUTO: 10.3 % (ref 5–12)
NEUTROPHILS NFR BLD AUTO: 3.97 10*3/MM3 (ref 1.7–7)
NEUTROPHILS NFR BLD AUTO: 47.8 % (ref 42.7–76)
NITRITE UR QL STRIP: NEGATIVE
NRBC BLD AUTO-RTO: 0 /100 WBC (ref 0–0.2)
PH UR STRIP.AUTO: <=5 [PH] (ref 5–8)
PLATELET # BLD AUTO: 249 10*3/MM3 (ref 140–450)
PMV BLD AUTO: 9.1 FL (ref 6–12)
POTASSIUM SERPL-SCNC: 4.1 MMOL/L (ref 3.5–5.2)
PROT SERPL-MCNC: 8.1 G/DL (ref 6–8.5)
PROT UR QL STRIP: NEGATIVE
QT INTERVAL: 402 MS
QTC INTERVAL: 440 MS
RBC # BLD AUTO: 5.2 10*6/MM3 (ref 4.14–5.8)
SODIUM SERPL-SCNC: 137 MMOL/L (ref 136–145)
SP GR UR STRIP: 1.01 (ref 1–1.03)
UROBILINOGEN UR QL STRIP: NORMAL
WBC NRBC COR # BLD: 8.31 10*3/MM3 (ref 3.4–10.8)
WHOLE BLOOD HOLD COAG: NORMAL
WHOLE BLOOD HOLD SPECIMEN: NORMAL

## 2023-02-01 PROCEDURE — 80053 COMPREHEN METABOLIC PANEL: CPT | Performed by: EMERGENCY MEDICINE

## 2023-02-01 PROCEDURE — 25010000002 ONDANSETRON PER 1 MG: Performed by: EMERGENCY MEDICINE

## 2023-02-01 PROCEDURE — 93005 ELECTROCARDIOGRAM TRACING: CPT | Performed by: EMERGENCY MEDICINE

## 2023-02-01 PROCEDURE — 25010000002 IOPAMIDOL 61 % SOLUTION: Performed by: EMERGENCY MEDICINE

## 2023-02-01 PROCEDURE — 96375 TX/PRO/DX INJ NEW DRUG ADDON: CPT

## 2023-02-01 PROCEDURE — 96374 THER/PROPH/DIAG INJ IV PUSH: CPT

## 2023-02-01 PROCEDURE — 83690 ASSAY OF LIPASE: CPT | Performed by: EMERGENCY MEDICINE

## 2023-02-01 PROCEDURE — 85025 COMPLETE CBC W/AUTO DIFF WBC: CPT | Performed by: EMERGENCY MEDICINE

## 2023-02-01 PROCEDURE — 25010000002 KETOROLAC TROMETHAMINE PER 15 MG: Performed by: EMERGENCY MEDICINE

## 2023-02-01 PROCEDURE — 83605 ASSAY OF LACTIC ACID: CPT | Performed by: EMERGENCY MEDICINE

## 2023-02-01 PROCEDURE — 99283 EMERGENCY DEPT VISIT LOW MDM: CPT

## 2023-02-01 PROCEDURE — 93010 ELECTROCARDIOGRAM REPORT: CPT | Performed by: INTERNAL MEDICINE

## 2023-02-01 PROCEDURE — 74177 CT ABD & PELVIS W/CONTRAST: CPT

## 2023-02-01 PROCEDURE — 81003 URINALYSIS AUTO W/O SCOPE: CPT | Performed by: EMERGENCY MEDICINE

## 2023-02-01 RX ORDER — ONDANSETRON 4 MG/1
4 TABLET, ORALLY DISINTEGRATING ORAL EVERY 8 HOURS PRN
Qty: 12 TABLET | Refills: 0 | Status: SHIPPED | OUTPATIENT
Start: 2023-02-01

## 2023-02-01 RX ORDER — SODIUM CHLORIDE 0.9 % (FLUSH) 0.9 %
10 SYRINGE (ML) INJECTION AS NEEDED
Status: DISCONTINUED | OUTPATIENT
Start: 2023-02-01 | End: 2023-02-01 | Stop reason: HOSPADM

## 2023-02-01 RX ORDER — KETOROLAC TROMETHAMINE 30 MG/ML
30 INJECTION, SOLUTION INTRAMUSCULAR; INTRAVENOUS ONCE
Status: COMPLETED | OUTPATIENT
Start: 2023-02-01 | End: 2023-02-01

## 2023-02-01 RX ORDER — TIOTROPIUM BROMIDE 18 UG/1
CAPSULE ORAL; RESPIRATORY (INHALATION) DAILY
COMMUNITY
Start: 2022-12-19

## 2023-02-01 RX ORDER — DICYCLOMINE HYDROCHLORIDE 10 MG/1
10 CAPSULE ORAL
Qty: 12 CAPSULE | Refills: 0 | Status: SHIPPED | OUTPATIENT
Start: 2023-02-01

## 2023-02-01 RX ORDER — FENOFIBRATE 160 MG/1
160 TABLET ORAL DAILY
COMMUNITY

## 2023-02-01 RX ORDER — ONDANSETRON 2 MG/ML
4 INJECTION INTRAMUSCULAR; INTRAVENOUS ONCE
Status: COMPLETED | OUTPATIENT
Start: 2023-02-01 | End: 2023-02-01

## 2023-02-01 RX ORDER — ESOMEPRAZOLE MAGNESIUM 40 MG/1
40 CAPSULE, DELAYED RELEASE ORAL
Qty: 30 CAPSULE | Refills: 0 | Status: SHIPPED | OUTPATIENT
Start: 2023-02-01

## 2023-02-01 RX ADMIN — ONDANSETRON 4 MG: 2 INJECTION INTRAMUSCULAR; INTRAVENOUS at 07:28

## 2023-02-01 RX ADMIN — SODIUM CHLORIDE 1000 ML: 9 INJECTION, SOLUTION INTRAVENOUS at 07:29

## 2023-02-01 RX ADMIN — IOPAMIDOL 100 ML: 612 INJECTION, SOLUTION INTRAVENOUS at 08:05

## 2023-02-01 RX ADMIN — KETOROLAC TROMETHAMINE 30 MG: 30 INJECTION, SOLUTION INTRAMUSCULAR; INTRAVENOUS at 07:29

## 2023-02-01 NOTE — ED PROVIDER NOTES
Subjective   History of Present Illness  Patient is a 52-year-old male with a history of HIV and hepatitis C who presents to the ER with abdominal pain.  Patient states he has had sharp epigastric abdominal pain since yesterday, progressively worsening.  He has had associated nausea.  Pain radiates to his mid back.  He denies any fever, chest pain, shortness of air, vomiting, diarrhea, urinary changes, neurologic changes.  Patient was told that he has very high cholesterol and triglycerides and was started on a new medication yesterday.         Review of Systems   Constitutional: Negative.    HENT: Negative.    Eyes: Negative.    Respiratory: Negative.    Cardiovascular: Negative.    Gastrointestinal: Positive for abdominal pain and nausea.   Endocrine: Negative.    Genitourinary: Negative.    Musculoskeletal: Positive for back pain.   Skin: Negative.    Allergic/Immunologic: Negative.    Neurological: Negative.    Hematological: Negative.    Psychiatric/Behavioral: Negative.    All other systems reviewed and are negative.      Past Medical History:   Diagnosis Date   • AIDS (HCC)    • Asthma    • Hep C w/ coma, chronic        Allergies   Allergen Reactions   • Amoxicillin Other (See Comments)     unknown   • Bactrim [Sulfamethoxazole-Trimethoprim]    • Other      SOME HIV MEDICATION, DONT KNOW NAME    • Remeron [Mirtazapine] Other (See Comments)     unknown   • Ziagen [Abacavir] Other (See Comments)     unknown       Past Surgical History:   Procedure Laterality Date   • APPENDECTOMY     • COLONOSCOPY      2013? ([facility]) polyps   • COLONOSCOPY N/A 06/09/2022    Procedure: COLONOSCOPY WITH ANESTHESIA;  Surgeon: Michael Israel MD;  Location:  [facility] ENDOSCOPY;  Service: Gastroenterology;  Laterality: N/A;  pre hx colon polyp  post; normal  Alice Anderson MD   • ENDOSCOPY     • ENDOSCOPY N/A 06/09/2022    Procedure: ESOPHAGOGASTRODUODENOSCOPY WITH ANESTHESIA;  Surgeon: Michael Israel MD;  Location:   [facility]OSCOPY;  Service: Gastroenterology;  Laterality: N/A;  pre dysphagia  post candidiasis; stricture dilated  Alice Anderson MD   • MANDIBLE FRACTURE SURGERY         Family History   Problem Relation Age of Onset   • Heart attack Mother    • Diabetes Mother    • Heart attack Father    • Liver cancer Father    • Colon cancer Neg Hx    • Colon polyps Neg Hx        Social History     Socioeconomic History   • Marital status:    Tobacco Use   • Smoking status: Every Day     Packs/day: 1.00     Types: Cigarettes   • Smokeless tobacco: Never   Vaping Use   • Vaping Use: Never used   Substance and Sexual Activity   • Alcohol use: No   • Drug use: Not Currently     Types: Methamphetamines   • Sexual activity: Defer           Objective   Physical Exam  Vitals and nursing note reviewed.   Constitutional:       Appearance: He is well-developed.   HENT:      Head: Normocephalic and atraumatic.   Eyes:      Conjunctiva/sclera: Conjunctivae normal.      Pupils: Pupils are equal, round, and reactive to light.   Cardiovascular:      Rate and Rhythm: Normal rate and regular rhythm.      Heart sounds: Normal heart sounds.   Pulmonary:      Effort: Pulmonary effort is normal.      Breath sounds: Normal breath sounds.   Abdominal:      Palpations: Abdomen is soft.      Tenderness: There is abdominal tenderness in the right upper quadrant, epigastric area and left upper quadrant. There is no right CVA tenderness, left CVA tenderness, guarding or rebound.   Musculoskeletal:         General: No deformity. Normal range of motion.      Cervical back: Normal range of motion.   Skin:     General: Skin is warm.   Neurological:      Mental Status: He is alert and oriented to person, place, and time.   Psychiatric:         Behavior: Behavior normal.         Procedures           ED Course      EKG: Normal sinus rhythm with a rate of 72, no acute ischemia or infarction         Lab Results (last 24 hours)     Procedure Component  Value Units Date/Time    Lactic Acid, Plasma [[id number]]  (Normal) Collected: 02/01/23 0712    Specimen: Blood Updated: 02/01/23 0733     Lactate 1.2 mmol/L     CBC & Differential [[id number]]  (Abnormal) Collected: 02/01/23 0712    Specimen: Blood Updated: 02/01/23 0724    Narrative:      The following orders were created for panel order CBC & Differential.  Procedure                               Abnormality         Status                     ---------                               -----------         ------                     CBC Auto Differential[[id number]]        Abnormal            Final result                 Please view results for these tests on the individual orders.    Comprehensive Metabolic Panel [[id number]] Collected: 02/01/23 0712    Specimen: Blood Updated: 02/01/23 0737     Glucose 98 mg/dL      BUN 13 mg/dL      Creatinine 0.76 mg/dL      Sodium 137 mmol/L      Potassium 4.1 mmol/L      Comment: Slight hemolysis detected by analyzer. Results may be affected.        Chloride 103 mmol/L      CO2 25.0 mmol/L      Calcium 9.4 mg/dL      Total Protein 8.1 g/dL      Albumin 4.6 g/dL      ALT (SGPT) 16 U/L      AST (SGOT) 20 U/L      Alkaline Phosphatase 59 U/L      Total Bilirubin 0.3 mg/dL      Globulin 3.5 gm/dL      A/G Ratio 1.3 g/dL      BUN/Creatinine Ratio 17.1     Anion Gap 9.0 mmol/L      eGFR 107.5 mL/min/1.73     Narrative:      GFR Normal >60  Chronic Kidney Disease <60  Kidney Failure <15      Lipase [[id number]]  (Abnormal) Collected: 02/01/23 0712    Specimen: Blood Updated: 02/01/23 0732     Lipase 96 U/L     CBC Auto Differential [[id number]]  (Abnormal) Collected: 02/01/23 0712    Specimen: Blood Updated: 02/01/23 0724     WBC 8.31 10*3/mm3      RBC 5.20 10*6/mm3      Hemoglobin 16.4 g/dL      Hematocrit 48.0 %      MCV 92.3 fL      MCH 31.5 pg      MCHC 34.2 g/dL      RDW 12.3 %      RDW-SD 42.2 fl      MPV 9.1 fL      Platelets 249 10*3/mm3      Neutrophil % 47.8 %      Lymphocyte %  37.2 %      Monocyte % 10.3 %      Eosinophil % 2.9 %      Basophil % 1.2 %      Immature Grans % 0.6 %      Neutrophils, Absolute 3.97 10*3/mm3      Lymphocytes, Absolute 3.09 10*3/mm3      Monocytes, Absolute 0.86 10*3/mm3      Eosinophils, Absolute 0.24 10*3/mm3      Basophils, Absolute 0.10 10*3/mm3      Immature Grans, Absolute 0.05 10*3/mm3      nRBC 0.0 /100 WBC     Urinalysis With Culture If Indicated - Urine, Clean Catch [[id number]]  (Normal) Collected: 02/01/23 0754    Specimen: Urine, Clean Catch Updated: 02/01/23 0802     Color, UA Yellow     Appearance, UA Clear     pH, UA <=5.0     Specific Gravity, UA 1.015     Glucose, UA Negative     Ketones, UA Negative     Bilirubin, UA Negative     Blood, UA Negative     Protein, UA Negative     Leuk Esterase, UA Negative     Nitrite, UA Negative     Urobilinogen, UA 0.2 E.U./dL    Narrative:      In absence of clinical symptoms, the presence of pyuria, bacteria, and/or nitrites on the urinalysis result does not correlate with infection.  Urine microscopic not indicated.        CT Abdomen Pelvis With Contrast   Final Result   1. No acute abnormality of the abdomen or pelvis, particularly, no   evidence of pancreatitis.   2. Fatty infiltration of the liver.                       This report was finalized on 02/01/2023 08:36 by Dr. Destini Giron MD.                                      Medical Decision Making  Patient is a 52-year-old male with a history of HIV and hepatitis C who presents to the ER with abdominal pain.  Patient states he has had sharp epigastric abdominal pain since yesterday, progressively worsening.  He has had associated nausea.  Pain radiates to his mid back.  He denies any fever, chest pain, shortness of air, vomiting, diarrhea, urinary changes, neurologic changes.  Patient was told that he has very high cholesterol and triglycerides and was started on a new medication yesterday.     Differential diagnosis: Pancreatitis, gastritis, peptic  ulcer disease, cholecystitis, acute coronary syndrome    Patient was given IV fluids, Toradol and Zofran.  Patient refused any narcotics.  Labs showed only a minimally elevated lipase but were otherwise negative.  CT scan abdomen pelvis showed no acute findings.  Patient did have fatty infiltration of the liver.  Patient symptoms resolved with treatment.  He was feeling significantly better and requesting to be discharged home.  This could be gastritis versus peptic ulcer disease but could be very early pancreatitis.  Patient will be discharged home with Nexium, Bentyl and Zofran to follow-up with his PCP.  He is return for any worsening or new pain, fever, vomiting or other concerns.  Patient agreeable.    Epigastric abdominal pain: acute illness or injury  Fatty liver: chronic illness or injury  Amount and/or Complexity of Data Reviewed  Labs: ordered. Decision-making details documented in ED Course.  Radiology: ordered. Decision-making details documented in ED Course.  ECG/medicine tests: ordered. Decision-making details documented in ED Course.      Risk  Prescription drug management.          Final diagnoses:   Epigastric abdominal pain   Fatty liver       ED Disposition  ED Disposition     ED Disposition   Discharge    Condition   Good    Comment   --             Alice Anderson MD  [address] [place] [address]  Infectious Disease  [facility] [address]  [phone]    Schedule an appointment as soon as possible for a visit            Medication List      New Prescriptions    dicyclomine 10 MG capsule  Commonly known as: BENTYL  Take 1 capsule by mouth 4 (Four) Times a Day Before Meals & at Bedtime.     esomeprazole 40 MG capsule  Commonly known as: nexIUM  Take 1 capsule by mouth Every Morning Before Breakfast.     ondansetron ODT 4 MG disintegrating tablet  Commonly known as: ZOFRAN-ODT  Place 1 tablet on the tongue Every 8 (Eight) Hours As Needed for Nausea or Vomiting.           Where to Get Your Medications       These medications were sent to [facility] - [place] - [address] - [phone]  - [phone]   [address] [place] [address]    Phone: [phone]   · dicyclomine 10 MG capsule  · esomeprazole 40 MG capsule  · ondansetron ODT 4 MG disintegrating tablet          Carin Koch MD  02/01/23 0902

## 2023-02-02 ENCOUNTER — HOSPITAL ENCOUNTER (EMERGENCY)
Facility: HOSPITAL | Age: 53
Discharge: LEFT BEFORE DISCHARGE | End: 2023-02-02
Admitting: EMERGENCY MEDICINE
Payer: COMMERCIAL

## 2023-02-02 ENCOUNTER — APPOINTMENT (OUTPATIENT)
Dept: ULTRASOUND IMAGING | Facility: HOSPITAL | Age: 53
End: 2023-02-02
Payer: COMMERCIAL

## 2023-02-02 ENCOUNTER — APPOINTMENT (OUTPATIENT)
Dept: GENERAL RADIOLOGY | Facility: HOSPITAL | Age: 53
End: 2023-02-02
Payer: COMMERCIAL

## 2023-02-02 VITALS
BODY MASS INDEX: 25.58 KG/M2 | TEMPERATURE: 97.8 F | RESPIRATION RATE: 20 BRPM | OXYGEN SATURATION: 97 % | WEIGHT: 163 LBS | SYSTOLIC BLOOD PRESSURE: 130 MMHG | DIASTOLIC BLOOD PRESSURE: 81 MMHG | HEIGHT: 67 IN | HEART RATE: 76 BPM

## 2023-02-02 LAB
DEVELOPER EXPIRATION DATE: NORMAL
DEVELOPER LOT NUMBER: 225
EXPIRATION DATE: NORMAL
FECAL OCCULT BLOOD SCREEN, POC: NEGATIVE
Lab: 225
NEGATIVE CONTROL: NEGATIVE
POSITIVE CONTROL: POSITIVE

## 2023-02-02 PROCEDURE — 74018 RADEX ABDOMEN 1 VIEW: CPT

## 2023-02-02 PROCEDURE — 99282 EMERGENCY DEPT VISIT SF MDM: CPT

## 2023-02-02 PROCEDURE — 82270 OCCULT BLOOD FECES: CPT | Performed by: EMERGENCY MEDICINE

## 2023-02-02 RX ORDER — FAMOTIDINE 10 MG/ML
20 INJECTION, SOLUTION INTRAVENOUS ONCE
Status: DISCONTINUED | OUTPATIENT
Start: 2023-02-02 | End: 2023-02-02

## 2023-02-02 RX ORDER — SODIUM CHLORIDE 0.9 % (FLUSH) 0.9 %
10 SYRINGE (ML) INJECTION AS NEEDED
Status: DISCONTINUED | OUTPATIENT
Start: 2023-02-02 | End: 2023-02-02

## 2023-02-02 NOTE — ED NOTES
Pt leaving AMA and will go to PCP and get testing. Pt states he only came because he thought he had blood in his stool. Pt encouraged to return if he needs to.

## 2023-02-02 NOTE — ED PROVIDER NOTES
Subjective   History of Present Illness  Patient is a 52-year-old white male presents emergency department with midepigastric abdominal pain for the past 3 days.  He has had some nausea without vomiting.  He was seen here in the emergency department yesterday with same complaints.  Had a CAT scan of the abdomen pelvis which was negative for any acute findings and just indicated a fatty liver.  Laboratory studies were unremarkable.  He received Toradol and Zofran while in the emergency department and was sent home with Bentyl and Zofran.  He states he continued to have pain last night and took some Pepto-Bismol.  He states today he noticed dark stool and he was afraid that blood was in the stool.  He continues to have pain today.  No nausea today.  No diarrhea.  No fever or chills.  Patient has a history of HIV and hepatitis from previous IV drug use.    History provided by:  Patient   used: No        Review of Systems   Constitutional: Negative.    HENT: Negative.    Eyes: Negative.    Respiratory: Negative.    Cardiovascular: Negative.    Gastrointestinal: Positive for abdominal distention and abdominal pain.   Endocrine: Negative.    Genitourinary: Negative.    Musculoskeletal: Negative.    Skin: Negative.    Allergic/Immunologic: Negative.    Neurological: Negative.    Hematological: Negative.    Psychiatric/Behavioral: Negative.    All other systems reviewed and are negative.      Past Medical History:   Diagnosis Date   • AIDS (HCC)    • Asthma    • Hep C w/ coma, chronic        Allergies   Allergen Reactions   • Amoxicillin Other (See Comments)     unknown   • Bactrim [Sulfamethoxazole-Trimethoprim]    • Other      SOME HIV MEDICATION, DONT KNOW NAME    • Remeron [Mirtazapine] Other (See Comments)     unknown   • Ziagen [Abacavir] Other (See Comments)     unknown       Past Surgical History:   Procedure Laterality Date   • APPENDECTOMY     • COLONOSCOPY      2013? ([facility]) polyps   •  COLONOSCOPY N/A 06/09/2022    Procedure: COLONOSCOPY WITH ANESTHESIA;  Surgeon: Michael Israel MD;  Location:  [facility];  Service: Gastroenterology;  Laterality: N/A;  pre hx colon polyp  post; normal  Alice Anderson MD   • ENDOSCOPY     • ENDOSCOPY N/A 06/09/2022    Procedure: ESOPHAGOGASTRODUODENOSCOPY WITH ANESTHESIA;  Surgeon: Michael Israel MD;  Location:  [facility];  Service: Gastroenterology;  Laterality: N/A;  pre dysphagia  post candidiasis; stricture dilated  Alice Anderson MD   • MANDIBLE FRACTURE SURGERY         Family History   Problem Relation Age of Onset   • Heart attack Mother    • Diabetes Mother    • Heart attack Father    • Liver cancer Father    • Colon cancer Neg Hx    • Colon polyps Neg Hx        Social History     Socioeconomic History   • Marital status:    Tobacco Use   • Smoking status: Every Day     Packs/day: 1.00     Types: Cigarettes   • Smokeless tobacco: Never   Vaping Use   • Vaping Use: Never used   Substance and Sexual Activity   • Alcohol use: No   • Drug use: Not Currently     Types: Methamphetamines   • Sexual activity: Defer       Prior to Admission medications    Medication Sig Start Date End Date Taking? Authorizing Provider   albuterol sulfate  (90 Base) MCG/ACT inhaler Inhale 2 puffs Every 4 (Four) Hours As Needed for Wheezing. 9/19/22   James Platt MD   Cholecalciferol (Vitamin D3) 50 MCG (2000 UT) capsule TAKE 1 CAPSULE BY MOUTH DAILY WITH A MEAL 5/2/22   Emergency, Nurse Murray RN   darunavir ethanolate (PREZISTA) 800 MG tablet tablet Take 800 mg by mouth.    Emergency, Nurse Murray RN   Darunavir-Cobicistat (Prezcobix) 800-150 MG per tablet Take  by mouth Daily.    Provider, MD Rudy   Diclofenac Sodium (VOLTAREN) 1 % gel gel Apply 4 g topically to the appropriate area as directed 3 (Three) Times a Day. 9/19/22   James Platt MD   dicyclomine (BENTYL) 10 MG capsule Take 1 capsule by mouth 4 (Four) Times a Day  "Before Meals & at Bedtime. 2/1/23   Carin Koch MD   dolutegravir (Tivicay) 50 MG tablet Take 50 mg by mouth Daily.    Rudy Rendon MD   Emtricitabine-Tenofovir AF (Descovy) 200-25 MG per tablet Take  by mouth Daily.    Rudy Rendon MD   esomeprazole (nexIUM) 40 MG capsule Take 1 capsule by mouth Every Morning Before Breakfast. 2/1/23   Carin Koch MD   etravirine (INTELENCE) 100 MG tablet Take  by mouth.    Emergency, Nurse Murray RN   fenofibrate 160 MG tablet Take 160 mg by mouth Daily.    Rudy Rendon MD   fluticasone (FLONASE) 50 MCG/ACT nasal spray 2 sprays into the nostril(s) as directed by provider Daily. 10/12/22   Amira Domingo APRN   ibuprofen (ADVIL,MOTRIN) 800 MG tablet Take 800 mg by mouth Daily As Needed. 4/6/22   Emergency, Nurse Epic, RN   miSOPROStol (CYTOTEC) 200 MCG tablet Take 200 mcg by mouth 4 (Four) Times a Day. 6/27/22   Rudy Rendon MD   multivitamin with minerals tablet tablet Take 1 tablet by mouth Daily.    Rudy Rendon MD   Nutritional Supplements (GUNNAR PO) Take  by mouth Daily.    Rudy Rendon MD   ondansetron ODT (ZOFRAN-ODT) 4 MG disintegrating tablet Place 1 tablet on the tongue Every 8 (Eight) Hours As Needed for Nausea or Vomiting. 2/1/23   Carin Koch MD   rilpivirine (EDURANT) 25 MG tablet tablet Take 25 mg by mouth Daily.    Rudy Rendon MD   sildenafil (VIAGRA) 50 MG tablet Take 50 mg by mouth Daily As Needed for Erectile Dysfunction.    Rudy Rendon MD   Spiriva HandiHaler 18 MCG per inhalation capsule Place  into inhaler and inhale Daily. 12/19/22   Rudy Rendon MD       /81   Pulse 76   Temp 97.8 °F (36.6 °C)   Resp 20   Ht 170.2 cm (67\")   Wt 73.9 kg (163 lb)   SpO2 97%   BMI 25.53 kg/m²     Objective   Physical Exam  Vitals and nursing note reviewed.   Constitutional:       Appearance: He is well-developed.      Comments: Chronically ill appearing. No acute " distress. Non toxic appearing    HENT:      Head: Normocephalic and atraumatic.   Eyes:      Conjunctiva/sclera: Conjunctivae normal.      Pupils: Pupils are equal, round, and reactive to light.   Cardiovascular:      Rate and Rhythm: Normal rate and regular rhythm.      Heart sounds: Normal heart sounds.   Pulmonary:      Effort: Pulmonary effort is normal.      Breath sounds: Normal breath sounds.   Abdominal:      General: Bowel sounds are normal.      Palpations: Abdomen is soft.      Comments: abd soft, sl distended. Tenderness to midepigastic area. No guarding or rebound noted.    Genitourinary:     Rectum: Normal. Guaiac result negative. No mass or tenderness.   Musculoskeletal:         General: Normal range of motion.      Cervical back: Normal range of motion and neck supple.   Skin:     General: Skin is warm and dry.   Neurological:      Mental Status: He is alert and oriented to person, place, and time.      Deep Tendon Reflexes: Reflexes are normal and symmetric.   Psychiatric:         Behavior: Behavior normal.         Thought Content: Thought content normal.         Judgment: Judgment normal.         Procedures         Lab Results (last 24 hours)     Procedure Component Value Units Date/Time    POC Occult Blood Stool [[id number]]  (Normal) Collected: 02/02/23 0955    Specimen: Stool Updated: 02/02/23 0956     Fecal Occult Blood Negative     Lot Number [id number]     Expiration Date 03/31/2023     DEVELOPER LOT NUMBER [id number]     DEVELOPER EXPIRATION DATE 03/31/2023     Positive Control Positive     Negative Control Negative          XR Abdomen KUB   Final Result   1. Moderate fecal stasis. No radiographic evidence of bowel obstruction.   No pathologic calcifications.   This report was finalized on 02/02/2023 10:17 by Dr. Meghana Hendricks MD.          ED Course  ED Course as of 02/02/23 1600   Thu Feb 02, 2023   1003 Reviewed pt and pt care plan with dr lema- also in agreement with care plan. Will order us of  gallbladder at this time  [CW]   1043 After assessing the patient putting orders in on the patient, patient advised the nursing staff that he just basically wanted to see if he had blood in his stool.  His Hemoccult stool was negative.  He states he will go to primary care provider to get further testing he only came because he thought he had blood in the stool.  He signed out AMA.  He is encouraged to return the emergency department if symptoms worsen [CW]      ED Course User Index  [CW] Cherelle Carrillo APRN        Medical Decision Making  Left ama - just wanted to see if black stool was blood. Hemoccult stool negative- pt states that he just wants to leave since he is not passing blood. He left ama.     Amount and/or Complexity of Data Reviewed  Radiology: ordered.           Final diagnoses:   None          Cherelle Carrillo APRN  02/02/23 1600

## 2023-02-03 ENCOUNTER — TELEPHONE (OUTPATIENT)
Dept: GASTROENTEROLOGY | Facility: CLINIC | Age: 53
End: 2023-02-03
Payer: COMMERCIAL

## 2023-02-03 NOTE — TELEPHONE ENCOUNTER
Kari from [facility] pt can not take PPIs due to interaction with his HIV medications. [phone] ext. [phone]

## 2023-02-04 ENCOUNTER — HOSPITAL ENCOUNTER (EMERGENCY)
Facility: HOSPITAL | Age: 53
Discharge: HOME | End: 2023-02-05
Attending: STUDENT IN AN ORGANIZED HEALTH CARE EDUCATION/TRAINING PROGRAM | Admitting: STUDENT IN AN ORGANIZED HEALTH CARE EDUCATION/TRAINING PROGRAM
Payer: COMMERCIAL

## 2023-02-04 ENCOUNTER — APPOINTMENT (OUTPATIENT)
Dept: ULTRASOUND IMAGING | Facility: HOSPITAL | Age: 53
End: 2023-02-04
Payer: COMMERCIAL

## 2023-02-04 DIAGNOSIS — K85.80 OTHER ACUTE PANCREATITIS WITHOUT INFECTION OR NECROSIS: Primary | ICD-10-CM

## 2023-02-04 LAB
ALBUMIN SERPL-MCNC: 4.1 G/DL (ref 3.5–5.2)
ALBUMIN/GLOB SERPL: 1.1 G/DL
ALP SERPL-CCNC: 58 U/L (ref 39–117)
ALT SERPL W P-5'-P-CCNC: 15 U/L (ref 1–41)
ANION GAP SERPL CALCULATED.3IONS-SCNC: 12 MMOL/L (ref 5–15)
AST SERPL-CCNC: 22 U/L (ref 1–40)
BASOPHILS # BLD AUTO: 0.09 10*3/MM3 (ref 0–0.2)
BASOPHILS NFR BLD AUTO: 1 % (ref 0–1.5)
BILIRUB SERPL-MCNC: <0.2 MG/DL (ref 0–1.2)
BUN SERPL-MCNC: 17 MG/DL (ref 6–20)
BUN/CREAT SERPL: 16.8 (ref 7–25)
CALCIUM SPEC-SCNC: 8.7 MG/DL (ref 8.6–10.5)
CHLORIDE SERPL-SCNC: 104 MMOL/L (ref 98–107)
CO2 SERPL-SCNC: 22 MMOL/L (ref 22–29)
CREAT SERPL-MCNC: 1.01 MG/DL (ref 0.76–1.27)
DEPRECATED RDW RBC AUTO: 43.1 FL (ref 37–54)
EGFRCR SERPLBLD CKD-EPI 2021: 88.9 ML/MIN/1.73
EOSINOPHIL # BLD AUTO: 0.29 10*3/MM3 (ref 0–0.4)
EOSINOPHIL NFR BLD AUTO: 3.3 % (ref 0.3–6.2)
ERYTHROCYTE [DISTWIDTH] IN BLOOD BY AUTOMATED COUNT: 12.4 % (ref 12.3–15.4)
GLOBULIN UR ELPH-MCNC: 3.8 GM/DL
GLUCOSE SERPL-MCNC: 117 MG/DL (ref 65–99)
HCT VFR BLD AUTO: 47.5 % (ref 37.5–51)
HGB BLD-MCNC: 16 G/DL (ref 13–17.7)
IMM GRANULOCYTES # BLD AUTO: 0.03 10*3/MM3 (ref 0–0.05)
IMM GRANULOCYTES NFR BLD AUTO: 0.3 % (ref 0–0.5)
LIPASE SERPL-CCNC: 205 U/L (ref 13–60)
LYMPHOCYTES # BLD AUTO: 3.44 10*3/MM3 (ref 0.7–3.1)
LYMPHOCYTES NFR BLD AUTO: 39.4 % (ref 19.6–45.3)
MCH RBC QN AUTO: 31.5 PG (ref 26.6–33)
MCHC RBC AUTO-ENTMCNC: 33.7 G/DL (ref 31.5–35.7)
MCV RBC AUTO: 93.5 FL (ref 79–97)
MONOCYTES # BLD AUTO: 0.71 10*3/MM3 (ref 0.1–0.9)
MONOCYTES NFR BLD AUTO: 8.1 % (ref 5–12)
NEUTROPHILS NFR BLD AUTO: 4.16 10*3/MM3 (ref 1.7–7)
NEUTROPHILS NFR BLD AUTO: 47.9 % (ref 42.7–76)
NRBC BLD AUTO-RTO: 0 /100 WBC (ref 0–0.2)
PLATELET # BLD AUTO: 240 10*3/MM3 (ref 140–450)
PMV BLD AUTO: 9.1 FL (ref 6–12)
POTASSIUM SERPL-SCNC: 4 MMOL/L (ref 3.5–5.2)
PROT SERPL-MCNC: 7.9 G/DL (ref 6–8.5)
RBC # BLD AUTO: 5.08 10*6/MM3 (ref 4.14–5.8)
SODIUM SERPL-SCNC: 138 MMOL/L (ref 136–145)
WBC NRBC COR # BLD: 8.72 10*3/MM3 (ref 3.4–10.8)

## 2023-02-04 PROCEDURE — 80053 COMPREHEN METABOLIC PANEL: CPT | Performed by: STUDENT IN AN ORGANIZED HEALTH CARE EDUCATION/TRAINING PROGRAM

## 2023-02-04 PROCEDURE — 99283 EMERGENCY DEPT VISIT LOW MDM: CPT

## 2023-02-04 PROCEDURE — 76705 ECHO EXAM OF ABDOMEN: CPT

## 2023-02-04 PROCEDURE — 96372 THER/PROPH/DIAG INJ SC/IM: CPT

## 2023-02-04 PROCEDURE — 36415 COLL VENOUS BLD VENIPUNCTURE: CPT

## 2023-02-04 PROCEDURE — 85025 COMPLETE CBC W/AUTO DIFF WBC: CPT | Performed by: STUDENT IN AN ORGANIZED HEALTH CARE EDUCATION/TRAINING PROGRAM

## 2023-02-04 PROCEDURE — 83690 ASSAY OF LIPASE: CPT | Performed by: STUDENT IN AN ORGANIZED HEALTH CARE EDUCATION/TRAINING PROGRAM

## 2023-02-04 PROCEDURE — 25010000002 DICYCLOMINE PER 20 MG: Performed by: PHYSICIAN ASSISTANT

## 2023-02-04 RX ORDER — DICYCLOMINE HYDROCHLORIDE 10 MG/ML
20 INJECTION INTRAMUSCULAR 4 TIMES DAILY
Status: DISCONTINUED | OUTPATIENT
Start: 2023-02-04 | End: 2023-02-05 | Stop reason: HOSPADM

## 2023-02-04 RX ADMIN — DICYCLOMINE HYDROCHLORIDE 20 MG: 20 INJECTION, SOLUTION INTRAMUSCULAR at 22:58

## 2023-02-05 VITALS
HEIGHT: 67 IN | WEIGHT: 160 LBS | DIASTOLIC BLOOD PRESSURE: 88 MMHG | SYSTOLIC BLOOD PRESSURE: 123 MMHG | RESPIRATION RATE: 18 BRPM | OXYGEN SATURATION: 97 % | HEART RATE: 92 BPM | TEMPERATURE: 97.7 F | BODY MASS INDEX: 25.11 KG/M2

## 2023-02-05 RX ORDER — DICYCLOMINE HCL 20 MG
20 TABLET ORAL EVERY 6 HOURS PRN
Qty: 28 TABLET | Refills: 0 | Status: SHIPPED | OUTPATIENT
Start: 2023-02-05 | End: 2023-02-12

## 2023-02-05 NOTE — DISCHARGE INSTRUCTIONS
Today you are seen for your symptoms you have evidence of very mild pancreatitis.  Your gallbladder ultrasound otherwise not show any evidence of any gallbladder infection or any gallstones.  Please call your primary care provider schedule close follow-up appointment within 2 days.  If your symptoms worsen prior please return to the emergency department immediately.

## 2023-02-05 NOTE — ED PROVIDER NOTES
Subjective   History of Present Illness    Patient is a pleasant 52-year-old gentleman who presents to ED with family member.  Chief complaint is upper abdominal pain going through and through to his back.  Patient describes that this is been ongoing for past several days.  Is constantly present worse with eating anything.  He was informed recently that this could be his pancreatitis or his gallbladder or an ulcer versus other.  He was seen in his ED on February 2, 2023 and was was offered to complete a gallbladder ultrasound but declined.  With his pain worsening, he wanted an ultrasound while here.  He denies any fever but is complain nausea without any vomiting.  He denies any change in bowel movement.  He reports that he had his appendix removed but his gallbladder still present.  He recalls that his triglycerides were really elevated as well.  He does not know the exact number.  He denies any chest pain.  He does have a history of hepatitis C and AIDS.  He denies alcohol use.  He occasionally takes ibuprofen as needed for pain.    Review of Systems   Constitutional: Positive for activity change and appetite change. Negative for fever.   Respiratory: Negative.    Cardiovascular: Negative.    Gastrointestinal: Positive for abdominal pain and nausea. Negative for vomiting.   Genitourinary: Negative.    Musculoskeletal: Negative.    Skin: Negative.    Neurological: Negative.    Psychiatric/Behavioral: Negative.    All other systems reviewed and are negative.      Past Medical History:   Diagnosis Date   • AIDS (HCC)    • Asthma    • Hep C w/ coma, chronic        Allergies   Allergen Reactions   • Amoxicillin Other (See Comments)     unknown   • Bactrim [Sulfamethoxazole-Trimethoprim]    • Other      SOME HIV MEDICATION, DONT KNOW NAME    • Remeron [Mirtazapine] Other (See Comments)     unknown   • Ziagen [Abacavir] Other (See Comments)     unknown       Past Surgical History:   Procedure Laterality Date   •  APPENDECTOMY     • COLONOSCOPY      2013? ([facility]) polyps   • COLONOSCOPY N/A 06/09/2022    Procedure: COLONOSCOPY WITH ANESTHESIA;  Surgeon: Michael Israel MD;  Location: [facility];  Service: Gastroenterology;  Laterality: N/A;  pre hx colon polyp  post; normal  Alice Anderson MD   • ENDOSCOPY     • ENDOSCOPY N/A 06/09/2022    Procedure: ESOPHAGOGASTRODUODENOSCOPY WITH ANESTHESIA;  Surgeon: Michael Israel MD;  Location: [facility] ENDOSCOPY;  Service: Gastroenterology;  Laterality: N/A;  pre dysphagia  post candidiasis; stricture dilated  Alice Anderson MD   • MANDIBLE FRACTURE SURGERY         Family History   Problem Relation Age of Onset   • Heart attack Mother    • Diabetes Mother    • Heart attack Father    • Liver cancer Father    • Colon cancer Neg Hx    • Colon polyps Neg Hx        Social History     Socioeconomic History   • Marital status:    Tobacco Use   • Smoking status: Every Day     Packs/day: 1.00     Types: Cigarettes   • Smokeless tobacco: Never   Vaping Use   • Vaping Use: Never used   Substance and Sexual Activity   • Alcohol use: No   • Drug use: Not Currently     Types: Methamphetamines   • Sexual activity: Defer       Prior to Admission medications    Medication Sig Start Date End Date Taking? Authorizing Provider   albuterol sulfate  (90 Base) MCG/ACT inhaler Inhale 2 puffs Every 4 (Four) Hours As Needed for Wheezing. 9/19/22   James Platt MD   Cholecalciferol (Vitamin D3) 50 MCG (2000 UT) capsule TAKE 1 CAPSULE BY MOUTH DAILY WITH A MEAL 5/2/22   Emergency, Nurse Murray RN   darunavir ethanolate (PREZISTA) 800 MG tablet tablet Take 800 mg by mouth.    Emergency, Nurse Murray RN   Darunavir-Cobicistat (Prezcobix) 800-150 MG per tablet Take  by mouth Daily.    Provider, MD Rudy   Diclofenac Sodium (VOLTAREN) 1 % gel gel Apply 4 g topically to the appropriate area as directed 3 (Three) Times a Day. 9/19/22   James Platt MD   dicyclomine  "(BENTYL) 10 MG capsule Take 1 capsule by mouth 4 (Four) Times a Day Before Meals & at Bedtime. 2/1/23   Carin Koch MD   dolutegravir (Tivicay) 50 MG tablet Take 50 mg by mouth Daily.    Rudy Rendon MD   Emtricitabine-Tenofovir AF (Descovy) 200-25 MG per tablet Take  by mouth Daily.    Rudy Rendon MD   esomeprazole (nexIUM) 40 MG capsule Take 1 capsule by mouth Every Morning Before Breakfast. 2/1/23   Carin Koch MD   etravirine (INTELENCE) 100 MG tablet Take  by mouth.    Emergency, Nurse Murray RN   fenofibrate 160 MG tablet Take 160 mg by mouth Daily.    Rudy Rendon MD   fluticasone (FLONASE) 50 MCG/ACT nasal spray 2 sprays into the nostril(s) as directed by provider Daily. 10/12/22   Amira Domingo APRN   ibuprofen (ADVIL,MOTRIN) 800 MG tablet Take 800 mg by mouth Daily As Needed. 4/6/22   Emergency, Nurse Epic, RN   miSOPROStol (CYTOTEC) 200 MCG tablet Take 200 mcg by mouth 4 (Four) Times a Day. 6/27/22   Rudy Rendon MD   multivitamin with minerals tablet tablet Take 1 tablet by mouth Daily.    Rudy Rendon MD   Nutritional Supplements (GUNNAR PO) Take  by mouth Daily.    Rudy Rendon MD   ondansetron ODT (ZOFRAN-ODT) 4 MG disintegrating tablet Place 1 tablet on the tongue Every 8 (Eight) Hours As Needed for Nausea or Vomiting. 2/1/23   Carin Koch MD   rilpivirine (EDURANT) 25 MG tablet tablet Take 25 mg by mouth Daily.    Rudy Rendon MD   sildenafil (VIAGRA) 50 MG tablet Take 50 mg by mouth Daily As Needed for Erectile Dysfunction.    Rudy Rendon MD   Spiriva HandiHaler 18 MCG per inhalation capsule Place  into inhaler and inhale Daily. 12/19/22   Rudy Rendon MD       Medications   dicyclomine (BENTYL) injection 20 mg (20 mg Intramuscular Given 2/4/23 2258)       /96   Pulse 93   Temp 98 °F (36.7 °C) (Oral)   Resp 20   Ht 170.2 cm (67\")   Wt 72.6 kg (160 lb)   SpO2 97%   BMI 25.06 kg/m² "       Objective   Physical Exam  Constitutional:       General: He is not in acute distress.     Appearance: He is underweight. He is not ill-appearing, toxic-appearing or diaphoretic.   HENT:      Head: Normocephalic and atraumatic.      Right Ear: External ear normal.      Left Ear: External ear normal.      Nose: Nose normal.   Eyes:      Conjunctiva/sclera: Conjunctivae normal.      Pupils: Pupils are equal, round, and reactive to light.   Neck:      Trachea: No tracheal deviation.   Cardiovascular:      Rate and Rhythm: Normal rate and regular rhythm.      Heart sounds: Normal heart sounds. No murmur heard.    No friction rub. No gallop.   Pulmonary:      Effort: Pulmonary effort is normal. No respiratory distress.      Breath sounds: Normal breath sounds. No wheezing or rales.   Chest:      Chest wall: No tenderness.   Abdominal:      General: Bowel sounds are normal. There is no distension.      Palpations: Abdomen is soft. There is no mass.      Tenderness: There is abdominal tenderness in the epigastric area. There is no guarding or rebound.   Musculoskeletal:         General: No tenderness or deformity. Normal range of motion.      Cervical back: Normal range of motion and neck supple.   Skin:     General: Skin is warm and dry.      Capillary Refill: Capillary refill takes less than 2 seconds.      Coloration: Skin is not pale.      Findings: No erythema or rash.   Neurological:      General: No focal deficit present.      Mental Status: He is alert and oriented to person, place, and time.   Psychiatric:         Mood and Affect: Mood normal.         Behavior: Behavior normal. Behavior is cooperative.         Thought Content: Thought content normal.         Judgment: Judgment normal.         Procedures         Lab Results (last 24 hours)     Procedure Component Value Units Date/Time    CBC & Differential [[id number]]  (Abnormal) Collected: 02/04/23 2114    Specimen: Blood Updated: 02/04/23 2133    Narrative:       The following orders were created for panel order CBC & Differential.  Procedure                               Abnormality         Status                     ---------                               -----------         ------                     CBC Auto Differential[[id number]]        Abnormal            Final result                 Please view results for these tests on the individual orders.    Comprehensive Metabolic Panel [[id number]]  (Abnormal) Collected: 02/04/23 2114    Specimen: Blood Updated: 02/04/23 2155     Glucose 117 mg/dL      BUN 17 mg/dL      Creatinine 1.01 mg/dL      Sodium 138 mmol/L      Potassium 4.0 mmol/L      Comment: Slight hemolysis detected by analyzer. Results may be affected.        Chloride 104 mmol/L      CO2 22.0 mmol/L      Calcium 8.7 mg/dL      Total Protein 7.9 g/dL      Albumin 4.1 g/dL      ALT (SGPT) 15 U/L      AST (SGOT) 22 U/L      Comment: Slight hemolysis detected by analyzer. Results may be affected.        Alkaline Phosphatase 58 U/L      Total Bilirubin <0.2 mg/dL      Globulin 3.8 gm/dL      A/G Ratio 1.1 g/dL      BUN/Creatinine Ratio 16.8     Anion Gap 12.0 mmol/L      eGFR 88.9 mL/min/1.73     Narrative:      GFR Normal >60  Chronic Kidney Disease <60  Kidney Failure <15      Lipase [[id number]]  (Abnormal) Collected: 02/04/23 2114    Specimen: Blood Updated: 02/04/23 2146     Lipase 205 U/L     CBC Auto Differential [[id number]]  (Abnormal) Collected: 02/04/23 2114    Specimen: Blood Updated: 02/04/23 2133     WBC 8.72 10*3/mm3      RBC 5.08 10*6/mm3      Hemoglobin 16.0 g/dL      Hematocrit 47.5 %      MCV 93.5 fL      MCH 31.5 pg      MCHC 33.7 g/dL      RDW 12.4 %      RDW-SD 43.1 fl      MPV 9.1 fL      Platelets 240 10*3/mm3      Neutrophil % 47.9 %      Lymphocyte % 39.4 %      Monocyte % 8.1 %      Eosinophil % 3.3 %      Basophil % 1.0 %      Immature Grans % 0.3 %      Neutrophils, Absolute 4.16 10*3/mm3      Lymphocytes, Absolute 3.44 10*3/mm3       Monocytes, Absolute 0.71 10*3/mm3      Eosinophils, Absolute 0.29 10*3/mm3      Basophils, Absolute 0.09 10*3/mm3      Immature Grans, Absolute 0.03 10*3/mm3      nRBC 0.0 /100 WBC           CT Abdomen Pelvis With Contrast    Result Date: 2/1/2023  Narrative: EXAMINATION: CT ABDOMEN PELVIS W CONTRAST-   2/1/2023 7:56 AM CST  HISTORY: Pancreatitis, acute, severe  In order to have a CT radiation dose as low as reasonably achievable Automated Exposure Control was utilized for adjustment of the mA and/or KV according to patient size.  DLP in mGycm= 246  The CT scan of the abdomen and pelvis are performed after intravenous contrast enhancement.  Images are acquired in axial plane and subsequent reconstruction in coronal and sagittal planes.  Comparison is made with the previous study dated 09/23/2016.  The lung bases included in the study are normal.  The limited visualized cardiomediastinal structures are normal. No cardiomegaly.  There is mild thickening of the limited visualized distal esophagus.  There is fatty infiltration of the liver. No focal intrinsic abnormality.  The spleen is normal.  No radiopaque gallstones.  The pancreas appear normal. Limited visualized pancreatic duct is normal.  The adrenal glands bilaterally are normal.  The kidneys bilaterally are normal. There is a tiny cortical nodule located posteriorly in the lower pole of the left kidney which is too small to be further characterized. No calculi. No hydronephrosis. Limited visualized ureters are normal. The urinary bladder is poorly distended and moderate thickening of the walls. This is probably due to incomplete distention.  Prostate is not significantly enlarged.  There is a tiny fat-containing umbilical hernia. The stomach is poorly distended is moderate thickening of the walls. The duodenum is normal. Small bowel is nondistended. The appendix is surgically absent. Moderate gas and stool is seen in the colon. No finding to suggest  obstruction.  Atheromatous changes of the abdominal aorta and iliac arteries. No aneurysmal dilatation.  There are several nonspecific, small, nonenlarged, retroperitoneal para-aortic lymph nodes. No enlarged lymph nodes.  Images reviewed in bone window show moderate chronic degenerative changes of the thoracolumbar spine. No focal bony lesion. Old healed fracture of the right ribs are noted.      Impression: 1. No acute abnormality of the abdomen or pelvis, particularly, no evidence of pancreatitis. 2. Fatty infiltration of the liver.      This report was finalized on 02/01/2023 08:36 by Dr. Destini Giron MD.    XR Abdomen KUB    Result Date: 2/2/2023  Narrative: HISTORY: Abdominal pain  KUB: Frontal view the abdomen obtained.  Moderate fecal stasis. No small bowel dilatation. No discrete pathologic calcifications. No organomegaly. Early degenerative change lumbar spine.      Impression: 1. Moderate fecal stasis. No radiographic evidence of bowel obstruction. No pathologic calcifications. This report was finalized on 02/02/2023 10:17 by Dr. Meghana Hendricks MD.      ED Course  ED Course as of 02/05/23 0210   Sun Feb 05, 2023   0209 I reviewed this case with Dr. Molina who will be assuming the patient's care.  [TK]      ED Course User Index  [TK] Lawrence Branham PA          [facility]    Final diagnoses:   Other acute pancreatitis without infection or necrosis          Lawrence Branham PA  02/05/23 0222

## 2023-02-07 ENCOUNTER — TELEPHONE (OUTPATIENT)
Dept: GASTROENTEROLOGY | Facility: CLINIC | Age: 53
End: 2023-02-07

## 2023-02-07 ENCOUNTER — OFFICE VISIT (OUTPATIENT)
Dept: GASTROENTEROLOGY | Facility: CLINIC | Age: 53
End: 2023-02-07
Payer: COMMERCIAL

## 2023-02-07 VITALS
WEIGHT: 160 LBS | DIASTOLIC BLOOD PRESSURE: 76 MMHG | SYSTOLIC BLOOD PRESSURE: 126 MMHG | TEMPERATURE: 97.3 F | BODY MASS INDEX: 25.11 KG/M2 | OXYGEN SATURATION: 97 % | HEART RATE: 86 BPM | HEIGHT: 67 IN

## 2023-02-07 DIAGNOSIS — R74.8 ELEVATED LIPASE: ICD-10-CM

## 2023-02-07 DIAGNOSIS — R10.13 EPIGASTRIC PAIN: Primary | ICD-10-CM

## 2023-02-07 DIAGNOSIS — R93.5 ABNORMAL CT OF THE ABDOMEN: ICD-10-CM

## 2023-02-07 PROCEDURE — 99214 OFFICE O/P EST MOD 30 MIN: CPT | Performed by: NURSE PRACTITIONER

## 2023-02-07 NOTE — TELEPHONE ENCOUNTER
Pt was taking fenofibrate for cholesterol and Miladis Leo state he can't take pepcid or carafate.

## 2023-02-07 NOTE — TELEPHONE ENCOUNTER
Jadon, please request last triglyceride level from Dr. Felix's office.  Also ask them if the patient would be able to take Pepcid or Carafate daily.  I do have concerns that the Carafate would interfere with his HIV medicines.  I would also like to know the cholesterol medicine that was recently discontinued by their office.

## 2023-02-07 NOTE — PROGRESS NOTES
[facility] GASTROENTEROLOGY - OFFICE NOTE    2/7/2023    Alex Ghotra   1970    Primary Physician: Alice Anderson MD    Chief Complaint   Patient presents with   • Follow-up     Recent ER visit     Abdominal pain     HISTORY OF PRESENT ILLNESS:     Alex Ghotra is a 52 y.o. male presents with upper abdominal pain. This started 1 week ago. Location is [facility] area. This is improving. He was started on a new med for cholesterol recently but stopped due to upper abdominal pain (  Dr. Sutherland). The pain was not triggered by any certain foods. The pain did radiate to the back. He was taking ibuprofen daily for 2 months, stopped.  No asa.  No etoh, no history of etoh. No n/v. No fever or jaundice. No black stool.       He has history of high triglycerides. Just had checked at Dr. Sutherland's office.      He was ween in the ED here at [facility] 2-4-23 for eval of this pain.  He was prescribed bentyl that has helped the abdominal pain. Lipase was mildly elevated. Lft;s normal.  Wbc and hgb normal. Stool occult blood neg.       US Gallbladder (02/05/2023 01:12)  IMPRESSION:  1. Contracted gallbladder may relate to nonfasting state. No discrete  gallstones. No biliary dilatation.  2. Probable mild hepatic steatosis.      XR Abdomen KUB (02/02/2023 10:12)  IMPRESSION:  1. Moderate fecal stasis. No radiographic evidence of bowel obstruction.  No pathologic calcifications.    CT Abdomen Pelvis With Contrast (02/01/2023 08:05)   IMPRESSION:  1. No acute abnormality of the abdomen or pelvis, particularly, no  evidence of pancreatitis.  2. Fatty infiltration of the liver.             COLONOSCOPY (06/09/2022 11:11)    UPPER GI ENDOSCOPY (06/09/2022 11:08)      He was treated for hep c several months and was successful.       Past Medical History:   Diagnosis Date   • AIDS (HCC)    • Asthma    • Hep C w/ coma, chronic        Past Surgical History:   Procedure Laterality Date   • APPENDECTOMY     •  COLONOSCOPY      2013? ([facility]) polyps   • COLONOSCOPY N/A 06/09/2022    Procedure: COLONOSCOPY WITH ANESTHESIA;  Surgeon: Michael Israel MD;  Location: [facility] ENDOSCOPY;  Service: Gastroenterology;  Laterality: N/A;  pre hx colon polyp  post; normal  Alice Anderson MD   • ENDOSCOPY     • ENDOSCOPY N/A 06/09/2022    Procedure: ESOPHAGOGASTRODUODENOSCOPY WITH ANESTHESIA;  Surgeon: Michael Israel MD;  Location: [facility] ENDOSCOPY;  Service: Gastroenterology;  Laterality: N/A;  pre dysphagia  post candidiasis; stricture dilated  Alice Anderson MD   • MANDIBLE FRACTURE SURGERY         Outpatient Medications Marked as Taking for the 2/7/23 encounter (Office Visit) with Latricia Camacho APRN   Medication Sig Dispense Refill   • albuterol sulfate  (90 Base) MCG/ACT inhaler Inhale 2 puffs Every 4 (Four) Hours As Needed for Wheezing. 1 g 0   • Cholecalciferol (Vitamin D3) 50 MCG (2000 UT) capsule TAKE 1 CAPSULE BY MOUTH DAILY WITH A MEAL     • darunavir ethanolate (PREZISTA) 800 MG tablet tablet Take 800 mg by mouth.     • Darunavir-Cobicistat (Prezcobix) 800-150 MG per tablet Take  by mouth Daily.     • Diclofenac Sodium (VOLTAREN) 1 % gel gel Apply 4 g topically to the appropriate area as directed 3 (Three) Times a Day. (Patient taking differently: Apply 4 g topically to the appropriate area as directed As Needed.) 150 g 0   • dicyclomine (BENTYL) 20 MG tablet Take 1 tablet by mouth Every 6 (Six) Hours As Needed (abdominal pain) for up to 7 days. 28 tablet 0   • dolutegravir (Tivicay) 50 MG tablet Take 50 mg by mouth Daily.     • Emtricitabine-Tenofovir AF (Descovy) 200-25 MG per tablet Take  by mouth Daily.     • etravirine (INTELENCE) 100 MG tablet Take  by mouth.     • fluticasone (FLONASE) 50 MCG/ACT nasal spray 2 sprays into the nostril(s) as directed by provider Daily. 16 g 5   • ibuprofen (ADVIL,MOTRIN) 800 MG tablet Take 800 mg by mouth Daily As Needed.     • miSOPROStol (CYTOTEC) 200 MCG  "tablet Take 200 mcg by mouth 4 (Four) Times a Day.     • multivitamin with minerals tablet tablet Take 1 tablet by mouth Daily.     • Nutritional Supplements (GUNNAR PO) Take  by mouth Daily.     • rilpivirine (EDURANT) 25 MG tablet tablet Take 25 mg by mouth Daily.     • sildenafil (VIAGRA) 50 MG tablet Take 50 mg by mouth Daily As Needed for Erectile Dysfunction.     • Spiriva HandiHaler 18 MCG per inhalation capsule Place  into inhaler and inhale Daily.         Allergies   Allergen Reactions   • Amoxicillin Other (See Comments)     unknown   • Bactrim [Sulfamethoxazole-Trimethoprim]    • Other      SOME HIV MEDICATION, DONT KNOW NAME    • Remeron [Mirtazapine] Other (See Comments)     unknown   • Ziagen [Abacavir] Other (See Comments)     unknown       Social History     Socioeconomic History   • Marital status:    Tobacco Use   • Smoking status: Every Day     Packs/day: 1.00     Types: Cigarettes   • Smokeless tobacco: Never   Vaping Use   • Vaping Use: Never used   Substance and Sexual Activity   • Alcohol use: No   • Drug use: Not Currently     Types: Methamphetamines   • Sexual activity: Defer       Family History   Problem Relation Age of Onset   • Heart attack Mother    • Diabetes Mother    • Heart attack Father    • Liver cancer Father    • Colon cancer Neg Hx    • Colon polyps Neg Hx        Review of Systems   Constitutional: Negative for chills, fever and unexpected weight change.   Respiratory: Negative for shortness of breath.    Cardiovascular: Negative for chest pain.   Gastrointestinal: Negative for abdominal distention, anal bleeding, blood in stool, constipation, diarrhea and vomiting.        Vitals:    02/07/23 1231   BP: 126/76   Pulse: 86   Temp: 97.3 °F (36.3 °C)   SpO2: 97%   Weight: 72.6 kg (160 lb)   Height: 170.2 cm (67\")      Body mass index is 25.06 kg/m².    Physical Exam  Vitals reviewed.   Constitutional:       General: He is not in acute distress.  Cardiovascular:      Rate " and Rhythm: Normal rate and regular rhythm.      Heart sounds: Normal heart sounds.   Pulmonary:      Effort: Pulmonary effort is normal.      Breath sounds: Normal breath sounds.   Abdominal:      General: Bowel sounds are normal. There is no distension.      Palpations: Abdomen is soft.      Tenderness: There is no abdominal tenderness.   Skin:     General: Skin is warm and dry.   Neurological:      Mental Status: He is alert.         Results for orders placed or performed during the hospital encounter of 02/04/23   Comprehensive Metabolic Panel    Specimen: Blood   Result Value Ref Range    Glucose 117 (H) 65 - 99 mg/dL    BUN 17 6 - 20 mg/dL    Creatinine 1.01 0.76 - 1.27 mg/dL    Sodium 138 136 - 145 mmol/L    Potassium 4.0 3.5 - 5.2 mmol/L    Chloride 104 98 - 107 mmol/L    CO2 22.0 22.0 - 29.0 mmol/L    Calcium 8.7 8.6 - 10.5 mg/dL    Total Protein 7.9 6.0 - 8.5 g/dL    Albumin 4.1 3.5 - 5.2 g/dL    ALT (SGPT) 15 1 - 41 U/L    AST (SGOT) 22 1 - 40 U/L    Alkaline Phosphatase 58 39 - 117 U/L    Total Bilirubin <0.2 0.0 - 1.2 mg/dL    Globulin 3.8 gm/dL    A/G Ratio 1.1 g/dL    BUN/Creatinine Ratio 16.8 7.0 - 25.0    Anion Gap 12.0 5.0 - 15.0 mmol/L    eGFR 88.9 >60.0 mL/min/1.73   Lipase    Specimen: Blood   Result Value Ref Range    Lipase 205 (H) 13 - 60 U/L   CBC Auto Differential    Specimen: Blood   Result Value Ref Range    WBC 8.72 3.40 - 10.80 10*3/mm3    RBC 5.08 4.14 - 5.80 10*6/mm3    Hemoglobin 16.0 13.0 - 17.7 g/dL    Hematocrit 47.5 37.5 - 51.0 %    MCV 93.5 79.0 - 97.0 fL    MCH 31.5 26.6 - 33.0 pg    MCHC 33.7 31.5 - 35.7 g/dL    RDW 12.4 12.3 - 15.4 %    RDW-SD 43.1 37.0 - 54.0 fl    MPV 9.1 6.0 - 12.0 fL    Platelets 240 140 - 450 10*3/mm3    Neutrophil % 47.9 42.7 - 76.0 %    Lymphocyte % 39.4 19.6 - 45.3 %    Monocyte % 8.1 5.0 - 12.0 %    Eosinophil % 3.3 0.3 - 6.2 %    Basophil % 1.0 0.0 - 1.5 %    Immature Grans % 0.3 0.0 - 0.5 %    Neutrophils, Absolute 4.16 1.70 - 7.00 10*3/mm3     Lymphocytes, Absolute 3.44 (H) 0.70 - 3.10 10*3/mm3    Monocytes, Absolute 0.71 0.10 - 0.90 10*3/mm3    Eosinophils, Absolute 0.29 0.00 - 0.40 10*3/mm3    Basophils, Absolute 0.09 0.00 - 0.20 10*3/mm3    Immature Grans, Absolute 0.03 0.00 - 0.05 10*3/mm3    nRBC 0.0 0.0 - 0.2 /100 WBC           ASSESSMENT AND PLAN    Assessment & Plan     Diagnoses and all orders for this visit:    1. Epigastric pain (Primary)  -     Case Request; Standing  -     Case Request  -     NM HIDA SCAN WITHOUT PHARMACOLOGICAL INTERVENTION; Future    2. Elevated lipase    3. Abnormal CT of the abdomen  -     Case Request; Standing  -     Case Request    Other orders  -     Implement Anesthesia Orders Day of Procedure; Standing  -     Obtain Informed Consent; Standing       Differential diagnoses discussed.  Ultrasound gallbladder with no stones. I recommend check hida scan.  I recommend plan for EGD.  Check with Dr. Sutherland's office if can take pepcid or carafate and which med for cholesterol he was taking.  Also request last triglyceride level from Dr. Sutherland's office.  I recommend emergency room if worsening or severe symptoms.         I reviewed reports from recent Emergency Room visit. Ct abdomen noted mild thickening of the distal esophagus and moderate thickening of gastric walls. The pancreas was normal. Lipase was mildly elevated so question if had pancreatitis. Will await egd and hida scan results. He may need to be referred for EUS ( await egd results).             ESOPHAGOGASTRODUODENOSCOPY WITH ANESTHESIA (N/A)  Risk, benefits, and alternatives of endoscopy were explained in full.  They understand that there is a risk of bleeding, perforation, and infection.  The risk of perforation goes up with esophageal dilation.  Other options to evaluate UGI complaints could involve barium swallow or UGI series, but these would be diagnostic tests only.  Patient was given time to ask questions.  I answered them to their satisfaction and  they are agreeable to proceeding         No follow-ups on file.          There are no Patient Instructions on file for this visit.      Latricia Camacho, APRN

## 2023-02-17 ENCOUNTER — HOSPITAL ENCOUNTER (OUTPATIENT)
Dept: NUCLEAR MEDICINE | Facility: HOSPITAL | Age: 53
Discharge: HOME | End: 2023-02-17
Payer: COMMERCIAL

## 2023-02-17 DIAGNOSIS — R10.13 EPIGASTRIC PAIN: ICD-10-CM

## 2023-02-17 PROCEDURE — 0 TECHNETIUM TC 99M MEBROFENIN KIT: Performed by: NURSE PRACTITIONER

## 2023-02-17 PROCEDURE — 78226 HEPATOBILIARY SYSTEM IMAGING: CPT

## 2023-02-17 PROCEDURE — A9537 TC99M MEBROFENIN: HCPCS | Performed by: NURSE PRACTITIONER

## 2023-02-17 RX ORDER — KIT FOR THE PREPARATION OF TECHNETIUM TC 99M MEBROFENIN 45 MG/10ML
1 INJECTION, POWDER, LYOPHILIZED, FOR SOLUTION INTRAVENOUS
Status: COMPLETED | OUTPATIENT
Start: 2023-02-17 | End: 2023-02-17

## 2023-02-17 RX ADMIN — MEBROFENIN 1 DOSE: 45 INJECTION, POWDER, LYOPHILIZED, FOR SOLUTION INTRAVENOUS at 14:30

## 2023-03-08 ENCOUNTER — TRANSCRIBE ORDERS (OUTPATIENT)
Dept: ADMINISTRATIVE | Facility: HOSPITAL | Age: 53
End: 2023-03-08
Payer: COMMERCIAL

## 2023-03-08 ENCOUNTER — HOSPITAL ENCOUNTER (OUTPATIENT)
Dept: GENERAL RADIOLOGY | Facility: HOSPITAL | Age: 53
Discharge: HOME | End: 2023-03-08
Admitting: PHYSICIAN ASSISTANT
Payer: COMMERCIAL

## 2023-03-08 DIAGNOSIS — M54.2 NECK PAIN: Primary | ICD-10-CM

## 2023-03-08 DIAGNOSIS — M54.2 NECK PAIN: ICD-10-CM

## 2023-03-08 PROCEDURE — 72050 X-RAY EXAM NECK SPINE 4/5VWS: CPT

## 2023-03-14 ENCOUNTER — ANESTHESIA (OUTPATIENT)
Dept: GASTROENTEROLOGY | Facility: HOSPITAL | Age: 53
End: 2023-03-14
Payer: COMMERCIAL

## 2023-03-14 ENCOUNTER — HOSPITAL ENCOUNTER (OUTPATIENT)
Facility: HOSPITAL | Age: 53
Setting detail: HOSPITAL OUTPATIENT SURGERY
Discharge: HOME | End: 2023-03-14
Attending: INTERNAL MEDICINE | Admitting: ANESTHESIOLOGY
Payer: COMMERCIAL

## 2023-03-14 ENCOUNTER — ANESTHESIA EVENT (OUTPATIENT)
Dept: GASTROENTEROLOGY | Facility: HOSPITAL | Age: 53
End: 2023-03-14
Payer: COMMERCIAL

## 2023-03-14 VITALS
DIASTOLIC BLOOD PRESSURE: 82 MMHG | HEIGHT: 67 IN | RESPIRATION RATE: 20 BRPM | BODY MASS INDEX: 24.33 KG/M2 | WEIGHT: 155 LBS | SYSTOLIC BLOOD PRESSURE: 117 MMHG | OXYGEN SATURATION: 98 % | TEMPERATURE: 97.2 F | HEART RATE: 88 BPM

## 2023-03-14 DIAGNOSIS — R10.13 EPIGASTRIC PAIN: ICD-10-CM

## 2023-03-14 DIAGNOSIS — R93.5 ABNORMAL CT OF THE ABDOMEN: ICD-10-CM

## 2023-03-14 PROCEDURE — 25010000002 PROPOFOL 10 MG/ML EMULSION: Performed by: NURSE ANESTHETIST, CERTIFIED REGISTERED

## 2023-03-14 PROCEDURE — 88305 TISSUE EXAM BY PATHOLOGIST: CPT | Performed by: INTERNAL MEDICINE

## 2023-03-14 PROCEDURE — 43239 EGD BIOPSY SINGLE/MULTIPLE: CPT | Performed by: INTERNAL MEDICINE

## 2023-03-14 RX ORDER — ONDANSETRON 2 MG/ML
4 INJECTION INTRAMUSCULAR; INTRAVENOUS ONCE AS NEEDED
Status: DISCONTINUED | OUTPATIENT
Start: 2023-03-14 | End: 2023-03-14 | Stop reason: HOSPADM

## 2023-03-14 RX ORDER — SODIUM CHLORIDE 0.9 % (FLUSH) 0.9 %
10 SYRINGE (ML) INJECTION AS NEEDED
Status: DISCONTINUED | OUTPATIENT
Start: 2023-03-14 | End: 2023-03-14 | Stop reason: HOSPADM

## 2023-03-14 RX ORDER — LIDOCAINE HYDROCHLORIDE 10 MG/ML
0.5 INJECTION, SOLUTION EPIDURAL; INFILTRATION; INTRACAUDAL; PERINEURAL ONCE AS NEEDED
Status: DISCONTINUED | OUTPATIENT
Start: 2023-03-14 | End: 2023-03-14 | Stop reason: HOSPADM

## 2023-03-14 RX ORDER — LIDOCAINE HYDROCHLORIDE 20 MG/ML
INJECTION, SOLUTION EPIDURAL; INFILTRATION; INTRACAUDAL; PERINEURAL AS NEEDED
Status: DISCONTINUED | OUTPATIENT
Start: 2023-03-14 | End: 2023-03-14 | Stop reason: SURG

## 2023-03-14 RX ORDER — SODIUM CHLORIDE 9 MG/ML
500 INJECTION, SOLUTION INTRAVENOUS CONTINUOUS PRN
Status: DISCONTINUED | OUTPATIENT
Start: 2023-03-14 | End: 2023-03-14 | Stop reason: HOSPADM

## 2023-03-14 RX ORDER — PROPOFOL 10 MG/ML
VIAL (ML) INTRAVENOUS AS NEEDED
Status: DISCONTINUED | OUTPATIENT
Start: 2023-03-14 | End: 2023-03-14 | Stop reason: SURG

## 2023-03-14 RX ADMIN — PROPOFOL INJECTABLE EMULSION 140 MG: 10 INJECTION, EMULSION INTRAVENOUS at 13:29

## 2023-03-14 RX ADMIN — SODIUM CHLORIDE 500 ML: 9 INJECTION, SOLUTION INTRAVENOUS at 10:47

## 2023-03-14 RX ADMIN — LIDOCAINE HYDROCHLORIDE 100 MG: 20 INJECTION, SOLUTION EPIDURAL; INFILTRATION; INTRACAUDAL; PERINEURAL at 13:29

## 2023-03-14 NOTE — H&P
[facility] Gastroenterology  Pre Procedure History & Physical    Chief Complaint:   Epigastric pain    Subjective     HPI:   He has been having intermittent epigastric discomfort.  He has been evaluated and ultrasound with gallbladder HIDA scan CT scan and x-ray imaging as well as labs.  He states his pain is better since he is on a lower fat diet.  He presents today for endoscopy exam.  CT did show some thickening in the esophagus.    Past Medical History:   Past Medical History:   Diagnosis Date   • AIDS (HCC)    • Asthma    • Hep C w/ coma, chronic        Past Surgical History:  Past Surgical History:   Procedure Laterality Date   • APPENDECTOMY     • COLONOSCOPY      2013? ([facility]) polyps   • COLONOSCOPY N/A 06/09/2022    Procedure: COLONOSCOPY WITH ANESTHESIA;  Surgeon: Michael Israel MD;  Location: [facility] ENDOSCOPY;  Service: Gastroenterology;  Laterality: N/A;  pre hx colon polyp  post; normal  Alice Anderson MD   • ENDOSCOPY     • ENDOSCOPY N/A 06/09/2022    Procedure: ESOPHAGOGASTRODUODENOSCOPY WITH ANESTHESIA;  Surgeon: Michael Israel MD;  Location: [facility] ENDOSCOPY;  Service: Gastroenterology;  Laterality: N/A;  pre dysphagia  post candidiasis; stricture dilated  Alice Anderson MD   • MANDIBLE FRACTURE SURGERY          Family History:  Family History   Problem Relation Age of Onset   • Heart attack Mother    • Diabetes Mother    • Heart attack Father    • Liver cancer Father    • Colon cancer Neg Hx    • Colon polyps Neg Hx        Social History:   reports that he has been smoking cigarettes. He has a 40.00 pack-year smoking history. He has never used smokeless tobacco. He reports that he does not currently use drugs after having used the following drugs: Methamphetamines. He reports that he does not drink alcohol.    Medications:   Prior to Admission medications    Medication Sig Start Date End Date Taking? Authorizing Provider   Cholecalciferol (Vitamin D3) 50 MCG (2000 UT)  capsule TAKE 1 CAPSULE BY MOUTH DAILY WITH A MEAL 5/2/22  Yes Emergency, Nurse DAVIS Gao   Darunavir-Cobicistat (Prezcobix) 800-150 MG per tablet Take  by mouth Daily.   Yes ProviderRudy MD   dolutegravir (Tivicay) 50 MG tablet Take 1 tablet by mouth Daily.   Yes ProviderRudy MD   esomeprazole (nexIUM) 40 MG capsule Take 1 capsule by mouth Every Morning Before Breakfast. 2/1/23  Yes Carin Koch MD   fluticasone (FLONASE) 50 MCG/ACT nasal spray 2 sprays into the nostril(s) as directed by provider Daily. 10/12/22  Yes Amira Domingo APRN   ibuprofen (ADVIL,MOTRIN) 800 MG tablet Take 1 tablet by mouth Daily As Needed. 4/6/22  Yes Emergency, Nurse DAVIS Gao   multivitamin with minerals tablet tablet Take 1 tablet by mouth Daily.   Yes ProviderRudy MD   Nutritional Supplements (GUNNAR PO) Take  by mouth Daily.   Yes ProviderRudy MD   sildenafil (VIAGRA) 50 MG tablet Take 1 tablet by mouth Daily As Needed for Erectile Dysfunction.   Yes Provider, MD Rudy   Spiriva HandiHaler 18 MCG per inhalation capsule Place  into inhaler and inhale Daily. 12/19/22  Yes ProviderRudy MD   albuterol sulfate  (90 Base) MCG/ACT inhaler Inhale 2 puffs Every 4 (Four) Hours As Needed for Wheezing. 9/19/22   James Platt MD   darunavir ethanolate (PREZISTA) 800 MG tablet tablet Take 1 tablet by mouth.    Emergency, Nurse DAVIS Gao   Diclofenac Sodium (VOLTAREN) 1 % gel gel Apply 4 g topically to the appropriate area as directed 3 (Three) Times a Day.  Patient taking differently: Apply 4 g topically to the appropriate area as directed As Needed. 9/19/22   James Platt MD   dicyclomine (BENTYL) 10 MG capsule Take 1 capsule by mouth 4 (Four) Times a Day Before Meals & at Bedtime. 2/1/23   Carin Koch MD   Emtricitabine-Tenofovir AF (Descovy) 200-25 MG per tablet Take  by mouth Daily.    ProviderRudy MD   etravirine (INTELENCE) 100 MG tablet Take  by mouth.     "Emergency, Nurse Murray, RN   fenofibrate 160 MG tablet Take 1 tablet by mouth Daily.    ProviderRudy MD   miSOPROStol (CYTOTEC) 200 MCG tablet Take 1 tablet by mouth 4 (Four) Times a Day. 6/27/22   Rudy Rendon MD   ondansetron ODT (ZOFRAN-ODT) 4 MG disintegrating tablet Place 1 tablet on the tongue Every 8 (Eight) Hours As Needed for Nausea or Vomiting. 2/1/23   Carin Koch MD   rilpivirine (EDURANT) 25 MG tablet tablet Take 1 tablet by mouth Daily.    Provider, MD Rudy       Allergies:  Amoxicillin, Other, Remeron [mirtazapine], Ziagen [abacavir], and Bactrim [sulfamethoxazole-trimethoprim]    ROS:    General: Weight stable  Respiratory: No SOA  Cardiovascular: No CP    Objective     Blood pressure 130/83, pulse 76, temperature 97.2 °F (36.2 °C), temperature source Temporal, resp. rate 20, height 170.2 cm (67\"), weight 70.3 kg (155 lb), SpO2 97 %.    Physical Exam   Constitutional: Pt is oriented to person, place, and in no distress.   Cardiovascular: Normal rate, regular rhythm.    Pulmonary/Chest: Effort normal. No respiratory distress.    Abdominal: Nondistended.  Psychiatric: Mood, memory, affect and judgment appear normal.     Assessment & Plan     Diagnosis:  Abnormal CT  Epigastric pain-improved  Anticipated Surgical Procedure:  Endoscopy    The risks, benefits, and alternatives of this procedure have been discussed with the patient or the responsible party- the patient understands and agrees to proceed.    EMR Dragon/transcription disclaimer:  Much of this encounter note is electronic transcription/translation of spoken language to printed text.  The electronic translation of spoken language may be erroneous, or at times, nonsensical words or phrases may be inadvertently transcribed.  Although I have reviewed the note for such errors, some may still exist.  "

## 2023-03-14 NOTE — ANESTHESIA PREPROCEDURE EVALUATION
Anesthesia Evaluation     Patient summary reviewed and Nursing notes reviewed   no history of anesthetic complications:  NPO Solid Status: > 8 hours  NPO Liquid Status: > 4 hours           Airway   Mallampati: I  TM distance: >3 FB  Neck ROM: full  No difficulty expected  Dental    (+) edentulous    Pulmonary    (+) a smoker Current, asthma,  Cardiovascular   Exercise tolerance: good (4-7 METS)    (+) hyperlipidemia,   (-) hypertension, CAD      Neuro/Psych  (+) seizures,    (-) TIA, CVA  GI/Hepatic/Renal/Endo    (+)   hepatitis C,   (-) no renal disease, diabetes    ROS Comment: HIV     Musculoskeletal     Abdominal    Substance History      OB/GYN          Other                          Anesthesia Plan    ASA 3     MAC     intravenous induction     Anesthetic plan, risks, benefits, and alternatives have been provided, discussed and informed consent has been obtained with: patient.        CODE STATUS:

## 2023-03-14 NOTE — ANESTHESIA POSTPROCEDURE EVALUATION
"Patient: Alex Ghotra    Procedure Summary     Date: 03/14/23 Room / Location: [facility] ENDOSCOPY 4 / [facility] ENDOSCOPY    Anesthesia Start: 1326 Anesthesia Stop: 1550    Procedure: ESOPHAGOGASTRODUODENOSCOPY WITH ANESTHESIA Diagnosis:       Epigastric pain      Abnormal CT of the abdomen      (Epigastric pain [R10.13])      (Abnormal CT of the abdomen [R93.5])    Surgeons: Michael Israel MD Provider: Micheal Osman CRNA    Anesthesia Type: MAC ASA Status: 3          Anesthesia Type: MAC    Vitals  Vitals Value Taken Time   /82 03/14/23 1351   Temp     Pulse 78 03/14/23 1353   Resp 20 03/14/23 1350   SpO2 98 % 03/14/23 1353   Vitals shown include unvalidated device data.        Post Anesthesia Care and Evaluation    Patient location during evaluation: PACU  Patient participation: complete - patient participated  Level of consciousness: awake and alert  Pain management: adequate    Airway patency: patent  Anesthetic complications: No anesthetic complications    Cardiovascular status: acceptable  Respiratory status: acceptable  Hydration status: acceptable    Comments: Blood pressure 117/82, pulse 88, temperature 97.2 °F (36.2 °C), temperature source Temporal, resp. rate 20, height 170.2 cm (67\"), weight 70.3 kg (155 lb), SpO2 98 %.    Pt discharged from PACU based on evaristo score >8      "

## 2023-03-14 NOTE — ADDENDUM NOTE
Addendum  created 03/14/23 1558 by Brandon Lyn CRNA    Intraprocedure Event deleted, Intraprocedure Event edited, Intraprocedure Staff edited

## 2023-03-15 LAB
CYTO UR: NORMAL
LAB AP CASE REPORT: NORMAL
Lab: NORMAL
PATH REPORT.FINAL DX SPEC: NORMAL
PATH REPORT.GROSS SPEC: NORMAL

## 2023-05-08 ENCOUNTER — OFFICE VISIT (OUTPATIENT)
Dept: PRIMARY CARE CLINIC | Age: 53
End: 2023-05-08
Payer: MEDICAID

## 2023-05-08 VITALS
HEIGHT: 67 IN | SYSTOLIC BLOOD PRESSURE: 118 MMHG | DIASTOLIC BLOOD PRESSURE: 74 MMHG | OXYGEN SATURATION: 98 % | WEIGHT: 163 LBS | HEART RATE: 85 BPM | BODY MASS INDEX: 25.58 KG/M2 | TEMPERATURE: 98.5 F

## 2023-05-08 DIAGNOSIS — E78.1 HYPERTRIGLYCERIDEMIA: ICD-10-CM

## 2023-05-08 DIAGNOSIS — F19.11 HISTORY OF INTRAVENOUS DRUG ABUSE (HCC): ICD-10-CM

## 2023-05-08 DIAGNOSIS — R10.12 LEFT UPPER QUADRANT ABDOMINAL PAIN: ICD-10-CM

## 2023-05-08 DIAGNOSIS — R93.0 ABNORMAL MRI OF HEAD: ICD-10-CM

## 2023-05-08 DIAGNOSIS — H90.12 CONDUCTIVE HEARING LOSS OF LEFT EAR, UNSPECIFIED HEARING STATUS ON CONTRALATERAL SIDE: ICD-10-CM

## 2023-05-08 DIAGNOSIS — J41.8 MIXED SIMPLE AND MUCOPURULENT CHRONIC BRONCHITIS (HCC): ICD-10-CM

## 2023-05-08 DIAGNOSIS — F41.1 GAD (GENERALIZED ANXIETY DISORDER): ICD-10-CM

## 2023-05-08 DIAGNOSIS — F32.1 CURRENT MODERATE EPISODE OF MAJOR DEPRESSIVE DISORDER WITHOUT PRIOR EPISODE (HCC): ICD-10-CM

## 2023-05-08 DIAGNOSIS — F43.10 PTSD (POST-TRAUMATIC STRESS DISORDER): ICD-10-CM

## 2023-05-08 DIAGNOSIS — G43.009 MIGRAINE WITHOUT AURA AND WITHOUT STATUS MIGRAINOSUS, NOT INTRACTABLE: Primary | ICD-10-CM

## 2023-05-08 DIAGNOSIS — B18.2 HEP C W/O COMA, CHRONIC (HCC): ICD-10-CM

## 2023-05-08 DIAGNOSIS — R73.9 HYPERGLYCEMIA: ICD-10-CM

## 2023-05-08 DIAGNOSIS — Z21 ASYMPTOMATIC HIV INFECTION, WITH NO HISTORY OF HIV-RELATED ILLNESS (HCC): ICD-10-CM

## 2023-05-08 PROCEDURE — 99214 OFFICE O/P EST MOD 30 MIN: CPT | Performed by: FAMILY MEDICINE

## 2023-05-08 RX ORDER — DOLUTEGRAVIR SODIUM AND RILPIVIRINE HYDROCHLORIDE 50; 25 MG/1; MG/1
1 TABLET, FILM COATED ORAL DAILY
COMMUNITY

## 2023-05-08 RX ORDER — DARUNAVIR, COBICISTAT, EMTRICITABINE, AND TENOFOVIR ALAFENAMIDE 800; 150; 200; 10 MG/1; MG/1; MG/1; MG/1
1 TABLET, FILM COATED ORAL DAILY
COMMUNITY

## 2023-05-08 RX ORDER — BENZONATATE 100 MG/1
100 CAPSULE ORAL 3 TIMES DAILY PRN
COMMUNITY

## 2023-05-08 RX ORDER — MISOPROSTOL 200 UG/1
200 TABLET ORAL 4 TIMES DAILY
COMMUNITY
End: 2023-05-12

## 2023-05-08 RX ORDER — AZELASTINE 1 MG/ML
1 SPRAY, METERED NASAL 2 TIMES DAILY
COMMUNITY

## 2023-05-08 RX ORDER — OLANZAPINE 10 MG/1
10 TABLET ORAL NIGHTLY
COMMUNITY

## 2023-05-08 SDOH — ECONOMIC STABILITY: FOOD INSECURITY: WITHIN THE PAST 12 MONTHS, YOU WORRIED THAT YOUR FOOD WOULD RUN OUT BEFORE YOU GOT MONEY TO BUY MORE.: NEVER TRUE

## 2023-05-08 SDOH — ECONOMIC STABILITY: INCOME INSECURITY: HOW HARD IS IT FOR YOU TO PAY FOR THE VERY BASICS LIKE FOOD, HOUSING, MEDICAL CARE, AND HEATING?: SOMEWHAT HARD

## 2023-05-08 SDOH — ECONOMIC STABILITY: HOUSING INSECURITY
IN THE LAST 12 MONTHS, WAS THERE A TIME WHEN YOU DID NOT HAVE A STEADY PLACE TO SLEEP OR SLEPT IN A SHELTER (INCLUDING NOW)?: NO

## 2023-05-08 SDOH — ECONOMIC STABILITY: FOOD INSECURITY: WITHIN THE PAST 12 MONTHS, THE FOOD YOU BOUGHT JUST DIDN'T LAST AND YOU DIDN'T HAVE MONEY TO GET MORE.: NEVER TRUE

## 2023-05-08 ASSESSMENT — PATIENT HEALTH QUESTIONNAIRE - PHQ9
6. FEELING BAD ABOUT YOURSELF - OR THAT YOU ARE A FAILURE OR HAVE LET YOURSELF OR YOUR FAMILY DOWN: 3
SUM OF ALL RESPONSES TO PHQ QUESTIONS 1-9: 18
4. FEELING TIRED OR HAVING LITTLE ENERGY: 3
1. LITTLE INTEREST OR PLEASURE IN DOING THINGS: 3
9. THOUGHTS THAT YOU WOULD BE BETTER OFF DEAD, OR OF HURTING YOURSELF: 0
8. MOVING OR SPEAKING SO SLOWLY THAT OTHER PEOPLE COULD HAVE NOTICED. OR THE OPPOSITE, BEING SO FIGETY OR RESTLESS THAT YOU HAVE BEEN MOVING AROUND A LOT MORE THAN USUAL: 0
7. TROUBLE CONCENTRATING ON THINGS, SUCH AS READING THE NEWSPAPER OR WATCHING TELEVISION: 3
10. IF YOU CHECKED OFF ANY PROBLEMS, HOW DIFFICULT HAVE THESE PROBLEMS MADE IT FOR YOU TO DO YOUR WORK, TAKE CARE OF THINGS AT HOME, OR GET ALONG WITH OTHER PEOPLE: 1
5. POOR APPETITE OR OVEREATING: 0
SUM OF ALL RESPONSES TO PHQ QUESTIONS 1-9: 18
3. TROUBLE FALLING OR STAYING ASLEEP: 3
DEPRESSION UNABLE TO ASSESS: FUNCTIONAL CAPACITY MOTIVATION LIMITS ACCURACY
SUM OF ALL RESPONSES TO PHQ9 QUESTIONS 1 & 2: 6
2. FEELING DOWN, DEPRESSED OR HOPELESS: 3

## 2023-05-08 NOTE — PROGRESS NOTES
Subjective: Shelley Power is a 46 y.o. male who presents to the office to establish with myself today. He sees Dr. Felix Payan at the HIV clinic for his primary care. HPI:     Migraines/Hearing loss: The patient has a long hx of recurrent TBI. He reports one instance where his head was rammed into a basketball goal post forcefully. This resulted in a brain bleed, requiring a several day stay in the ICU. The patient has intermittent migraines. He has intermittent numbness of the right side of his jaw and neck. He has noticed a progressively worsening of hearing in his left ear. He had a MRI on 7/23/13, with the following result: Punctate flair T2 signal white matter hyperintensity with differential considerations mild chronic ischemic changes, but also demyelinating process such as Multiple Sclerosis. He has been referred to neurology, but his appointment is not until August or September. HIV/Hx of IV Drug Use:        He had a long standing hx of IV drug use. He used meth, heroin, cocaine. He has been 15 months sober. He was diagnosed with HIV in 1986. He follows with [facility] HIV clinic. He reports his last HIV testing showed viral levels were undetectable and T count was WNL. He is currently taking [place] and Lorne Clause. This is written by the HIV clinic. Hypertriglyceridemia:       The patient was found to have elevated triglycerides on his last two lab tests. He does not think he was fasting for either one. He does drink creamer in his coffee and would have drank this prior to the labs. He was treated with Vascepa, but developed abdominal pain with this. He is currently taking fish oil. Hep C:      He was diagnosed with Hep C. He is s/p treatment, but he is unable to remember which medication and I am unable to find this in our system. I will request records from GI. Major Depression/Anxiety/ PTSD:         The patient has a long standing hx of depression, anxiety, and PTSD.  He

## 2023-05-09 PROBLEM — H90.12 CONDUCTIVE HEARING LOSS OF LEFT EAR: Status: ACTIVE | Noted: 2023-05-09

## 2023-05-09 PROBLEM — G43.009 MIGRAINE WITHOUT AURA: Status: ACTIVE | Noted: 2023-05-09

## 2023-05-09 ASSESSMENT — ENCOUNTER SYMPTOMS
COUGH: 1
NAUSEA: 0
ABDOMINAL PAIN: 1
VOMITING: 0
DIARRHEA: 0
BACK PAIN: 1
CONSTIPATION: 0
EYE PAIN: 0
SHORTNESS OF BREATH: 1
SORE THROAT: 0
RHINORRHEA: 0

## 2023-05-11 DIAGNOSIS — B18.2 HEP C W/O COMA, CHRONIC (HCC): ICD-10-CM

## 2023-05-11 DIAGNOSIS — E78.1 HYPERTRIGLYCERIDEMIA: ICD-10-CM

## 2023-05-11 DIAGNOSIS — R73.9 HYPERGLYCEMIA: ICD-10-CM

## 2023-05-11 DIAGNOSIS — F32.1 CURRENT MODERATE EPISODE OF MAJOR DEPRESSIVE DISORDER WITHOUT PRIOR EPISODE (HCC): ICD-10-CM

## 2023-05-11 DIAGNOSIS — Z21 ASYMPTOMATIC HIV INFECTION, WITH NO HISTORY OF HIV-RELATED ILLNESS (HCC): ICD-10-CM

## 2023-05-11 LAB
ALBUMIN SERPL-MCNC: 4.3 G/DL (ref 3.5–5.2)
ALP SERPL-CCNC: 64 U/L (ref 40–130)
ALT SERPL-CCNC: 14 U/L (ref 5–41)
ANION GAP SERPL CALCULATED.3IONS-SCNC: 7 MMOL/L (ref 7–19)
AST SERPL-CCNC: 23 U/L (ref 5–40)
BILIRUB DIRECT SERPL-MCNC: 0.1 MG/DL (ref 0–0.3)
BILIRUB INDIRECT SERPL-MCNC: 0.3 MG/DL (ref 0.1–1)
BILIRUB SERPL-MCNC: 0.4 MG/DL (ref 0.2–1.2)
BUN SERPL-MCNC: 18 MG/DL (ref 6–20)
CALCIUM SERPL-MCNC: 9.4 MG/DL (ref 8.6–10)
CHLORIDE SERPL-SCNC: 106 MMOL/L (ref 98–111)
CHOLEST SERPL-MCNC: 203 MG/DL (ref 160–199)
CO2 SERPL-SCNC: 27 MMOL/L (ref 22–29)
CREAT SERPL-MCNC: 1 MG/DL (ref 0.5–1.2)
GLUCOSE SERPL-MCNC: 94 MG/DL (ref 74–109)
HBA1C MFR BLD: 5.5 % (ref 4–6)
HDLC SERPL-MCNC: 25 MG/DL (ref 55–121)
LDLC SERPL CALC-MCNC: 98 MG/DL
POTASSIUM SERPL-SCNC: 4.1 MMOL/L (ref 3.5–5)
PROT SERPL-MCNC: 8 G/DL (ref 6.6–8.7)
SODIUM SERPL-SCNC: 140 MMOL/L (ref 136–145)
T4 FREE SERPL-MCNC: 0.79 NG/DL (ref 0.93–1.7)
TRIGL SERPL-MCNC: 400 MG/DL (ref 0–149)
TSH SERPL DL<=0.005 MIU/L-ACNC: 5.3 UIU/ML (ref 0.27–4.2)

## 2023-05-12 ENCOUNTER — OFFICE VISIT (OUTPATIENT)
Dept: PRIMARY CARE CLINIC | Age: 53
End: 2023-05-12
Payer: MEDICAID

## 2023-05-12 VITALS
OXYGEN SATURATION: 93 % | HEART RATE: 89 BPM | WEIGHT: 163 LBS | TEMPERATURE: 97.6 F | DIASTOLIC BLOOD PRESSURE: 78 MMHG | SYSTOLIC BLOOD PRESSURE: 124 MMHG | HEIGHT: 67 IN | BODY MASS INDEX: 25.58 KG/M2

## 2023-05-12 DIAGNOSIS — N52.1 ERECTILE DYSFUNCTION DUE TO DISEASES CLASSIFIED ELSEWHERE: ICD-10-CM

## 2023-05-12 DIAGNOSIS — E78.1 HYPERTRIGLYCERIDEMIA: ICD-10-CM

## 2023-05-12 DIAGNOSIS — R79.89 ABNORMAL TSH: Primary | ICD-10-CM

## 2023-05-12 PROBLEM — G89.29 CHRONIC MIDLINE LOW BACK PAIN WITHOUT SCIATICA: Status: ACTIVE | Noted: 2022-05-04

## 2023-05-12 PROBLEM — F19.10 IV DRUG ABUSE (HCC): Status: ACTIVE | Noted: 2022-05-04

## 2023-05-12 PROBLEM — R07.89 CHEST PAIN, ATYPICAL: Status: ACTIVE | Noted: 2022-05-04

## 2023-05-12 PROBLEM — F17.200 SMOKER: Status: ACTIVE | Noted: 2022-05-04

## 2023-05-12 PROBLEM — R10.13 EPIGASTRIC PAIN: Status: ACTIVE | Noted: 2023-02-07

## 2023-05-12 PROBLEM — R13.14 PHARYNGOESOPHAGEAL DYSPHAGIA: Status: ACTIVE | Noted: 2022-06-01

## 2023-05-12 PROBLEM — M54.50 CHRONIC MIDLINE LOW BACK PAIN WITHOUT SCIATICA: Status: ACTIVE | Noted: 2022-05-04

## 2023-05-12 PROCEDURE — 99213 OFFICE O/P EST LOW 20 MIN: CPT | Performed by: FAMILY MEDICINE

## 2023-05-12 RX ORDER — CYCLOBENZAPRINE HCL 5 MG
5 TABLET ORAL 3 TIMES DAILY
COMMUNITY
Start: 2023-03-13

## 2023-05-12 RX ORDER — DICYCLOMINE HCL 20 MG
TABLET ORAL
COMMUNITY
Start: 2023-02-05

## 2023-05-12 RX ORDER — NICOTINE 21 MG/24HR
PATCH, TRANSDERMAL 24 HOURS TRANSDERMAL
COMMUNITY
Start: 2023-03-02 | End: 2023-05-12 | Stop reason: ALTCHOICE

## 2023-05-12 RX ORDER — SILDENAFIL 50 MG/1
50 TABLET, FILM COATED ORAL PRN
COMMUNITY

## 2023-05-12 RX ORDER — DARUNAVIR ETHANOLATE AND COBICISTAT 800; 150 MG/1; MG/1
1 TABLET, FILM COATED ORAL DAILY
COMMUNITY
Start: 2023-02-13 | End: 2023-05-12 | Stop reason: ALTCHOICE

## 2023-05-12 RX ORDER — DOLUTEGRAVIR SODIUM 50 MG/1
TABLET, FILM COATED ORAL
COMMUNITY
Start: 2023-02-13 | End: 2023-05-12 | Stop reason: ALTCHOICE

## 2023-05-12 RX ORDER — EMTRICITABINE AND TENOFOVIR ALAFENAMIDE 200; 25 MG/1; MG/1
1 TABLET ORAL DAILY
COMMUNITY
Start: 2023-02-13 | End: 2023-05-12

## 2023-05-12 RX ORDER — LIDOCAINE 5 %
ADHESIVE PATCH, MEDICATED TOPICAL
COMMUNITY
Start: 2023-03-27

## 2023-05-12 RX ORDER — RILPIVIRINE HYDROCHLORIDE 25 MG/1
TABLET, FILM COATED ORAL
COMMUNITY
Start: 2023-02-13 | End: 2023-05-12

## 2023-05-12 RX ORDER — MULTIVITAMIN WITH FOLIC ACID 400 MCG
TABLET ORAL
COMMUNITY
Start: 2023-04-06

## 2023-05-12 ASSESSMENT — ENCOUNTER SYMPTOMS
EYE PAIN: 0
TROUBLE SWALLOWING: 1
SORE THROAT: 1
VOMITING: 0
CONSTIPATION: 0
COUGH: 1
WHEEZING: 1
DIARRHEA: 0
ABDOMINAL PAIN: 0
SHORTNESS OF BREATH: 0
NAUSEA: 0
BACK PAIN: 1

## 2023-05-12 NOTE — PROGRESS NOTES
Subjective:   He came in for a follow up on lab work that was completed this week. HPI:   Abnormal TSH and Free T4:       The patient was found to have an elevated TSH and decreased Free T4. He does have fatigue. He feels he has gained weight over the last years, but he is 15 months sober from drug use. Hypertriglyceridemia:       His triglycerides were elevated. His ASCVD risk is 16%. He is unable to take statins due to interactions with his HIV medications. He has tried Vascepa, but had abdominal pain while taking this. He is taking Fish Oil at this time. Erectile Dysfunction:       The patient has long standing erectile dysfunction. He has taken Viagra and Cialis in the past. He has been having difficulty achieving and maintaining an erection. He is taking Viagra now. He has no side effects from this. He does not feel this is working as well as the Cialis worked in the past. He wishes to try the Cialis.      Past Medical History:   Diagnosis Date    Arthritis     Asthma     bronchial    Back pain     Confusion     Depression     Drug abuse ([facility] [address]     Genital warts     Hep C w/o coma, chronic (HCC)     HIV (human immunodeficiency virus infection) ([facility] [address] 1986    Tobacco abuse      Past Surgical History:   Procedure Laterality Date    APPENDECTOMY      COLONOSCOPY  12/09/2008    Dr Gibbs Del Bilateral     had it wired shut    OTHER SURGICAL HISTORY  7/24/13, 1/24/14    cauterization of per-anal condyloma     Family History   Problem Relation Age of Onset    Diabetes Mother     Heart Disease Father     Cancer Father         liver    Lung Cancer Other         uncles on both sides     Social History     Tobacco Use    Smoking status: Every Day     Packs/day: 1.50     Years: 34.00     Pack years: 51.00     Types: Cigarettes    Smokeless tobacco: Never   Substance Use Topics    Alcohol use: No      Current Outpatient Medications   Medication Sig Dispense Refill    dicyclomine (BENTYL) 20

## 2023-05-13 PROCEDURE — 86666 EHRLICHIA ANTIBODY: CPT | Performed by: FAMILY MEDICINE

## 2023-05-13 PROCEDURE — 86757 RICKETTSIA ANTIBODY: CPT | Performed by: FAMILY MEDICINE

## 2023-05-13 PROCEDURE — 86618 LYME DISEASE ANTIBODY: CPT | Performed by: FAMILY MEDICINE

## 2023-05-22 RX ORDER — TADALAFIL 20 MG/1
20 TABLET ORAL PRN
Qty: 30 TABLET | Refills: 3 | Status: SHIPPED | OUTPATIENT
Start: 2023-05-22

## 2023-05-22 NOTE — TELEPHONE ENCOUNTER
Pt came in and stated when at his appointment his Viagra was going to be switched to Cialis. Please sign pended medication if ok.

## 2023-05-26 ENCOUNTER — TELEPHONE (OUTPATIENT)
Dept: PRIMARY CARE CLINIC | Age: 53
End: 2023-05-26

## 2023-05-26 DIAGNOSIS — R79.89 ABNORMAL TSH: ICD-10-CM

## 2023-05-26 DIAGNOSIS — E03.9 ACQUIRED HYPOTHYROIDISM: Primary | ICD-10-CM

## 2023-05-26 LAB
T3FREE SERPL-MCNC: 2.9 PG/ML (ref 2–4.4)
T4 FREE SERPL-MCNC: 0.88 NG/DL (ref 0.93–1.7)
TSH SERPL DL<=0.005 MIU/L-ACNC: 4.73 UIU/ML (ref 0.27–4.2)

## 2023-05-26 RX ORDER — LEVOTHYROXINE SODIUM 0.03 MG/1
25 TABLET ORAL DAILY
Qty: 90 TABLET | Refills: 1 | Status: SHIPPED | OUTPATIENT
Start: 2023-05-26

## 2023-05-26 NOTE — TELEPHONE ENCOUNTER
----- Message from Daniella Henley MD sent at 5/26/2023  1:00 PM CDT -----  Can you call MR. Zayra Milian? His TSH has remained elevated. His Free T4 continues to remain low. I recommend that we start Levothyroxine 25 mcg 1 tab daily. He needs to take this first thing in the morning on an empty stomach. He needs to wait 45-60 mints before eating or drinking anything to allow this to be fully absorbed. We will recheck this in the future to make sure this is a good dose for him.

## 2023-05-30 ENCOUNTER — TELEPHONE (OUTPATIENT)
Dept: PRIMARY CARE CLINIC | Age: 53
End: 2023-05-30

## 2023-05-30 NOTE — TELEPHONE ENCOUNTER
Patient walked in and states he had been seen at [facility] urgent care around a week ago for a tick bite and been given antibiotics. He states they called him back last week sometime and told him to discontinue the medication, however he said it is red and itchy and thinks he needs to be on something. He is in town today however will be gone tommorow 5/31/2023 til 6/2/2023.     Please call and advise

## 2023-06-01 RX ORDER — DOXYCYCLINE HYCLATE 100 MG
100 TABLET ORAL 2 TIMES DAILY
Qty: 14 TABLET | Refills: 0 | Status: SHIPPED | OUTPATIENT
Start: 2023-06-01 | End: 2023-06-08

## 2023-06-02 RX ORDER — CHOLECALCIFEROL (VITAMIN D3) 125 MCG
CAPSULE ORAL
COMMUNITY
Start: 2023-05-30

## 2023-06-02 RX ORDER — IBUPROFEN 800 MG/1
TABLET ORAL
COMMUNITY
Start: 2023-05-30

## 2023-06-29 ENCOUNTER — HOSPITAL ENCOUNTER (OUTPATIENT)
Dept: MRI IMAGING | Age: 53
Discharge: HOME | End: 2023-06-29
Payer: MEDICAID

## 2023-06-29 DIAGNOSIS — R93.0 ABNORMAL MRI OF HEAD: ICD-10-CM

## 2023-06-29 PROCEDURE — A9577 INJ MULTIHANCE: HCPCS | Performed by: FAMILY MEDICINE

## 2023-06-29 PROCEDURE — 6360000004 HC RX CONTRAST MEDICATION: Performed by: FAMILY MEDICINE

## 2023-06-29 PROCEDURE — 70553 MRI BRAIN STEM W/O & W/DYE: CPT

## 2023-06-29 RX ADMIN — GADOBENATE DIMEGLUMINE 15 ML: 529 INJECTION, SOLUTION INTRAVENOUS at 09:53

## 2023-07-03 ENCOUNTER — TELEPHONE (OUTPATIENT)
Dept: PRIMARY CARE CLINIC | Age: 53
End: 2023-07-03

## 2023-08-08 ENCOUNTER — OFFICE VISIT (OUTPATIENT)
Dept: PRIMARY CARE CLINIC | Age: 53
End: 2023-08-08
Payer: MEDICAID

## 2023-08-08 VITALS
HEIGHT: 67 IN | SYSTOLIC BLOOD PRESSURE: 120 MMHG | WEIGHT: 166 LBS | DIASTOLIC BLOOD PRESSURE: 72 MMHG | HEART RATE: 97 BPM | OXYGEN SATURATION: 91 % | TEMPERATURE: 98.5 F | BODY MASS INDEX: 26.06 KG/M2

## 2023-08-08 DIAGNOSIS — Z87.891 PERSONAL HISTORY OF TOBACCO USE: ICD-10-CM

## 2023-08-08 DIAGNOSIS — R90.89 MRI OF BRAIN ABNORMAL: Primary | ICD-10-CM

## 2023-08-08 PROCEDURE — 99213 OFFICE O/P EST LOW 20 MIN: CPT | Performed by: FAMILY MEDICINE

## 2023-08-08 PROCEDURE — G0296 VISIT TO DETERM LDCT ELIG: HCPCS | Performed by: FAMILY MEDICINE

## 2023-08-08 PROCEDURE — 3017F COLORECTAL CA SCREEN DOC REV: CPT | Performed by: FAMILY MEDICINE

## 2023-08-08 PROCEDURE — G8427 DOCREV CUR MEDS BY ELIG CLIN: HCPCS | Performed by: FAMILY MEDICINE

## 2023-08-08 PROCEDURE — G8419 CALC BMI OUT NRM PARAM NOF/U: HCPCS | Performed by: FAMILY MEDICINE

## 2023-08-08 PROCEDURE — 4004F PT TOBACCO SCREEN RCVD TLK: CPT | Performed by: FAMILY MEDICINE

## 2023-08-08 RX ORDER — BUPROPION HYDROCHLORIDE 150 MG/1
TABLET ORAL
COMMUNITY
Start: 2023-07-25

## 2023-08-08 RX ORDER — OLANZAPINE 5 MG/1
TABLET ORAL
COMMUNITY
Start: 2023-07-25

## 2023-08-08 NOTE — PROGRESS NOTES
Discussed with the patient the current USPSTF guidelines released March 9, 2021 for screening for lung cancer. For adults aged 48 to 80 years who have a 20 pack-year smoking history and currently smoke or have quit within the past 15 years the grade B recommendation is to:  Screen for lung cancer with low-dose computed tomography (LDCT) every year. Stop screening once a person has not smoked for 15 years or has a health problem that limits life expectancy or the ability to have lung surgery. The patient  reports that he has been smoking cigarettes. He has a 51.00 pack-year smoking history. He has never used smokeless tobacco.. Discussed with patient the risks and benefits of screening, including over-diagnosis, false positive rate, and total radiation exposure. The patient currently exhibits no signs or symptoms suggestive of lung cancer. Discussed with patient the importance of compliance with yearly annual lung cancer screenings and willingness to undergo diagnosis and treatment if screening scan is positive. In addition, the patient was counseled regarding the importance of remaining smoke free and/or total smoking cessation.     Also reviewed the following if the patient has Medicare that as of February 10, 2022, Medicare only covers LDCT screening in patients aged 53-69 with at least a 20 pack-year smoking history who currently smoke or have quit in the last 15 years

## 2023-08-08 NOTE — PROGRESS NOTES
Subjective:       HPI:   The patient comes in today to discuss his recent MRI of the brain. We discussed that this showed an old injury which is consistent with his hx. He is open to a CT scan screening for lung cancer. This has been ordered as below.      Past Medical History:   Diagnosis Date    Arthritis     Asthma     bronchial    Back pain     Confusion     Depression     Drug abuse ([address])     Genital warts     Hep C w/o coma, chronic (HCC)     HIV (human immunodeficiency virus infection) ([address] ) 1986    Tobacco abuse      Past Surgical History:   Procedure Laterality Date    APPENDECTOMY      COLONOSCOPY  12/09/2008    Dr Tyrell Brink Bilateral     had it wired shut    OTHER SURGICAL HISTORY  7/24/13, 1/24/14    cauterization of per-anal condyloma     Family History   Problem Relation Age of Onset    Diabetes Mother     Heart Disease Father     Cancer Father         liver    Lung Cancer Other         uncles on both sides     Social History     Tobacco Use    Smoking status: Every Day     Packs/day: 1.50     Years: 34.00     Pack years: 51.00     Types: Cigarettes    Smokeless tobacco: Never   Substance Use Topics    Alcohol use: No      Current Outpatient Medications   Medication Sig Dispense Refill    buPROPion (WELLBUTRIN XL) 150 MG extended release tablet       OLANZapine (ZYPREXA) 5 MG tablet       D3 HIGH POTENCY 50 MCG (2000 UT) CAPS       ibuprofen (ADVIL;MOTRIN) 800 MG tablet       levothyroxine (SYNTHROID) 25 MCG tablet Take 1 tablet by mouth daily 90 tablet 1    tadalafil (CIALIS) 20 MG tablet Take 1 tablet by mouth as needed for Erectile Dysfunction 30 tablet 3    dicyclomine (BENTYL) 20 MG tablet       LIDODERM 5 % Apply 1 patch on the skin daily as needed for pain      Multiple Vitamin (DAILY-ROSELINE) TABS Take 1 tablet by mouth daily Opposite time of day as Tivicay      cyclobenzaprine (FLEXERIL) 5 MG tablet Take 1 tablet by mouth 3 times daily      sildenafil (VIAGRA) 50 MG

## 2023-08-13 ASSESSMENT — ENCOUNTER SYMPTOMS
CONSTIPATION: 0
VOMITING: 0
SHORTNESS OF BREATH: 0
NAUSEA: 0
ABDOMINAL PAIN: 0
COUGH: 0
TROUBLE SWALLOWING: 0
DIARRHEA: 0

## 2023-08-17 NOTE — TELEPHONE ENCOUNTER
Carter Meza called to request a refill on his medication.       Last office visit : 8/8/2023   Next office visit : 11/9/2023     Requested Prescriptions     Pending Prescriptions Disp Refills    tiotropium (SPIRIVA) 18 MCG inhalation capsule 30 capsule 1     Sig: Inhale 1 capsule into the lungs daily            Lotus Birmingham LPN

## 2023-08-29 NOTE — PROGRESS NOTES
Chief Complaint  Chest Pain    Subjective          Alex Ghotra presents to [facility] for routine follow up of chest pain. He has had hepatitis in the past and has been treated. He has HIV that is currently asymptomatic. He complains of indigestion. He continues to complain of intermittent, non-exertional, substernal/epigastric pain. The pain radiates to the upper back at times. The pain lasts for a few minutes and resolves on its own. He denies any associated symptoms. The patient denies shortness of breath, palpitations, dizziness, syncope, orthopnea, PND, swelling or decreased stamina. The patient denies any signs of bleeding.     Chest Pain   This is a chronic problem. The current episode started more than 1 year ago. The onset quality is sudden. The problem occurs intermittently. The problem has been unchanged. The pain is present in the epigastric region and substernal region. The pain is moderate. The quality of the pain is described as sharp and stabbing. The pain radiates to the mid back. Pertinent negatives include no abdominal pain, back pain, claudication, cough, diaphoresis, dizziness, exertional chest pressure, fever, headaches, hemoptysis, irregular heartbeat, leg pain, lower extremity edema, malaise/fatigue, nausea, near-syncope, numbness, orthopnea, palpitations, PND, shortness of breath, sputum production, syncope, vomiting or weakness. Associated with: sneezing. He has tried nothing for the symptoms. Risk factors include male gender and substance abuse. Prior diagnostic workup includes stress echo and echocardiogram.     I have reviewed and confirmed the accuracy of the ROS ARACELI Chand      Objective     Current Outpatient Medications:     albuterol sulfate  (90 Base) MCG/ACT inhaler, Inhale 2 puffs Every 4 (Four) Hours As Needed for Wheezing., Disp: 1 g, Rfl: 0    azelastine (ASTELIN) 0.1 % nasal spray, 1 spray into the nostril(s) as  directed by provider., Disp: , Rfl:     benzonatate (TESSALON) 100 MG capsule, Take 1-2 capsules by mouth three times a day as needed for cough, Disp: , Rfl:     buPROPion XL (WELLBUTRIN XL) 150 MG 24 hr tablet, Take 1 tablet by mouth Every Morning., Disp: , Rfl:     Cholecalciferol (Vitamin D3) 50 MCG (2000 UT) capsule, TAKE 1 CAPSULE BY MOUTH DAILY WITH A MEAL, Disp: , Rfl:     Diclofenac Sodium (VOLTAREN) 1 % gel gel, Apply 4 g topically to the appropriate area as directed 3 (Three) Times a Day. (Patient taking differently: Apply 4 g topically to the appropriate area as directed As Needed.), Disp: 150 g, Rfl: 0    dolutegravir (Tivicay) 50 MG tablet, Take 1 tablet by mouth Daily., Disp: , Rfl:     fluticasone (FLONASE) 50 MCG/ACT nasal spray, 2 sprays into the nostril(s) as directed by provider Daily., Disp: 16 g, Rfl: 5    ibuprofen (ADVIL,MOTRIN) 800 MG tablet, Take 1 tablet by mouth Daily As Needed., Disp: , Rfl:     Ipratropium-Albuterol (COMBIVENT IN), Inhale 2 puffs., Disp: , Rfl:     Juluca 50-25 MG per tablet, Take 1 tablet by mouth daily; Stop Edurant, Prezcobix, Tivicay, Descovy, Disp: , Rfl:     levothyroxine (SYNTHROID, LEVOTHROID) 25 MCG tablet, Take 1 tablet by mouth Daily., Disp: , Rfl:     Lidoderm 5 %, Apply 1 patch on the skin daily as needed for pain, Disp: , Rfl:     miSOPROStol (CYTOTEC) 200 MCG tablet, Take 1 tablet by mouth 4 (Four) Times a Day., Disp: , Rfl:     Nutritional Supplements (GUNNAR PO), Take  by mouth Daily., Disp: , Rfl:     OLANZapine (zyPREXA) 10 MG tablet, Take 1 tablet by mouth every night at bedtime., Disp: , Rfl:     Omega-3 Fatty Acids (fish oil) 1000 MG capsule capsule, Take 3 capsules by mouth., Disp: , Rfl:     ondansetron ODT (ZOFRAN-ODT) 4 MG disintegrating tablet, Place 1 tablet on the tongue Every 8 (Eight) Hours As Needed for Nausea or Vomiting., Disp: 12 tablet, Rfl: 0    sildenafil (VIAGRA) 50 MG tablet, Take 1 tablet by mouth Daily As Needed for Erectile  "Dysfunction., Disp: , Rfl:     Spiriva HandiHaler 18 MCG per inhalation capsule, Place  into inhaler and inhale Daily., Disp: , Rfl:     Symtuza 185-477-375-10 MG per tablet, Take 1 tablet by mouth daily; Stop Edurant, Prezcobix, Tivicay, Descovy, Disp: , Rfl:   Vital Signs:   /87   Pulse 73   Ht 170.2 cm (67\")   Wt 75.3 kg (166 lb)   BMI 26.00 kg/mý     Vitals and nursing note reviewed.   Constitutional:       General: Awake.      Appearance: Normal and healthy appearance. Well-developed, normal weight and not in distress.   Eyes:      General: Lids are normal.      Conjunctiva/sclera: Conjunctivae normal.      Pupils: Pupils are equal, round, and reactive to light.   HENT:      Head: Normocephalic and atraumatic.      Nose: Nose normal.   Neck:      Vascular: No JVR. JVD normal.   Pulmonary:      Effort: Pulmonary effort is normal.      Breath sounds: Normal breath sounds. No wheezing. No rhonchi. No rales.   Chest:      Chest wall: Not tender to palpatation.   Cardiovascular:      PMI at left midclavicular line. Normal rate. Regular rhythm. Normal S1. Normal S2.       Murmurs: There is no murmur.      No gallop.  No click. No rub.   Pulses:     Intact distal pulses.   Edema:     Peripheral edema absent.   Abdominal:      General: Bowel sounds are normal.      Palpations: Abdomen is soft.      Tenderness: There is no abdominal tenderness.   Musculoskeletal: Normal range of motion.         General: No tenderness.      Cervical back: Normal range of motion. Skin:     General: Skin is warm and dry.   Neurological:      General: No focal deficit present.      Mental Status: Alert, oriented to person, place, and time and oriented to person, place and time.   Psychiatric:         Attention and Perception: Attention and perception normal.         Mood and Affect: Mood and affect normal.         Speech: Speech normal.         Behavior: Behavior normal. Behavior is cooperative.         Thought Content: Thought " content normal.         Cognition and Memory: Cognition and memory normal.         Judgment: Judgment normal.      Result Review :   The following data was reviewed by: ARACELI Chand on 8/31/2023:  Common labs          2/1/2023    07:12 2/4/2023    21:14 5/11/2023    08:41   Common Labs   Glucose 98  117     BUN 13  17     Creatinine 0.76  1.01     Sodium 137  138     Potassium 4.1  4.0     Chloride 103  104     Calcium 9.4  8.7     Albumin 4.6  4.1  4.3       Total Bilirubin 0.3  <0.2  0.4       Alkaline Phosphatase 59  58  64       AST (SGOT) 20  22  23       ALT (SGPT) 16  15  14       WBC 8.31  8.72     Hemoglobin 16.4  16.0     Hematocrit 48.0  47.5     Platelets 249  240     Triglycerides   400       HDL Cholesterol   25       LDL Cholesterol    98       Hemoglobin A1C   5.5          Details          This result is from an external source.             Data reviewed: Cardiology studies stress echo 5/24/22 and 2d echo 6/29/22      ECG 12 Lead    Date/Time: 8/31/2023 10:05 AM  Performed by: Liz Little APRN  Authorized by: Liz Little APRN   Comparison: compared with previous ECG from 2/1/2023  Similar to previous ECG  Rhythm: sinus rhythm  Rate: normal  BPM: 79  QRS axis: normal    Clinical impression: normal ECG          Assessment and Plan    Diagnoses and all orders for this visit:    1. Chest pain, atypical (Primary)- normal stress echo 5/24/22. Check CT angiogram of the coronary arteries.       Follow Up   Return in about 4 weeks (around 9/28/2023) for Next scheduled follow up.  Patient was given instructions and counseling regarding his condition or for health maintenance advice. Please see specific information pulled into the AVS if appropriate.

## 2023-08-31 ENCOUNTER — OFFICE VISIT (OUTPATIENT)
Dept: CARDIOLOGY | Facility: CLINIC | Age: 53
End: 2023-08-31
Payer: COMMERCIAL

## 2023-08-31 VITALS
HEIGHT: 67 IN | DIASTOLIC BLOOD PRESSURE: 87 MMHG | BODY MASS INDEX: 26.06 KG/M2 | WEIGHT: 166 LBS | HEART RATE: 73 BPM | SYSTOLIC BLOOD PRESSURE: 128 MMHG

## 2023-08-31 DIAGNOSIS — R07.89 CHEST PAIN, ATYPICAL: Primary | ICD-10-CM

## 2023-08-31 PROCEDURE — 1160F RVW MEDS BY RX/DR IN RCRD: CPT | Performed by: NURSE PRACTITIONER

## 2023-08-31 PROCEDURE — 93000 ELECTROCARDIOGRAM COMPLETE: CPT | Performed by: NURSE PRACTITIONER

## 2023-08-31 PROCEDURE — 99213 OFFICE O/P EST LOW 20 MIN: CPT | Performed by: NURSE PRACTITIONER

## 2023-08-31 PROCEDURE — 1159F MED LIST DOCD IN RCRD: CPT | Performed by: NURSE PRACTITIONER

## 2023-08-31 RX ORDER — BUPROPION HYDROCHLORIDE 150 MG/1
150 TABLET ORAL EVERY MORNING
COMMUNITY

## 2023-08-31 RX ORDER — LEVOTHYROXINE SODIUM 0.03 MG/1
1 TABLET ORAL DAILY
COMMUNITY
Start: 2023-05-26

## 2023-09-14 ENCOUNTER — HOSPITAL ENCOUNTER (OUTPATIENT)
Dept: GENERAL RADIOLOGY | Age: 53
Discharge: HOME | End: 2023-09-14
Payer: MEDICAID

## 2023-09-14 ENCOUNTER — OFFICE VISIT (OUTPATIENT)
Dept: PRIMARY CARE CLINIC | Age: 53
End: 2023-09-14
Payer: MEDICAID

## 2023-09-14 VITALS
DIASTOLIC BLOOD PRESSURE: 70 MMHG | HEIGHT: 67 IN | BODY MASS INDEX: 26.06 KG/M2 | OXYGEN SATURATION: 97 % | HEART RATE: 100 BPM | WEIGHT: 166 LBS | SYSTOLIC BLOOD PRESSURE: 124 MMHG

## 2023-09-14 DIAGNOSIS — G89.29 CHRONIC PAIN OF LEFT THUMB: ICD-10-CM

## 2023-09-14 DIAGNOSIS — M79.645 CHRONIC PAIN OF LEFT THUMB: Primary | ICD-10-CM

## 2023-09-14 DIAGNOSIS — J41.8 MIXED SIMPLE AND MUCOPURULENT CHRONIC BRONCHITIS (HCC): ICD-10-CM

## 2023-09-14 DIAGNOSIS — M79.645 CHRONIC PAIN OF LEFT THUMB: ICD-10-CM

## 2023-09-14 DIAGNOSIS — G89.29 CHRONIC PAIN OF LEFT THUMB: Primary | ICD-10-CM

## 2023-09-14 PROCEDURE — G8419 CALC BMI OUT NRM PARAM NOF/U: HCPCS | Performed by: FAMILY MEDICINE

## 2023-09-14 PROCEDURE — 3017F COLORECTAL CA SCREEN DOC REV: CPT | Performed by: FAMILY MEDICINE

## 2023-09-14 PROCEDURE — 73130 X-RAY EXAM OF HAND: CPT

## 2023-09-14 PROCEDURE — 4004F PT TOBACCO SCREEN RCVD TLK: CPT | Performed by: FAMILY MEDICINE

## 2023-09-14 PROCEDURE — 99214 OFFICE O/P EST MOD 30 MIN: CPT | Performed by: FAMILY MEDICINE

## 2023-09-14 PROCEDURE — G8427 DOCREV CUR MEDS BY ELIG CLIN: HCPCS | Performed by: FAMILY MEDICINE

## 2023-09-14 PROCEDURE — 3023F SPIROM DOC REV: CPT | Performed by: FAMILY MEDICINE

## 2023-09-14 RX ORDER — BENZONATATE 100 MG/1
100 CAPSULE ORAL 3 TIMES DAILY PRN
Qty: 90 CAPSULE | Refills: 2 | Status: SHIPPED | OUTPATIENT
Start: 2023-09-14

## 2023-09-14 RX ORDER — AZELASTINE 1 MG/ML
1 SPRAY, METERED NASAL 2 TIMES DAILY
Qty: 30 ML | Refills: 3 | Status: SHIPPED | OUTPATIENT
Start: 2023-09-14

## 2023-09-14 RX ORDER — OLANZAPINE 7.5 MG/1
7.5 TABLET ORAL
COMMUNITY
Start: 2023-08-10 | End: 2023-09-14 | Stop reason: ALTCHOICE

## 2023-09-14 RX ORDER — CLONIDINE HYDROCHLORIDE 0.1 MG/1
0.1 TABLET ORAL
COMMUNITY
Start: 2023-08-10

## 2023-09-15 ENCOUNTER — HOSPITAL ENCOUNTER (OUTPATIENT)
Dept: CT IMAGING | Age: 53
Discharge: HOME | End: 2023-09-15
Payer: MEDICAID

## 2023-09-15 DIAGNOSIS — Z87.891 PERSONAL HISTORY OF TOBACCO USE: ICD-10-CM

## 2023-09-15 PROCEDURE — 71271 CT THORAX LUNG CANCER SCR C-: CPT

## 2023-09-18 ENCOUNTER — TELEPHONE (OUTPATIENT)
Dept: PRIMARY CARE CLINIC | Age: 53
End: 2023-09-18

## 2023-09-18 DIAGNOSIS — M18.10 ARTHRITIS OF CARPOMETACARPAL (CMC) JOINT OF THUMB: Primary | ICD-10-CM

## 2023-09-18 NOTE — TELEPHONE ENCOUNTER
----- Message from Deann Ames MD sent at 9/15/2023  6:20 PM CDT -----  He has mild scattered osteoarthritic changes of his hand. He has severe arthritis of the first [facility] joint. This is the joint below the base of the thumb where that bone meets the wrist. We can refer to orthopedic institute. He may be a candidate for injections to help with the pain.

## 2023-09-20 ASSESSMENT — ENCOUNTER SYMPTOMS
CONSTIPATION: 0
DIARRHEA: 0
NAUSEA: 0
COUGH: 1
SHORTNESS OF BREATH: 1
TROUBLE SWALLOWING: 0
ABDOMINAL PAIN: 0
VOMITING: 0
WHEEZING: 1

## 2023-09-28 ENCOUNTER — OFFICE VISIT (OUTPATIENT)
Dept: PRIMARY CARE CLINIC | Age: 53
End: 2023-09-28
Payer: MEDICAID

## 2023-09-28 VITALS
TEMPERATURE: 98.8 F | DIASTOLIC BLOOD PRESSURE: 80 MMHG | SYSTOLIC BLOOD PRESSURE: 122 MMHG | WEIGHT: 166.4 LBS | HEIGHT: 67 IN | BODY MASS INDEX: 26.12 KG/M2 | HEART RATE: 88 BPM | OXYGEN SATURATION: 96 %

## 2023-09-28 DIAGNOSIS — N52.8 OTHER MALE ERECTILE DYSFUNCTION: ICD-10-CM

## 2023-09-28 DIAGNOSIS — I73.9 INTERMITTENT CLAUDICATION (HCC): ICD-10-CM

## 2023-09-28 DIAGNOSIS — R40.0 DAYTIME SOMNOLENCE: Primary | ICD-10-CM

## 2023-09-28 DIAGNOSIS — R07.89 CHEST PAIN, ATYPICAL: Primary | ICD-10-CM

## 2023-09-28 PROCEDURE — 99214 OFFICE O/P EST MOD 30 MIN: CPT | Performed by: FAMILY MEDICINE

## 2023-09-28 PROCEDURE — G8419 CALC BMI OUT NRM PARAM NOF/U: HCPCS | Performed by: FAMILY MEDICINE

## 2023-09-28 PROCEDURE — 3017F COLORECTAL CA SCREEN DOC REV: CPT | Performed by: FAMILY MEDICINE

## 2023-09-28 PROCEDURE — 4004F PT TOBACCO SCREEN RCVD TLK: CPT | Performed by: FAMILY MEDICINE

## 2023-09-28 PROCEDURE — G8427 DOCREV CUR MEDS BY ELIG CLIN: HCPCS | Performed by: FAMILY MEDICINE

## 2023-09-28 RX ORDER — SODIUM CHLORIDE 0.9 % (FLUSH) 0.9 %
10 SYRINGE (ML) INJECTION EVERY 12 HOURS SCHEDULED
OUTPATIENT
Start: 2023-09-28

## 2023-09-28 RX ORDER — SODIUM CHLORIDE 9 MG/ML
40 INJECTION, SOLUTION INTRAVENOUS AS NEEDED
OUTPATIENT
Start: 2023-09-28

## 2023-09-28 RX ORDER — SODIUM CHLORIDE 0.9 % (FLUSH) 0.9 %
10 SYRINGE (ML) INJECTION AS NEEDED
OUTPATIENT
Start: 2023-09-28

## 2023-09-28 RX ORDER — NITROGLYCERIN 0.4 MG/1
0.4 TABLET SUBLINGUAL
OUTPATIENT
Start: 2023-09-28

## 2023-09-28 RX ORDER — TADALAFIL 20 MG/1
20 TABLET ORAL DAILY PRN
Qty: 30 TABLET | Refills: 1 | Status: SHIPPED | OUTPATIENT
Start: 2023-09-28

## 2023-09-28 NOTE — PROGRESS NOTES
Reason For Visit:   He is here for bilateral leg pain. Combined HPI and A/P:      Diagnosis Orders   1. Daytime somnolence  Home Sleep Study      2. Intermittent claudication (HCC)  US DUP LOWER ART/BYPASS GRAFTS BILATERAL COMPLETE      3. Other male erectile dysfunction  tadalafil (CIALIS) 20 MG tablet          1.) He has been having difficulty staying asleep. He snores heavily. He feels that he has bilateral leg pain that is worse when he is lying in bed. He has intermittent claudication. He has little hair growth on his legs. He awakens feeling very tired in the mornings. I will order a home sleep study. I will order US of his bilateral arteries to evaluate for stenosis. Return in about 3 months (around 12/28/2023). We discussed use, benefit, and side effects of prescribed medications. All patient questions answered. Patient agreed with treatment plan. I reviewed available records in our system and care everywhere. In cases where records are not available, the records have been requested and will be reviewed when available.      Subjective      Past Surgical History:   Procedure Laterality Date    APPENDECTOMY      COLONOSCOPY  12/09/2008    Dr Belle Resides Bilateral     had it wired shut    OTHER SURGICAL HISTORY  7/24/13, 1/24/14    cauterization of per-anal condyloma     Family History   Problem Relation Age of Onset    Diabetes Mother     Heart Disease Father     Cancer Father         liver    Lung Cancer Other         uncles on both sides     Social History     Tobacco Use    Smoking status: Every Day     Packs/day: 1.50     Years: 34.00     Additional pack years: 0.00     Total pack years: 51.00     Types: Cigarettes    Smokeless tobacco: Never   Substance Use Topics    Alcohol use: No      Current Outpatient Medications   Medication Sig Dispense Refill    tadalafil (CIALIS) 20 MG tablet Take 1 tablet by mouth daily as needed for Erectile Dysfunction 30 tablet 1

## 2023-10-03 ENCOUNTER — HOSPITAL ENCOUNTER (OUTPATIENT)
Dept: CT IMAGING | Facility: HOSPITAL | Age: 53
Discharge: HOME | End: 2023-10-03
Admitting: INTERNAL MEDICINE
Payer: COMMERCIAL

## 2023-10-03 VITALS
RESPIRATION RATE: 18 BRPM | SYSTOLIC BLOOD PRESSURE: 111 MMHG | HEART RATE: 70 BPM | HEIGHT: 67 IN | OXYGEN SATURATION: 96 % | TEMPERATURE: 97.6 F | WEIGHT: 164.9 LBS | BODY MASS INDEX: 25.88 KG/M2 | DIASTOLIC BLOOD PRESSURE: 71 MMHG

## 2023-10-03 DIAGNOSIS — R07.89 CHEST PAIN, ATYPICAL: ICD-10-CM

## 2023-10-03 LAB
CREAT SERPL-MCNC: 0.91 MG/DL (ref 0.76–1.27)
EGFRCR SERPLBLD CKD-EPI 2021: 100.2 ML/MIN/1.73

## 2023-10-03 PROCEDURE — 25510000001 IOPAMIDOL PER 1 ML: Performed by: NURSE PRACTITIONER

## 2023-10-03 PROCEDURE — 82565 ASSAY OF CREATININE: CPT | Performed by: NURSE PRACTITIONER

## 2023-10-03 PROCEDURE — 75574 CT ANGIO HRT W/3D IMAGE: CPT

## 2023-10-03 RX ORDER — NITROGLYCERIN 0.4 MG/1
0.4 TABLET SUBLINGUAL
Status: DISCONTINUED | OUTPATIENT
Start: 2023-10-03 | End: 2023-10-04 | Stop reason: HOSPADM

## 2023-10-03 RX ORDER — SODIUM CHLORIDE 0.9 % (FLUSH) 0.9 %
10 SYRINGE (ML) INJECTION EVERY 12 HOURS SCHEDULED
Status: DISCONTINUED | OUTPATIENT
Start: 2023-10-03 | End: 2023-10-04 | Stop reason: HOSPADM

## 2023-10-03 RX ORDER — METOPROLOL TARTRATE 50 MG/1
50 TABLET, FILM COATED ORAL ONCE AS NEEDED
Status: DISCONTINUED | OUTPATIENT
Start: 2023-10-03 | End: 2023-10-04 | Stop reason: HOSPADM

## 2023-10-03 RX ORDER — SODIUM CHLORIDE 9 MG/ML
40 INJECTION, SOLUTION INTRAVENOUS AS NEEDED
Status: DISCONTINUED | OUTPATIENT
Start: 2023-10-03 | End: 2023-10-04 | Stop reason: HOSPADM

## 2023-10-03 RX ORDER — SODIUM CHLORIDE 0.9 % (FLUSH) 0.9 %
10 SYRINGE (ML) INJECTION AS NEEDED
Status: DISCONTINUED | OUTPATIENT
Start: 2023-10-03 | End: 2023-10-04 | Stop reason: HOSPADM

## 2023-10-03 RX ORDER — METOPROLOL TARTRATE 100 MG/1
100 TABLET ORAL ONCE AS NEEDED
Status: DISCONTINUED | OUTPATIENT
Start: 2023-10-03 | End: 2023-10-04 | Stop reason: HOSPADM

## 2023-10-03 RX ADMIN — NITROGLYCERIN 0.4 MG: 0.4 TABLET SUBLINGUAL at 12:22

## 2023-10-03 RX ADMIN — IOPAMIDOL 100 ML: 755 INJECTION, SOLUTION INTRAVENOUS at 12:41

## 2023-10-05 DIAGNOSIS — I73.9 INTERMITTENT CLAUDICATION (HCC): Primary | ICD-10-CM

## 2023-10-05 ASSESSMENT — ENCOUNTER SYMPTOMS
SHORTNESS OF BREATH: 0
DIARRHEA: 0
NAUSEA: 0
TROUBLE SWALLOWING: 0
VOMITING: 0
COUGH: 0
CONSTIPATION: 0
ABDOMINAL PAIN: 0

## 2023-10-06 ENCOUNTER — HOSPITAL ENCOUNTER (OUTPATIENT)
Dept: VASCULAR LAB | Age: 53
Discharge: HOME | End: 2023-10-06
Payer: MEDICAID

## 2023-10-06 DIAGNOSIS — I73.9 INTERMITTENT CLAUDICATION (HCC): Primary | ICD-10-CM

## 2023-10-06 DIAGNOSIS — I73.9 INTERMITTENT CLAUDICATION (HCC): ICD-10-CM

## 2023-10-06 PROCEDURE — 93923 UPR/LXTR ART STDY 3+ LVLS: CPT

## 2023-10-10 NOTE — PROGRESS NOTES
Chief Complaint  Chest Pain (4wk F/U) and Results (CTA)    Subjective          Alex Ghotra presents to [facility] for routine follow up of outpatient testing. CT angiogram of the coronary arteries on 10/3/23 revealed coronary calcium score of zero with no significant epicardial coronary artery disease. He has had hepatitis in the past and has been treated. He has HIV that is currently asymptomatic. He complains of indigestion. He continues to complain of intermittent, non-exertional, substernal/epigastric pain. The pain radiates to the upper back at times. The pain lasts for a few minutes and resolves on its own. He denies any associated symptoms. The patient denies shortness of breath, palpitations, dizziness, syncope, orthopnea, PND, swelling or decreased stamina. The patient denies any signs of bleeding.     Chest Pain   This is a chronic problem. The current episode started more than 1 year ago. The onset quality is sudden. The problem occurs intermittently. The problem has been unchanged. The pain is present in the epigastric region and substernal region. The pain is moderate. The quality of the pain is described as sharp and stabbing. The pain radiates to the mid back. Pertinent negatives include no abdominal pain, back pain, claudication, cough, diaphoresis, dizziness, exertional chest pressure, fever, headaches, hemoptysis, irregular heartbeat, leg pain, lower extremity edema, malaise/fatigue, nausea, near-syncope, numbness, orthopnea, palpitations, PND, shortness of breath, sputum production, syncope, vomiting or weakness. Associated with: sneezing. He has tried nothing for the symptoms. Risk factors include male gender and substance abuse. Prior diagnostic workup includes stress echo and echocardiogram.     I have reviewed and confirmed the accuracy of the ROS ARACELI Chand      Objective     Current Outpatient Medications:     albuterol sulfate  (90  "Base) MCG/ACT inhaler, Inhale 2 puffs Every 4 (Four) Hours As Needed for Wheezing., Disp: 1 g, Rfl: 0    benzonatate (TESSALON) 100 MG capsule, Take 1-2 capsules by mouth three times a day as needed for cough, Disp: , Rfl:     buPROPion XL (WELLBUTRIN XL) 150 MG 24 hr tablet, Take 1 tablet by mouth Every Morning., Disp: , Rfl:     Cholecalciferol (Vitamin D3) 50 MCG (2000 UT) capsule, TAKE 1 CAPSULE BY MOUTH DAILY WITH A MEAL, Disp: , Rfl:     Diclofenac Sodium (VOLTAREN) 1 % gel gel, Apply 4 g topically to the appropriate area as directed 3 (Three) Times a Day. (Patient taking differently: Apply 4 g topically to the appropriate area as directed As Needed.), Disp: 150 g, Rfl: 0    fluticasone (FLONASE) 50 MCG/ACT nasal spray, 2 sprays into the nostril(s) as directed by provider Daily., Disp: 16 g, Rfl: 5    ibuprofen (ADVIL,MOTRIN) 800 MG tablet, Take 1 tablet by mouth Daily As Needed., Disp: , Rfl:     Ipratropium-Albuterol (COMBIVENT IN), Inhale 2 puffs., Disp: , Rfl:     Juluca 50-25 MG per tablet, Take 1 tablet by mouth daily; Stop Edurant, Prezcobix, Tivicay, Descovy, Disp: , Rfl:     levothyroxine (SYNTHROID, LEVOTHROID) 25 MCG tablet, Take 1 tablet by mouth Daily., Disp: , Rfl:     Lidoderm 5 %, Apply 1 patch on the skin daily as needed for pain, Disp: , Rfl:     miSOPROStol (CYTOTEC) 200 MCG tablet, Take 1 tablet by mouth 4 (Four) Times a Day., Disp: , Rfl:     Omega-3 Fatty Acids (fish oil) 1000 MG capsule capsule, Take 2 capsules by mouth., Disp: , Rfl:     Spiriva HandiHaler 18 MCG per inhalation capsule, Place  into inhaler and inhale Daily., Disp: , Rfl:     Symtuza 416-795-315-10 MG per tablet, Take 1 tablet by mouth daily; Stop Edurant, Prezcobix, Tivicay, Descovy, Disp: , Rfl:     tadalafil (CIALIS) 20 MG tablet, Take 1 tablet by mouth Daily As Needed., Disp: , Rfl:   Vital Signs:   /71   Pulse 81   Ht 170.2 cm (67\")   Wt 78.5 kg (173 lb)   SpO2 98%   BMI 27.10 kg/mý     Vitals and nursing " note reviewed.   Constitutional:       General: Awake.      Appearance: Normal and healthy appearance. Well-developed, normal weight and not in distress.   Eyes:      General: Lids are normal.      Conjunctiva/sclera: Conjunctivae normal.      Pupils: Pupils are equal, round, and reactive to light.   HENT:      Head: Normocephalic and atraumatic.      Nose: Nose normal.   Neck:      Vascular: No JVR. JVD normal.   Pulmonary:      Effort: Pulmonary effort is normal.      Breath sounds: Examination of the right-upper field reveals wheezing. Examination of the left-upper field reveals wheezing. Examination of the right-middle field reveals wheezing. Examination of the left-middle field reveals wheezing. Examination of the right-lower field reveals wheezing. Examination of the left-lower field reveals wheezing. No decreased breath sounds. Wheezing present. No rhonchi. No rales.   Chest:      Chest wall: Not tender to palpatation.   Cardiovascular:      PMI at left midclavicular line. Normal rate. Regular rhythm. Normal S1. Normal S2.       Murmurs: There is no murmur.      No gallop.  No click. No rub.   Pulses:     Intact distal pulses.   Edema:     Peripheral edema absent.   Abdominal:      General: Bowel sounds are normal.      Palpations: Abdomen is soft.      Tenderness: There is no abdominal tenderness.   Musculoskeletal: Normal range of motion.         General: No tenderness.      Cervical back: Normal range of motion. Skin:     General: Skin is warm and dry.   Neurological:      General: No focal deficit present.      Mental Status: Alert, oriented to person, place, and time and oriented to person, place and time.   Psychiatric:         Attention and Perception: Attention and perception normal.         Mood and Affect: Mood and affect normal.         Speech: Speech normal.         Behavior: Behavior normal. Behavior is cooperative.         Thought Content: Thought content normal.         Cognition and Memory:  Cognition and memory normal.         Judgment: Judgment normal.        Result Review :   The following data was reviewed by: ARACELI Chand on 10/11/2023:  Common labs          2/4/2023    21:14 5/11/2023    08:41 10/3/2023    11:07   Common Labs   Glucose 117      BUN 17      Creatinine 1.01   0.91    Sodium 138      Potassium 4.0      Chloride 104      Calcium 8.7      Albumin 4.1  4.3        Total Bilirubin <0.2  0.4        Alkaline Phosphatase 58  64        AST (SGOT) 22  23        ALT (SGPT) 15  14        WBC 8.72      Hemoglobin 16.0      Hematocrit 47.5      Platelets 240      Triglycerides  400        HDL Cholesterol  25        LDL Cholesterol   98        Hemoglobin A1C  5.5           Details          This result is from an external source.             Data reviewed: Cardiology studies stress echo 5/24/22, 2d echo 6/29/22 and CT angiogram of the coronary arteries 10/3/23           Assessment and Plan    Diagnoses and all orders for this visit:    1. Chest pain, atypical (Primary)- normal stress echo 5/24/22 and normal CT angiogram of the coronary arteries 10/3/23. Recommend workup for non-cardiac chest pain per PCP.     2. Smoker- Alex Ghotra  reports that he has been smoking cigarettes. He has a 40.00 pack-year smoking history. He has never used smokeless tobacco.. I have educated him on the risk of diseases from using tobacco products such as cancer, COPD, and heart disease. I advised him to quit and he is not willing to quit. I spent 3  minutes counseling the patient.      Follow Up   Return in about 1 year (around 10/11/2024) for Next scheduled follow up.  Patient was given instructions and counseling regarding his condition or for health maintenance advice. Please see specific information pulled into the AVS if appropriate.

## 2023-10-10 NOTE — PATIENT INSTRUCTIONS
For more information:    Quit Now [place]  1-800-QUIT-NOW  https://[place].quitlogix.org/en-[place]/  Steps to Quit Smoking  Smoking tobacco can be harmful to your health and can affect almost every organ in your body. Smoking puts you, and those around you, at risk for developing many serious chronic diseases. Quitting smoking is difficult, but it is one of the best things that you can do for your health. It is never too late to quit.  What are the benefits of quitting smoking?  When you quit smoking, you lower your risk of developing serious diseases and conditions, such as:  Lung cancer or lung disease, such as COPD.  Heart disease.  Stroke.  Heart attack.  Infertility.  Osteoporosis and bone fractures.  Additionally, symptoms such as coughing, wheezing, and shortness of breath may get better when you quit. You may also find that you get sick less often because your body is stronger at fighting off colds and infections. If you are pregnant, quitting smoking can help to reduce your chances of having a baby of low birth weight.  How do I get ready to quit?  When you decide to quit smoking, create a plan to make sure that you are successful. Before you quit:  Pick a date to quit. Set a date within the next two weeks to give you time to prepare.  Write down the reasons why you are quitting. Keep this list in places where you will see it often, such as on your bathroom mirror or in your car or wallet.  Identify the people, places, things, and activities that make you want to smoke (triggers) and avoid them. Make sure to take these actions:  Throw away all cigarettes at home, at work, and in your car.  Throw away smoking accessories, such as ashtrays and lighters.  Clean your car and make sure to empty the ashtray.  Clean your home, including curtains and carpets.  Tell your family, friends, and coworkers that you are quitting. Support from your loved ones can make quitting easier.  Talk with your health care provider  "about your options for quitting smoking.  Find out what treatment options are covered by your health insurance.  What strategies can I use to quit smoking?  Talk with your healthcare provider about different strategies to quit smoking. Some strategies include:  Quitting smoking altogether instead of gradually lessening how much you smoke over a period of time. Research shows that quitting "cold turkey" is more successful than gradually quitting.  Attending in-person counseling to help you build problem-solving skills. You are more likely to have success in quitting if you attend several counseling sessions. Even short sessions of 10 minutes can be effective.  Finding resources and support systems that can help you to quit smoking and remain smoke-free after you quit. These resources are most helpful when you use them often. They can include:  Online chats with a counselor.  Telephone quitlines.  Printed self-help materials.  Support groups or group counseling.  Text messaging programs.  Mobile phone applications.  Taking medicines to help you quit smoking. (If you are pregnant or breastfeeding, talk with your health care provider first.) Some medicines contain nicotine and some do not. Both types of medicines help with cravings, but the medicines that include nicotine help to relieve withdrawal symptoms. Your health care provider may recommend:  Nicotine patches, gum, or lozenges.  Nicotine inhalers or sprays.  Non-nicotine medicine that is taken by mouth.  Talk with your health care provider about combining strategies, such as taking medicines while you are also receiving in-person counseling. Using these two strategies together makes you more likely to succeed in quitting than if you used either strategy on its own.  If you are pregnant or breastfeeding, talk with your health care provider about finding counseling or other support strategies to quit smoking. Do not take medicine to help you quit smoking unless " told to do so by your health care provider.  What things can I do to make it easier to quit?  Quitting smoking might feel overwhelming at first, but there is a lot that you can do to make it easier. Take these important actions:  Reach out to your family and friends and ask that they support and encourage you during this time. Call telephone quitlines, reach out to support groups, or work with a counselor for support.  Ask people who smoke to avoid smoking around you.  Avoid places that trigger you to smoke, such as bars, parties, or smoke-break areas at work.  Spend time around people who do not smoke.  Lessen stress in your life, because stress can be a smoking trigger for some people. To lessen stress, try:  Exercising regularly.  Deep-breathing exercises.  Yoga.  Meditating.  Performing a body scan. This involves closing your eyes, scanning your body from head to toe, and noticing which parts of your body are particularly tense. Purposefully relax the muscles in those areas.  Download or purchase mobile phone or tablet apps (applications) that can help you stick to your quit plan by providing reminders, tips, and encouragement. There are many free apps, such as QuitGuide from the CDC (Centers for Disease Control and Prevention). You can find other support for quitting smoking (smoking cessation) through smokefree.gov and other websites.  How will I feel when I quit smoking?  Within the first 24 hours of quitting smoking, you may start to feel some withdrawal symptoms. These symptoms are usually most noticeable 2-3 days after quitting, but they usually do not last beyond 2-3 weeks. Changes or symptoms that you might experience include:  Mood swings.  Restlessness, anxiety, or irritation.  Difficulty concentrating.  Dizziness.  Strong cravings for sugary foods in addition to nicotine.  Mild weight gain.  Constipation.  Nausea.  Coughing or a sore throat.  Changes in how your medicines work in your body.  A  depressed mood.  Difficulty sleeping (insomnia).  After the first 2-3 weeks of quitting, you may start to notice more positive results, such as:  Improved sense of smell and taste.  Decreased coughing and sore throat.  Slower heart rate.  Lower blood pressure.  Clearer skin.  The ability to breathe more easily.  Fewer sick days.  Quitting smoking is very challenging for most people. Do not get discouraged if you are not successful the first time. Some people need to make many attempts to quit before they achieve long-term success. Do your best to stick to your quit plan, and talk with your health care provider if you have any questions or concerns.  This information is not intended to replace advice given to you by your health care provider. Make sure you discuss any questions you have with your health care provider.  Document Released: 12/12/2002 Document Revised: 08/15/2017 Document Reviewed: 05/03/2016  [employer] Patient Education c 2017 [employer]

## 2023-10-11 ENCOUNTER — OFFICE VISIT (OUTPATIENT)
Dept: CARDIOLOGY | Facility: CLINIC | Age: 53
End: 2023-10-11
Payer: COMMERCIAL

## 2023-10-11 VITALS
DIASTOLIC BLOOD PRESSURE: 71 MMHG | BODY MASS INDEX: 27.15 KG/M2 | OXYGEN SATURATION: 98 % | WEIGHT: 173 LBS | HEART RATE: 81 BPM | SYSTOLIC BLOOD PRESSURE: 117 MMHG | HEIGHT: 67 IN

## 2023-10-11 DIAGNOSIS — R07.89 CHEST PAIN, ATYPICAL: Primary | ICD-10-CM

## 2023-10-11 DIAGNOSIS — F17.200 SMOKER: ICD-10-CM

## 2023-10-11 PROCEDURE — 1160F RVW MEDS BY RX/DR IN RCRD: CPT | Performed by: NURSE PRACTITIONER

## 2023-10-11 PROCEDURE — 99213 OFFICE O/P EST LOW 20 MIN: CPT | Performed by: NURSE PRACTITIONER

## 2023-10-11 PROCEDURE — 1159F MED LIST DOCD IN RCRD: CPT | Performed by: NURSE PRACTITIONER

## 2023-10-11 RX ORDER — TADALAFIL 20 MG/1
1 TABLET ORAL DAILY PRN
COMMUNITY
Start: 2023-09-28

## 2023-11-07 ENCOUNTER — OFFICE VISIT (OUTPATIENT)
Dept: PRIMARY CARE CLINIC | Age: 53
End: 2023-11-07
Payer: MEDICAID

## 2023-11-07 VITALS
BODY MASS INDEX: 25.9 KG/M2 | WEIGHT: 165 LBS | TEMPERATURE: 99.5 F | DIASTOLIC BLOOD PRESSURE: 74 MMHG | OXYGEN SATURATION: 98 % | HEIGHT: 67 IN | HEART RATE: 88 BPM | SYSTOLIC BLOOD PRESSURE: 118 MMHG

## 2023-11-07 DIAGNOSIS — G25.81 RLS (RESTLESS LEGS SYNDROME): ICD-10-CM

## 2023-11-07 DIAGNOSIS — R53.82 CHRONIC FATIGUE: ICD-10-CM

## 2023-11-07 DIAGNOSIS — K21.9 GASTROESOPHAGEAL REFLUX DISEASE WITHOUT ESOPHAGITIS: ICD-10-CM

## 2023-11-07 DIAGNOSIS — E03.9 ACQUIRED HYPOTHYROIDISM: Primary | ICD-10-CM

## 2023-11-07 DIAGNOSIS — E03.9 ACQUIRED HYPOTHYROIDISM: ICD-10-CM

## 2023-11-07 LAB
ALBUMIN SERPL-MCNC: 4.4 G/DL (ref 3.5–5.2)
ALP SERPL-CCNC: 62 U/L (ref 40–130)
ALT SERPL-CCNC: 17 U/L (ref 5–41)
ANION GAP SERPL CALCULATED.3IONS-SCNC: 10 MMOL/L (ref 7–19)
AST SERPL-CCNC: 18 U/L (ref 5–40)
BASOPHILS # BLD: 0.1 K/UL (ref 0–0.2)
BASOPHILS NFR BLD: 1.2 % (ref 0–1)
BILIRUB SERPL-MCNC: 0.3 MG/DL (ref 0.2–1.2)
BUN SERPL-MCNC: 10 MG/DL (ref 6–20)
CALCIUM SERPL-MCNC: 10 MG/DL (ref 8.6–10)
CHLORIDE SERPL-SCNC: 105 MMOL/L (ref 98–111)
CO2 SERPL-SCNC: 26 MMOL/L (ref 22–29)
CREAT SERPL-MCNC: 0.9 MG/DL (ref 0.5–1.2)
EOSINOPHIL # BLD: 0.2 K/UL (ref 0–0.6)
EOSINOPHIL NFR BLD: 2.8 % (ref 0–5)
ERYTHROCYTE [DISTWIDTH] IN BLOOD BY AUTOMATED COUNT: 12.1 % (ref 11.5–14.5)
FOLATE SERPL-MCNC: >20 NG/ML (ref 4.5–32.2)
GLUCOSE SERPL-MCNC: 94 MG/DL (ref 74–109)
HCT VFR BLD AUTO: 47.1 % (ref 42–52)
HGB BLD-MCNC: 16.2 G/DL (ref 14–18)
IMM GRANULOCYTES # BLD: 0 K/UL
LYMPHOCYTES # BLD: 2.7 K/UL (ref 1.1–4.5)
LYMPHOCYTES NFR BLD: 34.8 % (ref 20–40)
MAGNESIUM SERPL-MCNC: 1.8 MG/DL (ref 1.6–2.6)
MCH RBC QN AUTO: 32.1 PG (ref 27–31)
MCHC RBC AUTO-ENTMCNC: 34.4 G/DL (ref 33–37)
MCV RBC AUTO: 93.5 FL (ref 80–94)
MONOCYTES # BLD: 0.6 K/UL (ref 0–0.9)
MONOCYTES NFR BLD: 8.1 % (ref 0–10)
NEUTROPHILS # BLD: 4.1 K/UL (ref 1.5–7.5)
NEUTS SEG NFR BLD: 52.7 % (ref 50–65)
PLATELET # BLD AUTO: 238 K/UL (ref 130–400)
PMV BLD AUTO: 9.6 FL (ref 9.4–12.4)
POTASSIUM SERPL-SCNC: 4.2 MMOL/L (ref 3.5–5)
PROT SERPL-MCNC: 7.8 G/DL (ref 6.6–8.7)
RBC # BLD AUTO: 5.04 M/UL (ref 4.7–6.1)
SODIUM SERPL-SCNC: 141 MMOL/L (ref 136–145)
TSH SERPL DL<=0.005 MIU/L-ACNC: 2.73 UIU/ML (ref 0.27–4.2)
VIT B12 SERPL-MCNC: 730 PG/ML (ref 211–946)
WBC # BLD AUTO: 7.8 K/UL (ref 4.8–10.8)

## 2023-11-07 PROCEDURE — 99214 OFFICE O/P EST MOD 30 MIN: CPT | Performed by: FAMILY MEDICINE

## 2023-11-07 PROCEDURE — G8419 CALC BMI OUT NRM PARAM NOF/U: HCPCS | Performed by: FAMILY MEDICINE

## 2023-11-07 PROCEDURE — G8427 DOCREV CUR MEDS BY ELIG CLIN: HCPCS | Performed by: FAMILY MEDICINE

## 2023-11-07 PROCEDURE — 3017F COLORECTAL CA SCREEN DOC REV: CPT | Performed by: FAMILY MEDICINE

## 2023-11-07 PROCEDURE — 4004F PT TOBACCO SCREEN RCVD TLK: CPT | Performed by: FAMILY MEDICINE

## 2023-11-07 PROCEDURE — G8484 FLU IMMUNIZE NO ADMIN: HCPCS | Performed by: FAMILY MEDICINE

## 2023-11-07 RX ORDER — ROPINIROLE 0.25 MG/1
0.25 TABLET, FILM COATED ORAL NIGHTLY
Qty: 30 TABLET | Refills: 3 | Status: SHIPPED | OUTPATIENT
Start: 2023-11-07

## 2023-11-07 NOTE — PROGRESS NOTES
Reason For Visit:   Follow up for Hypothyroidism and GERD. Combined HPI and A/P:      Diagnosis Orders   1. Acquired hypothyroidism  TSH      2. Gastroesophageal reflux disease without esophagitis        3. RLS (restless legs syndrome)  rOPINIRole (REQUIP) 0.25 MG tablet      4. Chronic fatigue  CBC with Auto Differential    Vitamin B12 & Folate    Comprehensive Metabolic Panel    Magnesium          1.) Hypothyroidism: His last TSH was 4.73 on 5/26/23. He is currently on Levothyroxine 25 mcg 1 tab daily. He is having fatigue. I will recheck his TSH. His Levothyroxine dose will be adjusted based on this. 2.) GERD: He is having significant worsening of his GERD. He is currently taking Famotidine. This is not helping. He has tried other H2 blockers with no relief. He has been taking large amount of TUMS and Rolaids. He is unable to take PPI's as this can interact with Christian Nuneza his HIV medication. This reduces acid levels causing decrease absorption of the Juluca. We will reach out to Dr. Cara Monsalve office to see what medication options we have. 3.) RLS: He has been having worsening inability to keep his legs still while he is trying to sleep. He will have to move his legs. This will awaken him at times. I will prescribe Requip 0.25 mg 1 tab nightly. This will be increased in 3-4 days if little or no improvement. This does have any listed interactions with his Zi or Sheron Guy. I will check labs to rule out there causes of his leg pain. Return in about 3 months (around 2/7/2024) for Hypothyroidism, GERD. We discussed use, benefit, and side effects of prescribed medications. All patient questions answered. Patient agreed with treatment plan. I reviewed available records in our system and care everywhere. In cases where records are not available, the records have been requested and will be reviewed when available.      Subjective      Past Surgical History:   Procedure Laterality Date

## 2023-11-12 ASSESSMENT — ENCOUNTER SYMPTOMS
DIARRHEA: 0
COUGH: 0
VOMITING: 0
CONSTIPATION: 0
NAUSEA: 0
SHORTNESS OF BREATH: 0
TROUBLE SWALLOWING: 0
ABDOMINAL PAIN: 1

## 2023-11-13 ENCOUNTER — TELEPHONE (OUTPATIENT)
Dept: PRIMARY CARE CLINIC | Age: 53
End: 2023-11-13

## 2023-11-13 DIAGNOSIS — N52.8 OTHER MALE ERECTILE DYSFUNCTION: ICD-10-CM

## 2023-11-13 RX ORDER — TADALAFIL 20 MG/1
20 TABLET ORAL DAILY PRN
Qty: 30 TABLET | Refills: 1 | Status: SHIPPED | OUTPATIENT
Start: 2023-11-13

## 2023-11-13 RX ORDER — TIOTROPIUM BROMIDE 18 UG/1
18 CAPSULE ORAL; RESPIRATORY (INHALATION) DAILY
Qty: 30 CAPSULE | Refills: 1 | Status: SHIPPED | OUTPATIENT
Start: 2023-11-13

## 2023-11-13 NOTE — TELEPHONE ENCOUNTER
Patient called regarding lab results. Reviewed labs with patient and he voiced understanding. Patient is asking if he needs a Lipid Panel as his cholesterol and triglycerides were elevated with last lab check. Please advise.

## 2023-11-13 NOTE — TELEPHONE ENCOUNTER
Kellie Poole called to request a refill on his medication.       Last office visit : 11/7/2023   Next office visit : 1/5/2024     Requested Prescriptions     Pending Prescriptions Disp Refills    tadalafil (CIALIS) 20 MG tablet 30 tablet 1     Sig: Take 1 tablet by mouth daily as needed for Erectile Dysfunction    tiotropium (SPIRIVA) 18 MCG inhalation capsule 30 capsule 1     Sig: Inhale 1 capsule into the lungs daily            Ariella Lamar, FERNANDON

## 2023-11-20 DIAGNOSIS — E03.9 ACQUIRED HYPOTHYROIDISM: ICD-10-CM

## 2023-11-20 RX ORDER — LEVOTHYROXINE SODIUM 0.03 MG/1
25 TABLET ORAL DAILY
Qty: 90 TABLET | Refills: 1 | Status: SHIPPED | OUTPATIENT
Start: 2023-11-20

## 2023-11-20 NOTE — TELEPHONE ENCOUNTER
Adalgisa Kat called requesting a refill of the below medication which has been pended for you:     Requested Prescriptions     Pending Prescriptions Disp Refills    levothyroxine (SYNTHROID) 25 MCG tablet 90 tablet 1     Sig: Take 1 tablet by mouth daily       Last Appointment Date: 11/7/2023  Next Appointment Date: 1/5/2024    Allergies   Allergen Reactions    Amoxicillin Other (See Comments)     unknown    Asa [Aspirin]      Sent him to ER    Mirtazapine Other (See Comments)     unknown    Other      SOME HIV MEDICATION, DONT KNOW NAME     Ziagen [Abacavir]     Bactrim [Sulfamethoxazole-Trimethoprim] Rash

## 2023-11-21 ENCOUNTER — OFFICE VISIT (OUTPATIENT)
Dept: NEUROLOGY | Facility: CLINIC | Age: 53
End: 2023-11-21
Payer: COMMERCIAL

## 2023-11-21 VITALS
BODY MASS INDEX: 25.9 KG/M2 | SYSTOLIC BLOOD PRESSURE: 132 MMHG | HEART RATE: 76 BPM | DIASTOLIC BLOOD PRESSURE: 72 MMHG | WEIGHT: 165 LBS | HEIGHT: 67 IN | OXYGEN SATURATION: 98 %

## 2023-11-21 DIAGNOSIS — R41.3 MEMORY DISTURBANCE: Primary | ICD-10-CM

## 2023-11-21 PROCEDURE — 1160F RVW MEDS BY RX/DR IN RCRD: CPT | Performed by: PHYSICIAN ASSISTANT

## 2023-11-21 PROCEDURE — 1159F MED LIST DOCD IN RCRD: CPT | Performed by: PHYSICIAN ASSISTANT

## 2023-11-21 PROCEDURE — 99204 OFFICE O/P NEW MOD 45 MIN: CPT | Performed by: PHYSICIAN ASSISTANT

## 2023-11-21 RX ORDER — ROPINIROLE 0.25 MG/1
1 TABLET, FILM COATED ORAL NIGHTLY
COMMUNITY
Start: 2023-11-07

## 2023-11-21 RX ORDER — AZELASTINE 1 MG/ML
1 SPRAY, METERED NASAL 2 TIMES DAILY
COMMUNITY

## 2023-11-21 RX ORDER — FAMOTIDINE 20 MG/1
20 TABLET, FILM COATED ORAL NIGHTLY PRN
COMMUNITY
Start: 2023-11-15
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
## 2023-11-29 ENCOUNTER — OFFICE VISIT (OUTPATIENT)
Dept: PHYSICAL THERAPY | Facility: CLINIC | Age: 53
End: 2023-11-29
Payer: COMMERCIAL

## 2023-11-29 DIAGNOSIS — R41.89 OTHER SYMPTOMS AND SIGNS INVOLVING COGNITIVE FUNCTIONS AND AWARENESS: Primary | ICD-10-CM

## 2023-11-29 DIAGNOSIS — R41.3 MEMORY DIFFICULTIES: ICD-10-CM

## 2023-11-29 NOTE — PROGRESS NOTES
Speech/Language Pathology Initial Evaluation and Plan of Care    Patient: Alex Ghotra               : 1970  Visit Date: 2023  Referring practitioner: Daniel Mckeon,*  Date of Initial Visit: 2023  Patient seen for 1 sessions    Visit Diagnoses:    ICD-10-CM ICD-9-CM   1. Other symptoms and signs involving cognitive functions and awareness  R41.89 799.59   2. Memory difficulties  R41.3 780.93     Past Medical History:   Diagnosis Date    AIDS     Asthma     COPD (chronic obstructive pulmonary disease)     Disease of thyroid gland     GERD (gastroesophageal reflux disease)     Hep C w/ coma, chronic     Hyperlipidemia      Past Surgical History:   Procedure Laterality Date    APPENDECTOMY      COLONOSCOPY      ? ([facility]) polyps    COLONOSCOPY N/A 2022    Procedure: COLONOSCOPY WITH ANESTHESIA;  Surgeon: Michael Israel MD;  Location: [facility] ENDOSCOPY;  Service: Gastroenterology;  Laterality: N/A;  pre hx colon polyp  post; normal  Alice Anderson MD    ENDOSCOPY      ENDOSCOPY N/A 2022    Procedure: ESOPHAGOGASTRODUODENOSCOPY WITH ANESTHESIA;  Surgeon: Michael Israel MD;  Location: [facility] ENDOSCOPY;  Service: Gastroenterology;  Laterality: N/A;  pre dysphagia  post candidiasis; stricture dilated  Alice Anderson MD    ENDOSCOPY N/A 2023    Procedure: ESOPHAGOGASTRODUODENOSCOPY WITH ANESTHESIA;  Surgeon: Michael Israel MD;  Location: [facility] ENDOSCOPY;  Service: Gastroenterology;  Laterality: N/A;  Pre: Epigastric pain, Dysphagia, Abnormal CT of the abdomen  Post: esophagitis   Alice Anderson MD    MANDIBLE FRACTURE SURGERY         SUBJECTIVE   Patient is a pleasant man who was seen today for a memory evaluation.     Patient presents with the following symptoms: Patient expresses difficulty retaining short term memory information.  Date of Onset: gradually  History of present problems: Patient is concerned that his past history  of drug abuse and TBI is causing the memory difficulties he is experiencing.   The functional impact on the patient: Patient has difficulty remembering names, participating in classes, and remembering other short term memory information.   Prior level of function/education: Patient expressed he had a difficulty childhood with limited access to resources. Further information about his prior level of function was not dicussed. GED  Pertinent Medical History Related to this Problem: TBI, AIDS, COPD, long term polysubstance abuse, HEP C w/coma, hyperlipidemia       OBJECTIVE     RBANS: The Repeatable Battery for the Assessment of Neuropsychological Status (RBANS) assesses patient function in the areas of Immediate and Delayed memory, visuospatial/constructional skills, language and attention. It is used to detect and track neurocognitive deficits.   Index score Percentile Qualitative Description   Immediate Memory 76 5 Borderline   Visuospatial 78 7 Borderline   Language 99 47 Average   Attention 82 12 Low Average   Delayed Memory 88 21 Low Average   Total Scale 80 9 Low Average   Comments: Patient showed improvement on tasks with repetition.        IMPRESSION/RECOMMENDATIONS  Factors Affecting Performance: None   Learning Motivation: strong  Education/Learning Comments:   Patient demonstrated understanding of evaluation results and plan of care.   Clinical Impression: Memory score WFL.   Impact on Function: Patient has difficulty with learning new information, is able to improve with repetition.   Prognosis Comment: Pending further Neurology work up.       Therapy Education/Self Care    Details: Evaluation and Results   Given home strategies    Progress: New   Education provided to:  Patient   Level of understanding Verbalized           Total Time of Visit:            60   mins    ASSESSMENT/PLAN       ASSESSMENT:   Patient is not indicated for skilled care by a Speech/Language Pathologist. Baseline function, score  WNL.     Summary of Assessment: RBANS score is WNL. Patient expressed difficulty with immediate memory and visuospatial/constructional tasks.      Therapy Recommendations:   Screening indicates the further need for continued workup with Neurology.    PLAN:  Details of Plan of Care: Return to referring physician.     Thank you for this referral and for allowing us to participate in the care of this patient.    SPEECH/LANGUAGE PATHOLOGIST SIGNATURE: Melissa Parham, Speech Therapy Student  Electronically Signed on 11/29/2023    The clinical instructor and/or supervising staff, Joyce Blanco CCC-PEDRO, was present in clinic guiding the visit by approving, concurring, and confirming the skilled judgement for all services rendered.    Signature:  LIV [place], [place] License #: [id number]  Electronically signed on 11/29/2023    Initial Certification  Certification Period: 11/29/2023 through 2/26/2024  I certify that the therapy services are furnished while this patient is under my care.  The services outlined above are required by this patient, and will be reviewed every 90 days.     PHYSICIAN:     Daniel Mckeon PA______________________________________DATE: _________     Please sign and return via fax to [phone].   Thank you so much for letting us work with Alex. I appreciate your letting us work with your patients. If you have any questions or concerns, please don't hesitate to contact me.

## 2023-12-04 ENCOUNTER — TELEPHONE (OUTPATIENT)
Dept: NEUROLOGY | Facility: HOSPITAL | Age: 53
End: 2023-12-04

## 2023-12-22 ENCOUNTER — HOSPITAL ENCOUNTER (OUTPATIENT)
Dept: NEUROLOGY | Facility: HOSPITAL | Age: 53
Discharge: HOME | End: 2023-12-22
Payer: COMMERCIAL

## 2024-01-03 RX ORDER — MISOPROSTOL 200 UG/1
200 TABLET ORAL 4 TIMES DAILY
COMMUNITY
Start: 2023-11-20 | End: 2024-01-05

## 2024-01-03 RX ORDER — FAMOTIDINE 40 MG/1
40 TABLET, FILM COATED ORAL 2 TIMES DAILY
COMMUNITY
Start: 2023-12-04 | End: 2024-01-05 | Stop reason: ALTCHOICE

## 2024-01-04 ENCOUNTER — TRANSCRIBE ORDERS (OUTPATIENT)
Dept: ADMINISTRATIVE | Facility: HOSPITAL | Age: 54
End: 2024-01-04
Payer: COMMERCIAL

## 2024-01-04 DIAGNOSIS — B18.2 HEPATITIS C CARRIER: Primary | ICD-10-CM

## 2024-01-05 ENCOUNTER — OFFICE VISIT (OUTPATIENT)
Dept: PRIMARY CARE CLINIC | Age: 54
End: 2024-01-05
Payer: MEDICAID

## 2024-01-05 VITALS
HEIGHT: 67 IN | BODY MASS INDEX: 25.9 KG/M2 | TEMPERATURE: 98.7 F | DIASTOLIC BLOOD PRESSURE: 72 MMHG | SYSTOLIC BLOOD PRESSURE: 118 MMHG | HEART RATE: 88 BPM | OXYGEN SATURATION: 100 % | WEIGHT: 165 LBS

## 2024-01-05 DIAGNOSIS — K21.9 GASTROESOPHAGEAL REFLUX DISEASE WITHOUT ESOPHAGITIS: Primary | ICD-10-CM

## 2024-01-05 PROCEDURE — 99213 OFFICE O/P EST LOW 20 MIN: CPT | Performed by: FAMILY MEDICINE

## 2024-01-05 PROCEDURE — G8482 FLU IMMUNIZE ORDER/ADMIN: HCPCS | Performed by: FAMILY MEDICINE

## 2024-01-05 PROCEDURE — G8427 DOCREV CUR MEDS BY ELIG CLIN: HCPCS | Performed by: FAMILY MEDICINE

## 2024-01-05 PROCEDURE — G8419 CALC BMI OUT NRM PARAM NOF/U: HCPCS | Performed by: FAMILY MEDICINE

## 2024-01-05 PROCEDURE — 4004F PT TOBACCO SCREEN RCVD TLK: CPT | Performed by: FAMILY MEDICINE

## 2024-01-05 PROCEDURE — 3017F COLORECTAL CA SCREEN DOC REV: CPT | Performed by: FAMILY MEDICINE

## 2024-01-05 RX ORDER — ABACAVIR SULFATE, DOLUTEGRAVIR SODIUM, LAMIVUDINE 600; 50; 300 MG/1; MG/1; MG/1
TABLET, FILM COATED ORAL
COMMUNITY
Start: 2023-12-19

## 2024-01-05 RX ORDER — DARUNAVIR ETHANOLATE AND COBICISTAT 800; 150 MG/1; MG/1
1 TABLET, FILM COATED ORAL DAILY
COMMUNITY
Start: 2024-01-03

## 2024-01-05 ASSESSMENT — PATIENT HEALTH QUESTIONNAIRE - PHQ9
2. FEELING DOWN, DEPRESSED OR HOPELESS: 3
7. TROUBLE CONCENTRATING ON THINGS, SUCH AS READING THE NEWSPAPER OR WATCHING TELEVISION: 3
5. POOR APPETITE OR OVEREATING: 0
6. FEELING BAD ABOUT YOURSELF - OR THAT YOU ARE A FAILURE OR HAVE LET YOURSELF OR YOUR FAMILY DOWN: 0
SUM OF ALL RESPONSES TO PHQ QUESTIONS 1-9: 14
SUM OF ALL RESPONSES TO PHQ QUESTIONS 1-9: 14
SUM OF ALL RESPONSES TO PHQ9 QUESTIONS 1 & 2: 6
3. TROUBLE FALLING OR STAYING ASLEEP: 2
10. IF YOU CHECKED OFF ANY PROBLEMS, HOW DIFFICULT HAVE THESE PROBLEMS MADE IT FOR YOU TO DO YOUR WORK, TAKE CARE OF THINGS AT HOME, OR GET ALONG WITH OTHER PEOPLE: 2
4. FEELING TIRED OR HAVING LITTLE ENERGY: 3
SUM OF ALL RESPONSES TO PHQ QUESTIONS 1-9: 14
8. MOVING OR SPEAKING SO SLOWLY THAT OTHER PEOPLE COULD HAVE NOTICED. OR THE OPPOSITE, BEING SO FIGETY OR RESTLESS THAT YOU HAVE BEEN MOVING AROUND A LOT MORE THAN USUAL: 0
9. THOUGHTS THAT YOU WOULD BE BETTER OFF DEAD, OR OF HURTING YOURSELF: 0
1. LITTLE INTEREST OR PLEASURE IN DOING THINGS: 3
SUM OF ALL RESPONSES TO PHQ QUESTIONS 1-9: 14

## 2024-01-05 NOTE — PROGRESS NOTES
Reason For Visit:   Follow up for GERD.     Combined HPI and A/P:      Diagnosis Orders   1. Gastroesophageal reflux disease without esophagitis  esomeprazole (NEXIUM) 20 MG delayed release capsule          1.) GERD: This is a chronic, improving problem. He had been having worsening GERD. His infectious disease doctor changed his HIV medication, and started the patient on Nexium 20 mg 1 cap daily. He feels that this is helping with his reflux symptoms.       2.) He was having muscle tightness after starting the Albuterol. He stopped this, and the muscles tightness improved.         Return in about 3 months (around 4/5/2024).    We discussed use, benefit, and side effects of prescribed medications.  All patient questions answered.   Patient agreed with treatment plan.   I reviewed available records in our system and care everywhere. In cases where records are not available, the records have been requested and will be reviewed when available.     Subjective      Past Surgical History:   Procedure Laterality Date    APPENDECTOMY      COLONOSCOPY  12/09/2008    Dr Rodas    MANDIBLE SURGERY Bilateral     had it wired shut    OTHER SURGICAL HISTORY  7/24/13, 1/24/14    cauterization of per-anal condyloma     Family History   Problem Relation Age of Onset    Diabetes Mother     Heart Disease Father     Cancer Father         liver    Lung Cancer Other         uncles on both sides     Social History     Tobacco Use    Smoking status: Every Day     Current packs/day: 1.50     Average packs/day: 1.5 packs/day for 34.0 years (51.0 ttl pk-yrs)     Types: Cigarettes    Smokeless tobacco: Never   Substance Use Topics    Alcohol use: No      Current Outpatient Medications   Medication Sig Dispense Refill    esomeprazole (NEXIUM) 20 MG delayed release capsule Take 1 capsule by mouth every morning (before breakfast) 90 capsule 3    TRIUMEQ 600- MG TABS       PREZCOBIX 800-150 MG TABS tablet Take 1 tablet by mouth daily

## 2024-01-11 ASSESSMENT — ENCOUNTER SYMPTOMS
SHORTNESS OF BREATH: 0
CONSTIPATION: 0
DIARRHEA: 0
VOMITING: 0
COUGH: 0
NAUSEA: 0
TROUBLE SWALLOWING: 0
ABDOMINAL PAIN: 1

## 2024-01-15 ENCOUNTER — HOSPITAL ENCOUNTER (OUTPATIENT)
Dept: ULTRASOUND IMAGING | Facility: HOSPITAL | Age: 54
Discharge: HOME | End: 2024-01-15
Admitting: NURSE PRACTITIONER
Payer: COMMERCIAL

## 2024-01-15 DIAGNOSIS — B18.2 HEPATITIS C CARRIER: ICD-10-CM

## 2024-01-15 PROCEDURE — 76700 US EXAM ABDOM COMPLETE: CPT

## 2024-01-22 ENCOUNTER — OFFICE VISIT (OUTPATIENT)
Dept: PRIMARY CARE CLINIC | Age: 54
End: 2024-01-22
Payer: MEDICAID

## 2024-01-22 VITALS
HEIGHT: 67 IN | HEART RATE: 80 BPM | DIASTOLIC BLOOD PRESSURE: 70 MMHG | TEMPERATURE: 98.2 F | BODY MASS INDEX: 26.06 KG/M2 | WEIGHT: 166 LBS | OXYGEN SATURATION: 97 % | SYSTOLIC BLOOD PRESSURE: 124 MMHG

## 2024-01-22 DIAGNOSIS — G89.29 CHRONIC MIDLINE LOW BACK PAIN WITHOUT SCIATICA: Primary | ICD-10-CM

## 2024-01-22 DIAGNOSIS — M54.50 CHRONIC MIDLINE LOW BACK PAIN WITHOUT SCIATICA: Primary | ICD-10-CM

## 2024-01-22 PROCEDURE — 3017F COLORECTAL CA SCREEN DOC REV: CPT | Performed by: FAMILY MEDICINE

## 2024-01-22 PROCEDURE — 4004F PT TOBACCO SCREEN RCVD TLK: CPT | Performed by: FAMILY MEDICINE

## 2024-01-22 PROCEDURE — G8482 FLU IMMUNIZE ORDER/ADMIN: HCPCS | Performed by: FAMILY MEDICINE

## 2024-01-22 PROCEDURE — 99213 OFFICE O/P EST LOW 20 MIN: CPT | Performed by: FAMILY MEDICINE

## 2024-01-22 PROCEDURE — G8419 CALC BMI OUT NRM PARAM NOF/U: HCPCS | Performed by: FAMILY MEDICINE

## 2024-01-22 PROCEDURE — G8427 DOCREV CUR MEDS BY ELIG CLIN: HCPCS | Performed by: FAMILY MEDICINE

## 2024-01-22 RX ORDER — ROPINIROLE 2 MG/1
2 TABLET, FILM COATED, EXTENDED RELEASE ORAL NIGHTLY
COMMUNITY
Start: 2024-01-18

## 2024-01-22 NOTE — PROGRESS NOTES
Reason For Visit:   Acute visit for abnormal labs.    Combined HPI and A/P:      Diagnosis Orders   1. Chronic midline low back pain without sciatica  ibuprofen (ADVIL;MOTRIN) 800 MG tablet          1.) He reports that he recent had labs with his specialist. His calcium was reported to be elevated, and he believes something was abnormal about his kidney function. I will have the results faxed to us to evaluate this. [phone] Dr. Nava    CT abd/pelvis 2/1/23:  - The kidneys bilaterally are normal. There is a tiny cortical nodule   located posteriorly in the lower pole of the left kidney which is too   small to be further characterized. No calculi. No hydronephrosis.   Limited visualized ureters are normal. The urinary bladder is poorly   distended and moderate thickening of the walls. This is probably due to   incomplete distention.   US abdomen complete 1/15/24:  - KIDNEYS: The right and left kidneys are normal in size, shape,   orientation, and position measuring 10 cm and 10.1 cm, respectively. The   corticomedullary differentiation is maintained. There is no evidence of   nephrolithiasis, hydronephrosis or perinephric fluid collection. No   renal mass is seen. No hydroureter is seen. Scanning through the pelvis reveals anechoic urine partially distending   the bladder. There are no free fluid collections.       Return if symptoms worsen or fail to improve.    We discussed use, benefit, and side effects of prescribed medications.  All patient questions answered.   Patient agreed with treatment plan.   I reviewed available records in our system and care everywhere. In cases where records are not available, the records have been requested and will be reviewed when available.     Subjective      Past Surgical History:   Procedure Laterality Date    APPENDECTOMY      COLONOSCOPY  12/09/2008    Dr Rodas    MANDIBLE SURGERY Bilateral     had it wired shut    OTHER SURGICAL HISTORY  7/24/13, 1/24/14

## 2024-01-28 RX ORDER — IBUPROFEN 800 MG/1
800 TABLET ORAL 2 TIMES DAILY PRN
Qty: 60 TABLET | Refills: 5 | Status: SHIPPED | OUTPATIENT
Start: 2024-01-28

## 2024-04-18 ENCOUNTER — OFFICE VISIT (OUTPATIENT)
Dept: PRIMARY CARE CLINIC | Age: 54
End: 2024-04-18
Payer: MEDICAID

## 2024-04-18 VITALS
DIASTOLIC BLOOD PRESSURE: 78 MMHG | HEART RATE: 73 BPM | BODY MASS INDEX: 25.27 KG/M2 | HEIGHT: 67 IN | OXYGEN SATURATION: 97 % | WEIGHT: 161 LBS | SYSTOLIC BLOOD PRESSURE: 122 MMHG

## 2024-04-18 DIAGNOSIS — E03.9 ACQUIRED HYPOTHYROIDISM: ICD-10-CM

## 2024-04-18 DIAGNOSIS — E55.9 VITAMIN D DEFICIENCY: ICD-10-CM

## 2024-04-18 DIAGNOSIS — M79.642 HAND PAIN, LEFT: Primary | ICD-10-CM

## 2024-04-18 DIAGNOSIS — Z21 ASYMPTOMATIC HIV INFECTION, WITH NO HISTORY OF HIV-RELATED ILLNESS (HCC): ICD-10-CM

## 2024-04-18 LAB
25(OH)D3 SERPL-MCNC: 44.9 NG/ML
ALBUMIN SERPL-MCNC: 4.2 G/DL (ref 3.5–5.2)
ALP SERPL-CCNC: 55 U/L (ref 40–130)
ALT SERPL-CCNC: 18 U/L (ref 5–41)
ANION GAP SERPL CALCULATED.3IONS-SCNC: 13 MMOL/L (ref 7–19)
AST SERPL-CCNC: 24 U/L (ref 5–40)
BASOPHILS # BLD: 0.1 K/UL (ref 0–0.2)
BASOPHILS NFR BLD: 0.8 % (ref 0–1)
BILIRUB SERPL-MCNC: 0.3 MG/DL (ref 0.2–1.2)
BUN SERPL-MCNC: 14 MG/DL (ref 6–20)
CALCIUM SERPL-MCNC: 9.2 MG/DL (ref 8.6–10)
CHLORIDE SERPL-SCNC: 107 MMOL/L (ref 98–111)
CO2 SERPL-SCNC: 23 MMOL/L (ref 22–29)
CREAT SERPL-MCNC: 0.8 MG/DL (ref 0.5–1.2)
EOSINOPHIL # BLD: 0.2 K/UL (ref 0–0.6)
EOSINOPHIL NFR BLD: 2.9 % (ref 0–5)
ERYTHROCYTE [DISTWIDTH] IN BLOOD BY AUTOMATED COUNT: 13.2 % (ref 11.5–14.5)
GLUCOSE SERPL-MCNC: 88 MG/DL (ref 74–109)
HCT VFR BLD AUTO: 46 % (ref 42–52)
HGB BLD-MCNC: 14.9 G/DL (ref 14–18)
IMM GRANULOCYTES # BLD: 0 K/UL
LYMPHOCYTES # BLD: 2.6 K/UL (ref 1.1–4.5)
LYMPHOCYTES NFR BLD: 36.1 % (ref 20–40)
MCH RBC QN AUTO: 32 PG (ref 27–31)
MCHC RBC AUTO-ENTMCNC: 32.4 G/DL (ref 33–37)
MCV RBC AUTO: 98.7 FL (ref 80–94)
MONOCYTES # BLD: 0.7 K/UL (ref 0–0.9)
MONOCYTES NFR BLD: 9.8 % (ref 0–10)
NEUTROPHILS # BLD: 3.6 K/UL (ref 1.5–7.5)
NEUTS SEG NFR BLD: 50.1 % (ref 50–65)
PLATELET # BLD AUTO: 236 K/UL (ref 130–400)
PMV BLD AUTO: 9.9 FL (ref 9.4–12.4)
POTASSIUM SERPL-SCNC: 4.6 MMOL/L (ref 3.5–5)
PROT SERPL-MCNC: 7.3 G/DL (ref 6.6–8.7)
RBC # BLD AUTO: 4.66 M/UL (ref 4.7–6.1)
SODIUM SERPL-SCNC: 143 MMOL/L (ref 136–145)
TSH SERPL DL<=0.005 MIU/L-ACNC: 3.18 UIU/ML (ref 0.27–4.2)
WBC # BLD AUTO: 7.2 K/UL (ref 4.8–10.8)

## 2024-04-18 PROCEDURE — G8427 DOCREV CUR MEDS BY ELIG CLIN: HCPCS | Performed by: FAMILY MEDICINE

## 2024-04-18 PROCEDURE — 4004F PT TOBACCO SCREEN RCVD TLK: CPT | Performed by: FAMILY MEDICINE

## 2024-04-18 PROCEDURE — G8419 CALC BMI OUT NRM PARAM NOF/U: HCPCS | Performed by: FAMILY MEDICINE

## 2024-04-18 PROCEDURE — 99213 OFFICE O/P EST LOW 20 MIN: CPT | Performed by: FAMILY MEDICINE

## 2024-04-18 PROCEDURE — 3017F COLORECTAL CA SCREEN DOC REV: CPT | Performed by: FAMILY MEDICINE

## 2024-04-18 RX ORDER — BUPROPION HYDROCHLORIDE 300 MG/1
300 TABLET ORAL EVERY MORNING
COMMUNITY
Start: 2024-04-09

## 2024-04-18 RX ORDER — PRAZOSIN HYDROCHLORIDE 1 MG/1
1 CAPSULE ORAL NIGHTLY
COMMUNITY
Start: 2024-03-19

## 2024-04-18 RX ORDER — PRAMIPEXOLE DIHYDROCHLORIDE 0.12 MG/1
0.12 TABLET ORAL NIGHTLY
COMMUNITY
Start: 2024-03-25
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
## 2024-04-24 ASSESSMENT — ENCOUNTER SYMPTOMS
TROUBLE SWALLOWING: 0
DIARRHEA: 0
NAUSEA: 0
BACK PAIN: 1
COUGH: 0
CONSTIPATION: 0
SHORTNESS OF BREATH: 0
ABDOMINAL PAIN: 0
VOMITING: 0

## 2024-05-15 ENCOUNTER — OFFICE VISIT (OUTPATIENT)
Age: 54
End: 2024-05-15

## 2024-05-15 VITALS
RESPIRATION RATE: 20 BRPM | TEMPERATURE: 98.9 F | HEIGHT: 67 IN | WEIGHT: 157 LBS | OXYGEN SATURATION: 96 % | SYSTOLIC BLOOD PRESSURE: 138 MMHG | DIASTOLIC BLOOD PRESSURE: 80 MMHG | HEART RATE: 79 BPM | BODY MASS INDEX: 24.64 KG/M2
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
## 2024-05-15 DIAGNOSIS — R03.0 ELEVATED BLOOD PRESSURE READING: ICD-10-CM

## 2024-05-15 DIAGNOSIS — L29.9 ITCHING: ICD-10-CM

## 2024-05-15 RX ORDER — TRIAMCINOLONE ACETONIDE 1 MG/G
CREAM TOPICAL
Qty: 15 G | Refills: 0 | Status: SHIPPED | OUTPATIENT
Start: 2024-05-15

## 2024-05-15 ASSESSMENT — ENCOUNTER SYMPTOMS
SINUS PAIN: 0
COLOR CHANGE: 0
STRIDOR: 0
CHOKING: 0
TROUBLE SWALLOWING: 0
SORE THROAT: 0
APNEA: 0
CONSTIPATION: 0
SHORTNESS OF BREATH: 0
ABDOMINAL DISTENTION: 0
FACIAL SWELLING: 0
EYE PAIN: 0
EYE REDNESS: 0
COUGH: 0
ABDOMINAL PAIN: 0
WHEEZING: 0
NAUSEA: 0
CHEST TIGHTNESS: 0
EYE DISCHARGE: 0

## 2024-05-15 NOTE — PATIENT INSTRUCTIONS
Tick bite  Mupirocin and triamcinolone prescribed. Use as directed.    May use otc zyrtec and benadryl as needed    Wash the area with antibacterial soap    Avoid touching and/or scratching the area    Tick borne illnesses can develop weeks to months after a tick bite. Follow up with PCP if signs/symptoms such as fever, body aches, chills, fatigue, malaise, or rashes develop.    Follow up with PCP or rtc if s/s worsening or not improving.    Any condition can change, despite proper treatment. Therefore, if symptoms still persist or worsen after treatment plan intitated today, either go to the nearest ER, or call PCP, or return to  for further evaluation.    Urgent Care evaluation today is not a substitute for PCP visit. Follow up care is your responsibility to discuss and review this UC visit.      Elevated Blood Pressure Reading  Blood pressure was elevated in office today. Discussed this with patient in office.    1st BP reading was 142/78    2nd BP reading was 130/80    Monitor salt intake    Encouraged physical activity.    - Get at least 30 minutes of exercise on most days of the week   - Walking is a good choice   - You also may want to do other activities, such as running, swimming, cycling, or playing tennis or team sports.    Avoid or limit alcohol. Talk to your doctor about whether you can drink any alcohol.    Eat plenty of fruits, vegetables, and low-fat dairy products. Eat less saturated and total fats.    Recommended learning how to check your blood pressure at home.

## 2024-05-15 NOTE — PROGRESS NOTES
[facility]  [facility]  [address] [place] [id number]  Dept: [phone]  Dept Fax: [phone]  Loc: [phone]    Preston Hernandez is a 54 y.o. male who presents today for his medical conditions/complaints as noted below.  Preston Hernandez is complaining of Insect Bite        HPI:   Preston Hernandez presents to the clinic today with complaints of insect bites to the right side of the chest and the left thigh that are red and itchy.  Patient reports insect bites happened a couple days ago.  He says that he knows the bite on his chest was a tick bite, as he pulled the tick off himself.  Denies fever, body aches, chills, drainage from the areas.  No alleviating factors are reported for this.  Symptoms are mild.  Patient is stable.    Insect Bite  Pertinent negatives include no abdominal pain, arthralgias, chest pain, chills, congestion, coughing, diaphoresis, fatigue, fever, headaches, joint swelling, myalgias, nausea, numbness, rash, sore throat or vomiting.       Past Medical History:   Diagnosis Date    Arthritis     Asthma     bronchial    Back pain     Confusion     Depression     Drug abuse (HCC)     Genital warts     Hep C w/o coma, chronic (HCC)     HIV (human immunodeficiency virus infection) (HCC) 1986    Tobacco abuse        Past Surgical History:   Procedure Laterality Date    APPENDECTOMY      COLONOSCOPY  12/09/2008    Dr Rodas    MANDIBLE SURGERY Bilateral     had it wired shut    OTHER SURGICAL HISTORY  7/24/13, 1/24/14    cauterization of per-anal condyloma       Family History   Problem Relation Age of Onset    Diabetes Mother     Heart Disease Father     Cancer Father         liver    Lung Cancer Other         uncles on both sides       Social History     Tobacco Use    Smoking status: Every Day     Current packs/day: 1.50     Average packs/day: 1.5 packs/day for 34.0 years (51.0 ttl pk-yrs)     Types: Cigarettes    Smokeless

## 2024-05-21 DIAGNOSIS — J41.8 MIXED SIMPLE AND MUCOPURULENT CHRONIC BRONCHITIS (HCC): Primary | ICD-10-CM

## 2024-05-21 RX ORDER — ALBUTEROL SULFATE 90 UG/1
2 AEROSOL, METERED RESPIRATORY (INHALATION) 4 TIMES DAILY PRN
Qty: 54 G | Refills: 1 | Status: SHIPPED | OUTPATIENT
Start: 2024-05-21

## 2024-05-21 NOTE — TELEPHONE ENCOUNTER
Preston Hernandez called to request a refill on his medication.      Last office visit : 4/18/2024   Next office visit : 7/18/2024     Requested Prescriptions     Pending Prescriptions Disp Refills    albuterol sulfate HFA (VENTOLIN HFA) 108 (90 Base) MCG/ACT inhaler 54 g 1     Sig: Inhale 2 puffs into the lungs 4 times daily as needed for Wheezing            Michelle Ferrera MA

## 2024-06-03 ENCOUNTER — OFFICE VISIT (OUTPATIENT)
Age: 54
End: 2024-06-03
Payer: MEDICAID

## 2024-06-03 ENCOUNTER — HOSPITAL ENCOUNTER (EMERGENCY)
Facility: HOSPITAL | Age: 54
Discharge: LEFT BEFORE DISCHARGE | End: 2024-06-03
Payer: COMMERCIAL

## 2024-06-03 VITALS
DIASTOLIC BLOOD PRESSURE: 68 MMHG | BODY MASS INDEX: 25.05 KG/M2 | RESPIRATION RATE: 20 BRPM | HEIGHT: 67 IN | SYSTOLIC BLOOD PRESSURE: 110 MMHG | WEIGHT: 159.6 LBS | TEMPERATURE: 97.3 F | HEART RATE: 74 BPM | OXYGEN SATURATION: 97 %

## 2024-06-03 VITALS
RESPIRATION RATE: 18 BRPM | SYSTOLIC BLOOD PRESSURE: 107 MMHG | HEART RATE: 84 BPM | DIASTOLIC BLOOD PRESSURE: 72 MMHG | TEMPERATURE: 97.9 F | WEIGHT: 158.5 LBS | OXYGEN SATURATION: 97 % | HEIGHT: 67 IN | BODY MASS INDEX: 24.88 KG/M2

## 2024-06-03 DIAGNOSIS — H66.004 RECURRENT ACUTE SUPPURATIVE OTITIS MEDIA OF RIGHT EAR WITHOUT SPONTANEOUS RUPTURE OF TYMPANIC MEMBRANE: Primary | ICD-10-CM

## 2024-06-03 LAB
B PARAPERT DNA SPEC QL NAA+PROBE: NOT DETECTED
B PERT DNA SPEC QL NAA+PROBE: NOT DETECTED
C PNEUM DNA NPH QL NAA+NON-PROBE: NOT DETECTED
FLUAV SUBTYP SPEC NAA+PROBE: NOT DETECTED
FLUBV RNA ISLT QL NAA+PROBE: NOT DETECTED
HADV DNA SPEC NAA+PROBE: NOT DETECTED
HCOV 229E RNA SPEC QL NAA+PROBE: NOT DETECTED
HCOV HKU1 RNA SPEC QL NAA+PROBE: NOT DETECTED
HCOV NL63 RNA SPEC QL NAA+PROBE: NOT DETECTED
HCOV OC43 RNA SPEC QL NAA+PROBE: NOT DETECTED
HMPV RNA NPH QL NAA+NON-PROBE: NOT DETECTED
HPIV1 RNA ISLT QL NAA+PROBE: NOT DETECTED
HPIV2 RNA SPEC QL NAA+PROBE: NOT DETECTED
HPIV3 RNA NPH QL NAA+PROBE: NOT DETECTED
HPIV4 P GENE NPH QL NAA+PROBE: NOT DETECTED
M PNEUMO IGG SER IA-ACNC: NOT DETECTED
RHINOVIRUS RNA SPEC NAA+PROBE: DETECTED
RSV RNA NPH QL NAA+NON-PROBE: NOT DETECTED
SARS-COV-2 RNA NPH QL NAA+NON-PROBE: NOT DETECTED

## 2024-06-03 PROCEDURE — 63710000001 DEXAMETHASONE PER 0.25 MG: Performed by: STUDENT IN AN ORGANIZED HEALTH CARE EDUCATION/TRAINING PROGRAM

## 2024-06-03 PROCEDURE — 3017F COLORECTAL CA SCREEN DOC REV: CPT

## 2024-06-03 PROCEDURE — 4004F PT TOBACCO SCREEN RCVD TLK: CPT

## 2024-06-03 PROCEDURE — 0202U NFCT DS 22 TRGT SARS-COV-2: CPT | Performed by: STUDENT IN AN ORGANIZED HEALTH CARE EDUCATION/TRAINING PROGRAM

## 2024-06-03 PROCEDURE — G8427 DOCREV CUR MEDS BY ELIG CLIN: HCPCS

## 2024-06-03 PROCEDURE — 99213 OFFICE O/P EST LOW 20 MIN: CPT

## 2024-06-03 PROCEDURE — G8419 CALC BMI OUT NRM PARAM NOF/U: HCPCS

## 2024-06-03 PROCEDURE — 99211 OFF/OP EST MAY X REQ PHY/QHP: CPT

## 2024-06-03 RX ORDER — DEXAMETHASONE 4 MG/1
10 TABLET ORAL ONCE
Status: COMPLETED | OUTPATIENT
Start: 2024-06-03 | End: 2024-06-03

## 2024-06-03 RX ORDER — CIPROFLOXACIN AND DEXAMETHASONE 3; 1 MG/ML; MG/ML
4 SUSPENSION/ DROPS AURICULAR (OTIC) 2 TIMES DAILY
Qty: 7.5 ML | Refills: 0 | Status: SHIPPED | OUTPATIENT
Start: 2024-06-03 | End: 2024-06-10

## 2024-06-03 RX ORDER — ROSUVASTATIN CALCIUM 5 MG/1
5 TABLET, COATED ORAL DAILY
COMMUNITY

## 2024-06-03 RX ORDER — AZITHROMYCIN 250 MG/1
TABLET, FILM COATED ORAL
Qty: 6 TABLET | Refills: 0 | Status: SHIPPED | OUTPATIENT
Start: 2024-06-03 | End: 2024-06-13

## 2024-06-03 RX ORDER — GUAIFENESIN AND DEXTROMETHORPHAN HYDROBROMIDE 600; 30 MG/1; MG/1
2 TABLET, EXTENDED RELEASE ORAL ONCE
Status: COMPLETED | OUTPATIENT
Start: 2024-06-03 | End: 2024-06-03

## 2024-06-03 RX ADMIN — DEXAMETHASONE 10 MG: 4 TABLET ORAL at 21:25

## 2024-06-03 RX ADMIN — GUAIFENESIN AND DEXTROMETHORPHAN HYDROBROMIDE 2 TABLET: 600; 30 TABLET, EXTENDED RELEASE ORAL at 21:25

## 2024-06-03 ASSESSMENT — ENCOUNTER SYMPTOMS
WHEEZING: 0
DIARRHEA: 0
COUGH: 0
SORE THROAT: 0
SHORTNESS OF BREATH: 0
TROUBLE SWALLOWING: 0
NAUSEA: 0
ABDOMINAL PAIN: 0
VOMITING: 0

## 2024-06-03 NOTE — PROGRESS NOTES
[facility]  [facility]  [address] [place] [address] [address]IVE  [facility] [address]  Dept: [phone]  Dept Fax: [phone]  Loc: [phone]    Preston Hernandez is a 54 y.o. male who presents today for his medical conditions/complaints as noted below.  Preston Hernandez is c/o of Otalgia (BELOW THE EAR TRAVELING DOWN NECK  LT SIDE)        HPI:     Preston Hernandez presents with complaints of left ear pain with swelling to lymph nodes on left side of neck. Reports symptoms started about 4 days ago. Denies any OTC treatment. Denies any fever, sore throat or fatigue. Denies any recent antibiotic or steroid administration.    Patient states he has reoccurring left ear infections with purulent drainage. Has been evaluated by ENT at [facility] in the past. Request referral to [facility] to establish care.      Past Medical History:   Diagnosis Date    Arthritis     Asthma     bronchial    Back pain     Confusion     Depression     Drug abuse (HCC)     Genital warts     Hep C w/o coma, chronic (HCC)     HIV (human immunodeficiency virus infection) ([facility]) 1986    Tobacco abuse      Past Surgical History:   Procedure Laterality Date    APPENDECTOMY      COLONOSCOPY  12/09/2008    Dr Rodas    MANDIBLE SURGERY Bilateral     had it wired shut    OTHER SURGICAL HISTORY  7/24/13, 1/24/14    cauterization of per-anal condyloma       Family History   Problem Relation Age of Onset    Diabetes Mother     Heart Disease Father     Cancer Father         liver    Lung Cancer Other         uncles on both sides       Social History     Tobacco Use    Smoking status: Every Day     Current packs/day: 1.50     Average packs/day: 1.5 packs/day for 34.0 years (51.0 ttl pk-yrs)     Types: Cigarettes    Smokeless tobacco: Never   Substance Use Topics    Alcohol use: No      Current Outpatient Medications   Medication Sig Dispense Refill    rosuvastatin (CRESTOR) 5 MG tablet Take 1 tablet by mouth daily

## 2024-06-03 NOTE — PATIENT INSTRUCTIONS
- Antibiotics sent to the pharmacy, take as directed.  - Ear drops sent tot he pharmacy, take as directed.  - Referral placed to ENT for establishment of care due to reoccurring ear infections.  - Alternate Tylenol/Motrin as needed for fever.  - Rest.  - Encourage oral hydration.  - Apply warm cloth to affected ear for comfort.  - Monitor for any increase in ear pain, new or increase in pus/bloody drainage, or fever with a stick neck/severe headache.  - Follow up with PCP or return to the clinic if symptoms worsen or fail to improve.

## 2024-06-05 ENCOUNTER — TELEPHONE (OUTPATIENT)
Dept: INTERNAL MEDICINE | Age: 54
End: 2024-06-05

## 2024-06-05 ENCOUNTER — APPOINTMENT (OUTPATIENT)
Dept: GENERAL RADIOLOGY | Facility: HOSPITAL | Age: 54
End: 2024-06-05
Payer: COMMERCIAL

## 2024-06-05 PROCEDURE — 71046 X-RAY EXAM CHEST 2 VIEWS: CPT

## 2024-06-05 NOTE — TELEPHONE ENCOUNTER
The patient called the office wanting to speak with Dr. Quiroz. He was very SOB and could hardly get his words out. I advised the patient to go into the ED and be evaluated. The patient voiced understanding.

## 2024-06-07 ENCOUNTER — OFFICE VISIT (OUTPATIENT)
Dept: PRIMARY CARE CLINIC | Age: 54
End: 2024-06-07
Payer: MEDICAID

## 2024-06-07 VITALS
HEIGHT: 67 IN | SYSTOLIC BLOOD PRESSURE: 138 MMHG | DIASTOLIC BLOOD PRESSURE: 80 MMHG | OXYGEN SATURATION: 95 % | WEIGHT: 156 LBS | HEART RATE: 75 BPM | TEMPERATURE: 97.1 F | BODY MASS INDEX: 24.48 KG/M2

## 2024-06-07 DIAGNOSIS — R06.2 WHEEZING: ICD-10-CM

## 2024-06-07 DIAGNOSIS — R05.1 ACUTE COUGH: Primary | ICD-10-CM

## 2024-06-07 PROCEDURE — G8427 DOCREV CUR MEDS BY ELIG CLIN: HCPCS | Performed by: NURSE PRACTITIONER

## 2024-06-07 PROCEDURE — 99214 OFFICE O/P EST MOD 30 MIN: CPT | Performed by: NURSE PRACTITIONER

## 2024-06-07 PROCEDURE — 4004F PT TOBACCO SCREEN RCVD TLK: CPT | Performed by: NURSE PRACTITIONER

## 2024-06-07 PROCEDURE — G8420 CALC BMI NORM PARAMETERS: HCPCS | Performed by: NURSE PRACTITIONER

## 2024-06-07 PROCEDURE — 3017F COLORECTAL CA SCREEN DOC REV: CPT | Performed by: NURSE PRACTITIONER

## 2024-06-07 SDOH — ECONOMIC STABILITY: FOOD INSECURITY: WITHIN THE PAST 12 MONTHS, YOU WORRIED THAT YOUR FOOD WOULD RUN OUT BEFORE YOU GOT MONEY TO BUY MORE.: NEVER TRUE

## 2024-06-07 SDOH — ECONOMIC STABILITY: FOOD INSECURITY: WITHIN THE PAST 12 MONTHS, THE FOOD YOU BOUGHT JUST DIDN'T LAST AND YOU DIDN'T HAVE MONEY TO GET MORE.: NEVER TRUE

## 2024-06-07 SDOH — ECONOMIC STABILITY: INCOME INSECURITY: HOW HARD IS IT FOR YOU TO PAY FOR THE VERY BASICS LIKE FOOD, HOUSING, MEDICAL CARE, AND HEATING?: NOT HARD AT ALL

## 2024-06-07 ASSESSMENT — ENCOUNTER SYMPTOMS
DIARRHEA: 0
ABDOMINAL PAIN: 0
VOMITING: 0
NAUSEA: 0
SORE THROAT: 0
SHORTNESS OF BREATH: 0
CHEST TIGHTNESS: 1
WHEEZING: 1
COLOR CHANGE: 0
COUGH: 1

## 2024-06-07 NOTE — PROGRESS NOTES
Mr.William Hernandez is a 54 y.o. male who presents today for  Chief Complaint   Patient presents with    Wheezing       HPI:  06/03/2024 Seen at [facility] and diagnosed with Right Otitis Media. Treated with oral azithromycin and Ciprodex drops    06/05/2024 Seen at [facility]. CXR:    1.  Lungs are clear and well expanded. No visualized infiltrate. 2.  Age indeterminant lateral right eighth rib fracture    Diagnosed with COPD exacerbation and treated with Albuterol nebulizer treatments and oral prednisone.     Presents today with concerns of persistent productive cough and chest tightness. He states he is taking the prescribed oral steroids, but states the albuterol makes his heart race, so he does not take it as often as prescribed.     Review of Systems   Constitutional:  Negative for activity change and fever.   HENT:  Negative for congestion, ear pain and sore throat.    Respiratory:  Positive for cough, chest tightness and wheezing. Negative for shortness of breath.    Cardiovascular:  Negative for chest pain.   Gastrointestinal:  Negative for abdominal pain, diarrhea, nausea and vomiting.   Genitourinary:  Negative for frequency and urgency.   Musculoskeletal:  Negative for arthralgias and myalgias.   Skin:  Negative for color change.   Neurological:  Negative for dizziness, weakness and numbness.   Psychiatric/Behavioral:  Negative for agitation. The patient is not nervous/anxious.        Past Medical History:   Diagnosis Date    Arthritis     Asthma     bronchial    Back pain     Confusion     Depression     Drug abuse ([facility])     Genital warts     Hep C w/o coma, chronic ([facility])     HIV (human immunodeficiency virus infection) ([facility]) 1986    Tobacco abuse        Current Outpatient Medications   Medication Sig Dispense Refill    rosuvastatin (CRESTOR) 5 MG tablet Take 1 tablet by mouth daily      azithromycin (ZITHROMAX) 250 MG tablet 500mg on day 1 followed by 250mg on days 2 - 5 6 tablet 0

## 2024-06-27 ENCOUNTER — OFFICE VISIT (OUTPATIENT)
Dept: PRIMARY CARE CLINIC | Age: 54
End: 2024-06-27
Payer: MEDICAID

## 2024-06-27 VITALS
WEIGHT: 156 LBS | DIASTOLIC BLOOD PRESSURE: 78 MMHG | BODY MASS INDEX: 24.48 KG/M2 | OXYGEN SATURATION: 98 % | HEIGHT: 67 IN | HEART RATE: 95 BPM | SYSTOLIC BLOOD PRESSURE: 118 MMHG

## 2024-06-27 DIAGNOSIS — J41.1 MUCOPURULENT CHRONIC BRONCHITIS (HCC): ICD-10-CM

## 2024-06-27 DIAGNOSIS — H92.02 LEFT EAR PAIN: Primary | ICD-10-CM

## 2024-06-27 DIAGNOSIS — M54.50 CHRONIC MIDLINE LOW BACK PAIN WITHOUT SCIATICA: ICD-10-CM

## 2024-06-27 DIAGNOSIS — G89.29 CHRONIC MIDLINE LOW BACK PAIN WITHOUT SCIATICA: ICD-10-CM

## 2024-06-27 PROCEDURE — 4004F PT TOBACCO SCREEN RCVD TLK: CPT | Performed by: FAMILY MEDICINE

## 2024-06-27 PROCEDURE — 3023F SPIROM DOC REV: CPT | Performed by: FAMILY MEDICINE

## 2024-06-27 PROCEDURE — 99213 OFFICE O/P EST LOW 20 MIN: CPT | Performed by: FAMILY MEDICINE

## 2024-06-27 PROCEDURE — G8420 CALC BMI NORM PARAMETERS: HCPCS | Performed by: FAMILY MEDICINE

## 2024-06-27 PROCEDURE — G8427 DOCREV CUR MEDS BY ELIG CLIN: HCPCS | Performed by: FAMILY MEDICINE

## 2024-06-27 PROCEDURE — 3017F COLORECTAL CA SCREEN DOC REV: CPT | Performed by: FAMILY MEDICINE

## 2024-06-27 NOTE — PROGRESS NOTES
Reason For Visit:   Follow up for COPD.     Combined HPI and A/P:      Diagnosis Orders   1. Left ear pain        2. Mucopurulent chronic bronchitis (HCC)        3. Chronic midline low back pain without sciatica  ibuprofen (ADVIL;MOTRIN) 800 MG tablet          1.) Left Ear Pain: He has an appointment with ENT tomorrow for evaluation.     2.) COPD: He was treated for a COPD exacerbation on 6/7/24. He was treated with Albuterol nebulizer treatments and oral prednisone. He feels that his breathing improved. He does have increased dyspnea with the increased heat and humidity.          Return if symptoms worsen or fail to improve.    We discussed use, benefit, and side effects of prescribed medications.  All patient questions answered.   Patient agreed with treatment plan.   I reviewed available records in our system and care everywhere. In cases where records are not available, the records have been requested and will be reviewed when available.     Subjective      Past Surgical History:   Procedure Laterality Date    APPENDECTOMY      COLONOSCOPY  12/09/2008    Dr Rodas    MANDIBLE SURGERY Bilateral     had it wired shut    OTHER SURGICAL HISTORY  7/24/13, 1/24/14    cauterization of per-anal condyloma     Family History   Problem Relation Age of Onset    Diabetes Mother     Heart Disease Father     Cancer Father         liver    Lung Cancer Other         uncles on both sides     Social History     Tobacco Use    Smoking status: Every Day     Current packs/day: 1.50     Average packs/day: 1.5 packs/day for 34.0 years (51.0 ttl pk-yrs)     Types: Cigarettes    Smokeless tobacco: Never   Substance Use Topics    Alcohol use: No      Current Outpatient Medications   Medication Sig Dispense Refill    ibuprofen (ADVIL;MOTRIN) 800 MG tablet Take 1 tablet by mouth 2 times daily as needed for Pain 60 tablet 5    rosuvastatin (CRESTOR) 5 MG tablet Take 1 tablet by mouth daily      albuterol sulfate HFA (VENTOLIN HFA) 108

## 2024-06-28 ENCOUNTER — OFFICE VISIT (OUTPATIENT)
Dept: ENT CLINIC | Age: 54
End: 2024-06-28

## 2024-06-28 VITALS — SYSTOLIC BLOOD PRESSURE: 116 MMHG | DIASTOLIC BLOOD PRESSURE: 70 MMHG

## 2024-06-28 DIAGNOSIS — R13.10 DYSPHAGIA, UNSPECIFIED TYPE: ICD-10-CM

## 2024-06-28 DIAGNOSIS — H92.02 OTALGIA, LEFT: ICD-10-CM

## 2024-06-28 DIAGNOSIS — R09.82 POSTNASAL DRIP: ICD-10-CM

## 2024-06-28 DIAGNOSIS — H66.92 ACUTE OTITIS MEDIA, LEFT: Primary | ICD-10-CM

## 2024-06-28 RX ORDER — PREDNISONE 20 MG/1
TABLET ORAL
Qty: 21 TABLET | Refills: 0 | Status: SHIPPED | OUTPATIENT
Start: 2024-06-28

## 2024-06-28 RX ORDER — IPRATROPIUM BROMIDE 21 UG/1
2 SPRAY, METERED NASAL EVERY 12 HOURS
Qty: 30 ML | Refills: 3 | Status: SHIPPED | OUTPATIENT
Start: 2024-06-28

## 2024-06-28 RX ORDER — CIPROFLOXACIN AND DEXAMETHASONE 3; 1 MG/ML; MG/ML
4 SUSPENSION/ DROPS AURICULAR (OTIC) 2 TIMES DAILY
Qty: 7.5 ML | Refills: 0 | Status: SHIPPED | OUTPATIENT
Start: 2024-06-28 | End: 2024-07-05

## 2024-06-28 RX ORDER — CLINDAMYCIN HYDROCHLORIDE 300 MG/1
300 CAPSULE ORAL 3 TIMES DAILY
Qty: 30 CAPSULE | Refills: 0 | Status: SHIPPED | OUTPATIENT
Start: 2024-06-28 | End: 2024-07-08

## 2024-06-28 ASSESSMENT — ENCOUNTER SYMPTOMS
TROUBLE SWALLOWING: 1
ALLERGIC/IMMUNOLOGIC NEGATIVE: 1
EYES NEGATIVE: 1
GASTROINTESTINAL NEGATIVE: 1
RESPIRATORY NEGATIVE: 1

## 2024-06-28 NOTE — PROGRESS NOTES
2024    Preston Hernandez (:  1970) is a 54 y.o. male, Established patient, here for evaluation of the following chief complaint(s):  New Patient (Left ear, throat itching, worried about throat cancer)      Vitals:    24 1006   BP: 116/70       Wt Readings from Last 3 Encounters:   24 70.8 kg (156 lb)   24 70.8 kg (156 lb)   24 72.4 kg (159 lb 9.6 oz)       BP Readings from Last 3 Encounters:   24 116/70   24 118/78   24 138/80         SUBJECTIVE/OBJECTIVE:    Patient seen today for his left ear. He states that he has had trouble with his left ear since childhood. He states that he has not been able to hear well out of his left ear for quite some time. He also complains of left ear pain, itching, and aural fullness. He states he has tried ear drops without improvement. He states that he has had some black drainage from his left ear. He also notes drainage down his throat and having to clear his throat a lot. He also feels like his throat itches. He also notes nasal congestion. He states that he will occasionally get choked on meats and this has been an issue for several years. He does report a history of reflux and is taking Nexium.        Review of Systems   Constitutional: Negative.    HENT:  Positive for congestion, ear discharge, ear pain (left pain and fullness), hearing loss (left), postnasal drip and trouble swallowing (occasional).         Left ear itching   Eyes: Negative.    Respiratory: Negative.     Cardiovascular: Negative.    Gastrointestinal: Negative.    Endocrine: Negative.    Musculoskeletal: Negative.    Skin: Negative.    Allergic/Immunologic: Negative.    Neurological: Negative.    Hematological: Negative.    Psychiatric/Behavioral: Negative.          Physical Exam  Vitals reviewed.   Constitutional:       Appearance: Normal appearance. He is normal weight.   HENT:      Head: Normocephalic and atraumatic.      Right Ear: Tympanic

## 2024-07-04 RX ORDER — IBUPROFEN 800 MG/1
800 TABLET ORAL 2 TIMES DAILY PRN
Qty: 60 TABLET | Refills: 5 | Status: SHIPPED | OUTPATIENT
Start: 2024-07-04

## 2024-07-04 ASSESSMENT — ENCOUNTER SYMPTOMS
VOMITING: 0
COUGH: 0
CONSTIPATION: 0
NAUSEA: 0
ABDOMINAL PAIN: 0
DIARRHEA: 0
BACK PAIN: 1
TROUBLE SWALLOWING: 0
SHORTNESS OF BREATH: 1

## 2024-07-16 ENCOUNTER — TRANSCRIBE ORDERS (OUTPATIENT)
Dept: ADMINISTRATIVE | Facility: HOSPITAL | Age: 54
End: 2024-07-16
Payer: COMMERCIAL

## 2024-07-16 DIAGNOSIS — K74.00 HEPATIC FIBROSIS: Primary | ICD-10-CM

## 2024-07-22 ENCOUNTER — HOSPITAL ENCOUNTER (OUTPATIENT)
Dept: GENERAL RADIOLOGY | Age: 54
Discharge: HOME | End: 2024-07-22
Payer: MEDICAID

## 2024-07-22 DIAGNOSIS — R13.10 DYSPHAGIA, UNSPECIFIED TYPE: ICD-10-CM

## 2024-07-22 PROCEDURE — 74220 X-RAY XM ESOPHAGUS 1CNTRST: CPT

## 2024-07-24 ENCOUNTER — TELEPHONE (OUTPATIENT)
Dept: PRIMARY CARE CLINIC | Age: 54
End: 2024-07-24

## 2024-07-24 NOTE — TELEPHONE ENCOUNTER
TT patient about missed appt on 07.24. He stated he got busy and forgot about appt. 3rd no show. Policy explained. Letter sent

## 2024-07-25 ENCOUNTER — HOSPITAL ENCOUNTER (EMERGENCY)
Facility: HOSPITAL | Age: 54
Discharge: HOME | End: 2024-07-26
Payer: COMMERCIAL

## 2024-07-25 ENCOUNTER — APPOINTMENT (OUTPATIENT)
Dept: CT IMAGING | Facility: HOSPITAL | Age: 54
End: 2024-07-25
Payer: COMMERCIAL

## 2024-07-25 ENCOUNTER — APPOINTMENT (OUTPATIENT)
Dept: GENERAL RADIOLOGY | Facility: HOSPITAL | Age: 54
End: 2024-07-25
Payer: COMMERCIAL

## 2024-07-25 DIAGNOSIS — R10.12 LEFT UPPER QUADRANT ABDOMINAL PAIN: Primary | ICD-10-CM

## 2024-07-25 LAB
ALBUMIN SERPL-MCNC: 4.7 G/DL (ref 3.5–5.2)
ALBUMIN/GLOB SERPL: 1.2 G/DL
ALP SERPL-CCNC: 70 U/L (ref 39–117)
ALT SERPL W P-5'-P-CCNC: 26 U/L (ref 1–41)
ANION GAP SERPL CALCULATED.3IONS-SCNC: 10 MMOL/L (ref 5–15)
AST SERPL-CCNC: 33 U/L (ref 1–40)
BASOPHILS # BLD AUTO: 0.07 10*3/MM3 (ref 0–0.2)
BASOPHILS NFR BLD AUTO: 0.9 % (ref 0–1.5)
BILIRUB SERPL-MCNC: 0.2 MG/DL (ref 0–1.2)
BILIRUB UR QL STRIP: NEGATIVE
BUN SERPL-MCNC: 22 MG/DL (ref 6–20)
BUN/CREAT SERPL: 20.2 (ref 7–25)
CALCIUM SPEC-SCNC: 10 MG/DL (ref 8.6–10.5)
CHLORIDE SERPL-SCNC: 103 MMOL/L (ref 98–107)
CLARITY UR: CLEAR
CO2 SERPL-SCNC: 27 MMOL/L (ref 22–29)
COLOR UR: ABNORMAL
CREAT SERPL-MCNC: 1.09 MG/DL (ref 0.76–1.27)
D-LACTATE SERPL-SCNC: 0.9 MMOL/L (ref 0.5–2)
DEPRECATED RDW RBC AUTO: 43.4 FL (ref 37–54)
EGFRCR SERPLBLD CKD-EPI 2021: 80.2 ML/MIN/1.73
EOSINOPHIL # BLD AUTO: 0.31 10*3/MM3 (ref 0–0.4)
EOSINOPHIL NFR BLD AUTO: 4.1 % (ref 0.3–6.2)
ERYTHROCYTE [DISTWIDTH] IN BLOOD BY AUTOMATED COUNT: 12.4 % (ref 12.3–15.4)
GLOBULIN UR ELPH-MCNC: 3.8 GM/DL
GLUCOSE SERPL-MCNC: 72 MG/DL (ref 65–99)
GLUCOSE UR STRIP-MCNC: NEGATIVE MG/DL
HCT VFR BLD AUTO: 47.1 % (ref 37.5–51)
HGB BLD-MCNC: 15.8 G/DL (ref 13–17.7)
HGB UR QL STRIP.AUTO: NEGATIVE
HOLD SPECIMEN: NORMAL
HOLD SPECIMEN: NORMAL
IMM GRANULOCYTES # BLD AUTO: 0.01 10*3/MM3 (ref 0–0.05)
IMM GRANULOCYTES NFR BLD AUTO: 0.1 % (ref 0–0.5)
KETONES UR QL STRIP: ABNORMAL
LEUKOCYTE ESTERASE UR QL STRIP.AUTO: NEGATIVE
LIPASE SERPL-CCNC: 90 U/L (ref 13–60)
LYMPHOCYTES # BLD AUTO: 3.22 10*3/MM3 (ref 0.7–3.1)
LYMPHOCYTES NFR BLD AUTO: 42.5 % (ref 19.6–45.3)
MCH RBC QN AUTO: 31.7 PG (ref 26.6–33)
MCHC RBC AUTO-ENTMCNC: 33.5 G/DL (ref 31.5–35.7)
MCV RBC AUTO: 94.4 FL (ref 79–97)
MONOCYTES # BLD AUTO: 0.7 10*3/MM3 (ref 0.1–0.9)
MONOCYTES NFR BLD AUTO: 9.2 % (ref 5–12)
NEUTROPHILS NFR BLD AUTO: 3.26 10*3/MM3 (ref 1.7–7)
NEUTROPHILS NFR BLD AUTO: 43.2 % (ref 42.7–76)
NITRITE UR QL STRIP: NEGATIVE
NRBC BLD AUTO-RTO: 0 /100 WBC (ref 0–0.2)
PH UR STRIP.AUTO: 6 [PH] (ref 5–8)
PLATELET # BLD AUTO: 244 10*3/MM3 (ref 140–450)
PMV BLD AUTO: 9.2 FL (ref 6–12)
POTASSIUM SERPL-SCNC: 4.2 MMOL/L (ref 3.5–5.2)
PROT SERPL-MCNC: 8.5 G/DL (ref 6–8.5)
PROT UR QL STRIP: NEGATIVE
RBC # BLD AUTO: 4.99 10*6/MM3 (ref 4.14–5.8)
SODIUM SERPL-SCNC: 140 MMOL/L (ref 136–145)
SP GR UR STRIP: >1.03 (ref 1–1.03)
UROBILINOGEN UR QL STRIP: ABNORMAL
WBC NRBC COR # BLD AUTO: 7.57 10*3/MM3 (ref 3.4–10.8)
WHOLE BLOOD HOLD COAG: NORMAL
WHOLE BLOOD HOLD SPECIMEN: NORMAL

## 2024-07-25 PROCEDURE — 74177 CT ABD & PELVIS W/CONTRAST: CPT

## 2024-07-25 PROCEDURE — 83605 ASSAY OF LACTIC ACID: CPT

## 2024-07-25 PROCEDURE — 36415 COLL VENOUS BLD VENIPUNCTURE: CPT

## 2024-07-25 PROCEDURE — 25010000002 ONDANSETRON PER 1 MG

## 2024-07-25 PROCEDURE — 25010000002 KETOROLAC TROMETHAMINE PER 15 MG

## 2024-07-25 PROCEDURE — 74018 RADEX ABDOMEN 1 VIEW: CPT

## 2024-07-25 PROCEDURE — 25510000001 IOPAMIDOL 61 % SOLUTION

## 2024-07-25 PROCEDURE — 99285 EMERGENCY DEPT VISIT HI MDM: CPT

## 2024-07-25 PROCEDURE — 96374 THER/PROPH/DIAG INJ IV PUSH: CPT

## 2024-07-25 PROCEDURE — 83690 ASSAY OF LIPASE: CPT

## 2024-07-25 PROCEDURE — 80053 COMPREHEN METABOLIC PANEL: CPT

## 2024-07-25 PROCEDURE — 96375 TX/PRO/DX INJ NEW DRUG ADDON: CPT

## 2024-07-25 PROCEDURE — 81003 URINALYSIS AUTO W/O SCOPE: CPT

## 2024-07-25 PROCEDURE — 25810000003 SODIUM CHLORIDE 0.9 % SOLUTION

## 2024-07-25 PROCEDURE — 85025 COMPLETE CBC W/AUTO DIFF WBC: CPT

## 2024-07-25 RX ORDER — ONDANSETRON 2 MG/ML
4 INJECTION INTRAMUSCULAR; INTRAVENOUS ONCE
Status: COMPLETED | OUTPATIENT
Start: 2024-07-25 | End: 2024-07-25

## 2024-07-25 RX ORDER — KETOROLAC TROMETHAMINE 15 MG/ML
15 INJECTION, SOLUTION INTRAMUSCULAR; INTRAVENOUS ONCE
Status: COMPLETED | OUTPATIENT
Start: 2024-07-25 | End: 2024-07-25

## 2024-07-25 RX ORDER — SODIUM CHLORIDE 0.9 % (FLUSH) 0.9 %
10 SYRINGE (ML) INJECTION AS NEEDED
Status: DISCONTINUED | OUTPATIENT
Start: 2024-07-25 | End: 2024-07-26 | Stop reason: HOSPADM

## 2024-07-25 RX ADMIN — KETOROLAC TROMETHAMINE 15 MG: 15 INJECTION, SOLUTION INTRAMUSCULAR; INTRAVENOUS at 21:58

## 2024-07-25 RX ADMIN — IOPAMIDOL 100 ML: 612 INJECTION, SOLUTION INTRAVENOUS at 22:38

## 2024-07-25 RX ADMIN — ONDANSETRON 4 MG: 2 INJECTION INTRAMUSCULAR; INTRAVENOUS at 21:58

## 2024-07-25 RX ADMIN — SODIUM CHLORIDE 1000 ML: 9 INJECTION, SOLUTION INTRAVENOUS at 21:58

## 2024-07-26 ENCOUNTER — OFFICE VISIT (OUTPATIENT)
Dept: ENT CLINIC | Age: 54
End: 2024-07-26

## 2024-07-26 VITALS
WEIGHT: 154 LBS | SYSTOLIC BLOOD PRESSURE: 112 MMHG | DIASTOLIC BLOOD PRESSURE: 60 MMHG | BODY MASS INDEX: 24.17 KG/M2 | HEIGHT: 67 IN

## 2024-07-26 VITALS
BODY MASS INDEX: 24.09 KG/M2 | SYSTOLIC BLOOD PRESSURE: 118 MMHG | DIASTOLIC BLOOD PRESSURE: 74 MMHG | RESPIRATION RATE: 16 BRPM | HEART RATE: 73 BPM | OXYGEN SATURATION: 98 % | TEMPERATURE: 98.5 F | HEIGHT: 67 IN | WEIGHT: 153.5 LBS

## 2024-07-26 DIAGNOSIS — H66.92 ACUTE OTITIS MEDIA, LEFT: Primary | ICD-10-CM

## 2024-07-26 DIAGNOSIS — K44.9 SLIDING HIATAL HERNIA: ICD-10-CM

## 2024-07-26 PROCEDURE — 25010000002 DROPERIDOL PER 5 MG

## 2024-07-26 PROCEDURE — 96375 TX/PRO/DX INJ NEW DRUG ADDON: CPT

## 2024-07-26 RX ORDER — PREDNISONE 20 MG/1
TABLET ORAL
Qty: 21 TABLET | Refills: 0 | Status: SHIPPED | OUTPATIENT
Start: 2024-07-26

## 2024-07-26 RX ORDER — DROPERIDOL 2.5 MG/ML
2.5 INJECTION, SOLUTION INTRAMUSCULAR; INTRAVENOUS ONCE
Status: COMPLETED | OUTPATIENT
Start: 2024-07-26 | End: 2024-07-26

## 2024-07-26 RX ORDER — CLINDAMYCIN HYDROCHLORIDE 300 MG/1
300 CAPSULE ORAL 3 TIMES DAILY
Qty: 30 CAPSULE | Refills: 0 | Status: SHIPPED | OUTPATIENT
Start: 2024-07-26 | End: 2024-08-05

## 2024-07-26 RX ADMIN — DROPERIDOL 2.5 MG: 2.5 INJECTION, SOLUTION INTRAMUSCULAR; INTRAVENOUS at 00:10

## 2024-07-26 ASSESSMENT — ENCOUNTER SYMPTOMS
EYES NEGATIVE: 1
GASTROINTESTINAL NEGATIVE: 1
RESPIRATORY NEGATIVE: 1
ALLERGIC/IMMUNOLOGIC NEGATIVE: 1

## 2024-07-26 NOTE — ED PROVIDER NOTES
Subjective   History of Present Illness  Patient is a 54-year-old male who presents emergency department complaints of left-sided abdominal pain.  Patient is tender on exam on his left side.  Patient denies any trauma or injury.  No fevers.  No nausea, vomiting, diarrhea.  Patient reports that he is currently in AA.        Review of Systems   Gastrointestinal:  Positive for abdominal pain.   All other systems reviewed and are negative.      Past Medical History:   Diagnosis Date    AIDS     Asthma     COPD (chronic obstructive pulmonary disease)     Disease of thyroid gland     GERD (gastroesophageal reflux disease)     Hep C w/ coma, chronic     Hyperlipidemia        Allergies   Allergen Reactions    Amoxicillin Other (See Comments)     unknown    Other      SOME HIV MEDICATION, DONT KNOW NAME     Remeron [Mirtazapine] Other (See Comments)     unknown    Ziagen [Abacavir] Other (See Comments)     unknown    Bactrim [Sulfamethoxazole-Trimethoprim] Rash       Past Surgical History:   Procedure Laterality Date    APPENDECTOMY      COLONOSCOPY      2013? ([facility]) polyps    COLONOSCOPY N/A 06/09/2022    Procedure: COLONOSCOPY WITH ANESTHESIA;  Surgeon: Michael Israel MD;  Location: [facility] ENDOSCOPY;  Service: Gastroenterology;  Laterality: N/A;  pre hx colon polyp  post; normal  Alice Anderson MD    ENDOSCOPY      ENDOSCOPY N/A 06/09/2022    Procedure: ESOPHAGOGASTRODUODENOSCOPY WITH ANESTHESIA;  Surgeon: Michael Israel MD;  Location: [facility] ENDOSCOPY;  Service: Gastroenterology;  Laterality: N/A;  pre dysphagia  post candidiasis; stricture dilated  Alice Anderson MD    ENDOSCOPY N/A 03/14/2023    Procedure: ESOPHAGOGASTRODUODENOSCOPY WITH ANESTHESIA;  Surgeon: Michael Israel MD;  Location: [facility] ENDOSCOPY;  Service: Gastroenterology;  Laterality: N/A;  Pre: Epigastric pain, Dysphagia, Abnormal CT of the abdomen  Post: esophagitis   Alice Anderson MD    MANDIBLE FRACTURE SURGERY          Family History   Problem Relation Age of Onset    Heart attack Mother     Diabetes Mother     Heart attack Father     Liver cancer Father     Colon cancer Neg Hx     Colon polyps Neg Hx        Social History     Socioeconomic History    Marital status:    Tobacco Use    Smoking status: Every Day     Current packs/day: 0.50     Average packs/day: 0.5 packs/day for 40.0 years (20.0 ttl pk-yrs)     Types: Cigarettes    Smokeless tobacco: Never   Vaping Use    Vaping status: Former    Substances: Nicotine    Devices: Disposable    Passive vaping exposure: Yes   Substance and Sexual Activity    Alcohol use: No    Drug use: Not Currently     Types: Methamphetamines, Marijuana, Heroin, LSD, Cocaine(coke)     Comment: Clean for 22 months    Sexual activity: Defer           Objective   Physical Exam  Vitals and nursing note reviewed.   Constitutional:       General: He is not in acute distress.     Appearance: He is well-developed and normal weight. He is not ill-appearing or toxic-appearing.   HENT:      Head: Normocephalic.   Cardiovascular:      Rate and Rhythm: Normal rate and regular rhythm.      Pulses: Normal pulses.      Heart sounds: Normal heart sounds.   Pulmonary:      Effort: Pulmonary effort is normal.      Breath sounds: Normal breath sounds.   Abdominal:      General: Abdomen is flat. Bowel sounds are normal. There is no distension.      Palpations: Abdomen is soft.      Tenderness: There is abdominal tenderness (Left abd pain).   Musculoskeletal:         General: Normal range of motion.      Cervical back: Normal range of motion and neck supple.   Skin:     General: Skin is warm and dry.   Neurological:      General: No focal deficit present.      Mental Status: He is alert and oriented to person, place, and time. Mental status is at baseline.   Psychiatric:         Mood and Affect: Mood normal.         Behavior: Behavior normal.         Thought Content: Thought content normal.          Judgment: Judgment normal.         Procedures           ED Course  ED Course as of 07/26/24 0051   Fri Jul 26, 2024   0021 Stat rad results of CT abdomen pelvis revealed no acute findings. [KR]      ED Course User Index  [KR] Lesa Leblanc APRN                                             Medical Decision Making  Alex Ghotra is a very pleasant 54 y.o. male who presents to the emergency department for abdominal pain.     Patient was non-toxic and not-ill appearing on arrival. Vital signs stable.     Patient's presentation raises suspicion for differentials including, but not limited to, pancreatitis, kidney stone, UTI.     External (non-ED) record review: None    Given this, Alex was placed on the monitor. Laboratory studies and imaging studies were ordered including CBC, CMP, lipase, lactic acid, urinalysis, CT abdomen pelvis with contrast.     Alex was given IV fluids, IV Zofran, IV Toradol, IV droperidol for symptomatic relief.    Imaging was reviewed by stat rad radiologist.  CT scan negative for acute findings.    Labs were reviewed.  CMP unremarkable.  CBC with no leukocytosis, stable H&H.  Lipase 90.  Lactic acid normal.  Urinalysis not indicative of infection.  On re-evaluation, patient remained hemodynamically stable and appeared to be comfortable and in no acute distress.  Patient reported that he was ready to go home.    I discussed all of the lab and imaging results with the patient during this visit in the emergency department. I answered all the questions regarding the emergency department evaluation, diagnosis, and treatment plan. We talked about how crucial it is for the patient to follow up by calling their primary care provider as soon as possible to schedule an appointment for within the next few days or as soon as possible so that the symptoms can be reassessed to see if they have improved or to answer any additional questions. I also provided the patient with advice on returning  safely and urged the patient to visit the emergency department right away if any worsening or new symptoms appeared. The patient verbalized understanding of the discharge instructions and agreed with them. Alex was discharged in stable condition.    Signed by:   ARACELI Frazier 7/26/2024 00:46 CDT     Dragon disclaimer:  Part of this note may be an electronic transcription/translation of spoken language to printed text using the Dragon Dictation System.    Problems Addressed:  Left upper quadrant abdominal pain: acute illness or injury    Amount and/or Complexity of Data Reviewed  Radiology: ordered.    Risk  Prescription drug management.        Final diagnoses:   Left upper quadrant abdominal pain       ED Disposition  ED Disposition       ED Disposition   Discharge    Condition   Stable    Comment   --               Basil Santiago MD  [address] [place] [address]   [address] [address]  [phone]    Schedule an appointment as soon as possible for a visit in 1 day      [facility] EMERGENCY DEPARTMENT  [address] [place] [id number] [phone]  Go to   If symptoms worsen         Medication List        Changed      Diclofenac Sodium 1 % gel gel  Commonly known as: VOLTAREN  Apply 4 g topically to the appropriate area as directed 3 (Three) Times a Day.  What changed:   when to take this  reasons to take this                 Lesa Leblanc APRN  07/26/24 0051

## 2024-07-26 NOTE — DISCHARGE INSTRUCTIONS
Today you are seen in the ER for your symptoms.  Your CT scan revealed no acute findings.  Please follow with primary care provider soon as possible to reassess symptoms.  Please return the ER for any new or worsening symptoms.

## 2024-07-26 NOTE — PROGRESS NOTES
Pupils: Pupils are equal, round, and reactive to light.   Cardiovascular:      Rate and Rhythm: Normal rate and regular rhythm.   Pulmonary:      Effort: Pulmonary effort is normal.   Musculoskeletal:      Cervical back: Normal range of motion.   Skin:     General: Skin is warm and dry.   Neurological:      General: No focal deficit present.      Mental Status: He is alert and oriented to person, place, and time.   Psychiatric:         Mood and Affect: Mood normal.         Behavior: Behavior normal.        Procedure Note:    Otomicroscopy     An operating microscope was utilized to visualize the left external auditory canal using a nasal speculum. The external auditory canal is clear. The tympanic membrane is erythematous. The TM is intact. Ossicles appear intact. No fluid visualized in the middle ear.       ASSESSMENT/PLAN:    1. Acute otitis media, left  -     clindamycin (CLEOCIN) 300 MG capsule; Take 1 capsule by mouth 3 times daily for 10 days, Disp-30 capsule, R-0Normal  -     predniSONE (DELTASONE) 20 MG tablet; 3 tabs per day for 4 days, then 2 tabs for 3 days, then 1 tabs for 3 days, Disp-21 tablet, R-0Normal  2. Sliding hiatal hernia  -     Neris Lancaster APRN, Gastroenterology, [place]    Start clindamycin and prednisone for left ear. We will refer him to GI for further evaluation of hiatal hernia. Follow-up in about 4 weeks, sooner if needed.    Return in about 4 weeks (around 8/23/2024).    An electronic signature was used to authenticate this note.    Henrietta Jackson, JUAN LUIS - CNP       Please note that this chart was generated using dragon dictation software.  Although every effort was made to ensure the accuracy of this automated transcription, some errors in transcription may have occurred.

## 2024-07-29 ENCOUNTER — TELEPHONE (OUTPATIENT)
Dept: ENT CLINIC | Age: 54
End: 2024-07-29

## 2024-07-29 ENCOUNTER — HOSPITAL ENCOUNTER (OUTPATIENT)
Dept: GENERAL RADIOLOGY | Age: 54
Discharge: HOME | End: 2024-07-29
Payer: MEDICAID

## 2024-07-29 ENCOUNTER — OFFICE VISIT (OUTPATIENT)
Dept: PRIMARY CARE CLINIC | Age: 54
End: 2024-07-29
Payer: MEDICAID

## 2024-07-29 VITALS
HEART RATE: 90 BPM | SYSTOLIC BLOOD PRESSURE: 122 MMHG | WEIGHT: 153 LBS | BODY MASS INDEX: 24.01 KG/M2 | HEIGHT: 67 IN | OXYGEN SATURATION: 96 % | DIASTOLIC BLOOD PRESSURE: 78 MMHG

## 2024-07-29 DIAGNOSIS — M54.50 CHRONIC MIDLINE LOW BACK PAIN WITHOUT SCIATICA: ICD-10-CM

## 2024-07-29 DIAGNOSIS — M54.50 CHRONIC MIDLINE LOW BACK PAIN WITHOUT SCIATICA: Primary | ICD-10-CM

## 2024-07-29 DIAGNOSIS — G89.29 CHRONIC MIDLINE LOW BACK PAIN WITHOUT SCIATICA: ICD-10-CM

## 2024-07-29 DIAGNOSIS — G89.29 CHRONIC BILATERAL THORACIC BACK PAIN: ICD-10-CM

## 2024-07-29 DIAGNOSIS — K44.9 SLIDING HIATAL HERNIA: Primary | ICD-10-CM

## 2024-07-29 DIAGNOSIS — G89.29 CHRONIC MIDLINE LOW BACK PAIN WITHOUT SCIATICA: Primary | ICD-10-CM

## 2024-07-29 DIAGNOSIS — R07.81 RIB PAIN: ICD-10-CM

## 2024-07-29 DIAGNOSIS — M54.6 CHRONIC BILATERAL THORACIC BACK PAIN: ICD-10-CM

## 2024-07-29 PROCEDURE — 4004F PT TOBACCO SCREEN RCVD TLK: CPT | Performed by: FAMILY MEDICINE

## 2024-07-29 PROCEDURE — 72100 X-RAY EXAM L-S SPINE 2/3 VWS: CPT

## 2024-07-29 PROCEDURE — 3017F COLORECTAL CA SCREEN DOC REV: CPT | Performed by: FAMILY MEDICINE

## 2024-07-29 PROCEDURE — G8428 CUR MEDS NOT DOCUMENT: HCPCS | Performed by: FAMILY MEDICINE

## 2024-07-29 PROCEDURE — 72070 X-RAY EXAM THORAC SPINE 2VWS: CPT

## 2024-07-29 PROCEDURE — G8420 CALC BMI NORM PARAMETERS: HCPCS | Performed by: FAMILY MEDICINE

## 2024-07-29 PROCEDURE — 99214 OFFICE O/P EST MOD 30 MIN: CPT | Performed by: FAMILY MEDICINE

## 2024-07-29 PROCEDURE — 71100 X-RAY EXAM RIBS UNI 2 VIEWS: CPT

## 2024-07-29 RX ORDER — LACTULOSE 10 G/15ML
20 SOLUTION ORAL 2 TIMES DAILY PRN
COMMUNITY
Start: 2024-07-25 | End: 2024-07-31

## 2024-07-29 RX ORDER — HYDROCODONE BITARTRATE AND ACETAMINOPHEN 5; 325 MG/1; MG/1
1 TABLET ORAL EVERY 8 HOURS PRN
Qty: 21 TABLET | Refills: 0 | Status: SHIPPED | OUTPATIENT
Start: 2024-07-29 | End: 2024-08-05

## 2024-07-29 NOTE — PROGRESS NOTES
Reason For Visit:   Acute visit for Neck and back pain.     Combined HPI and A/P:      Diagnosis Orders   1. Chronic midline low back pain without sciatica  HYDROcodone-acetaminophen (NORCO) 5-325 MG per tablet    XR LUMBAR SPINE (2-3 VIEWS)      2. Chronic bilateral thoracic back pain  HYDROcodone-acetaminophen (NORCO) 5-325 MG per tablet    XR THORACIC SPINE (2 VIEWS)      3. Rib pain  XR RIBS LEFT (2 VIEWS)          1.) Constipation:     - The patient has known constipation. The patient is on several medications that can increased the risk for constipation. He went to the ED recently for abdominal pain. The imaging showed a large stool burden. We discussed the need to use Miralax to help with this.     2.) Chronic C- Spine and T spine pain:     - The patient has a chronic hx of pain of his T spine and C spine. He does not take medication for this usually as his HIV medications will interact with most OTC medications. He has increased pain over the last several days. He did not have a particular injury, but had been working out. He was doing leg presses, and started having increased pain of his back and neck. I will prescribe a short course of Norco to help with the pain as this resolves back to his base line. I will order xrays of his C spine, T spine, and L spine.   -  - Pertienty Imaging:  - XR neck 3/8/23:  There is moderate narrowing of the C5-6 disc space with   anterior spurring. The disc narrowing is new at this level. There is   moderate to severe narrowing of the C6-7 disc space with anterior   spurring, stable. There is anterior spurring at C4-5. There is facet   arthropathy posteriorly. There is foraminal narrowing bilaterally at   C6-7 and on the left at C5-6. No fracture is seen. There is   straightening on the lateral images. There is no prevertebral soft   tissue swelling. There is probable carotid artery calcification in the   neck.   - XR L-Spine 8/17/22:  There is mild to moderate narrowing

## 2024-08-01 ENCOUNTER — HOSPITAL ENCOUNTER (OUTPATIENT)
Dept: ULTRASOUND IMAGING | Facility: HOSPITAL | Age: 54
Discharge: HOME | End: 2024-08-01
Admitting: NURSE PRACTITIONER
Payer: COMMERCIAL

## 2024-08-01 DIAGNOSIS — K74.00 HEPATIC FIBROSIS: ICD-10-CM

## 2024-08-01 PROCEDURE — 76700 US EXAM ABDOM COMPLETE: CPT

## 2024-08-05 ENCOUNTER — OFFICE VISIT (OUTPATIENT)
Dept: GASTROENTEROLOGY | Facility: CLINIC | Age: 54
End: 2024-08-05
Payer: COMMERCIAL

## 2024-08-05 VITALS
SYSTOLIC BLOOD PRESSURE: 104 MMHG | HEART RATE: 74 BPM | DIASTOLIC BLOOD PRESSURE: 60 MMHG | TEMPERATURE: 98 F | OXYGEN SATURATION: 99 % | HEIGHT: 67 IN | BODY MASS INDEX: 24.48 KG/M2 | WEIGHT: 156 LBS

## 2024-08-05 DIAGNOSIS — R13.14 PHARYNGOESOPHAGEAL DYSPHAGIA: Primary | ICD-10-CM

## 2024-08-05 PROCEDURE — 99214 OFFICE O/P EST MOD 30 MIN: CPT | Performed by: NURSE PRACTITIONER

## 2024-08-05 PROCEDURE — 1159F MED LIST DOCD IN RCRD: CPT | Performed by: NURSE PRACTITIONER

## 2024-08-05 PROCEDURE — 1160F RVW MEDS BY RX/DR IN RCRD: CPT | Performed by: NURSE PRACTITIONER

## 2024-08-05 RX ORDER — HYDROCODONE BITARTRATE AND ACETAMINOPHEN 5; 325 MG/1; MG/1
1 TABLET ORAL EVERY 6 HOURS PRN
COMMUNITY

## 2024-08-05 ASSESSMENT — ENCOUNTER SYMPTOMS
TROUBLE SWALLOWING: 0
CONSTIPATION: 0
SHORTNESS OF BREATH: 0
COUGH: 0
DIARRHEA: 0
VOMITING: 0
ABDOMINAL PAIN: 0
BACK PAIN: 1
NAUSEA: 0

## 2024-08-05 NOTE — PROGRESS NOTES
[facility] GASTROENTEROLOGY - OFFICE NOTE    8/5/2024    Alex Ghotra   1970    Primary Physician: Basil Santiago MD    Referring Provider: Ila Crump     Chief Complaint   Patient presents with    Abdominal Pain     Dysphagia     HISTORY OF PRESENT ILLNESS:    Alex Ghotra is a 54 y.o. male presents  with sliding hiatal hernia and abdominal pain.  Referred by ENT. He says that ID sent him here as well.       Abdominal pain  He denies abdominal pain. He was having pain in the left lower rib area. He was told had fractured rib. Had been taking pain medication for the rib pain.     He recently had constipation while taking pain medication for fractured rib. He is off pain medication now. He is having a bm daily now. No rectal bleeding. No weight loss. No fever.       He is on nexium daily with good control of gerd.  He has had dysphagia intermittent for 2 years. He points to the neck area where food will hang. No chest pain.         CT Abdomen Pelvis With Contrast (07/25/2024 22:38)   XR Abdomen KUB (07/25/2024 17:12)   FL ESOPHAGRAM SINGLE CONTRAST (07/22/2024 08:57 EDT) sliding hiatal hernia.   US Abdomen Complete (08/01/2024 08:14)       UPPER GI ENDOSCOPY (03/14/2023 13:17)   Tissue Pathology Exam (03/14/2023 13:31)     COLONOSCOPY (06/09/2022 11:11) recall 10 years.           Past Medical History:   Diagnosis Date    AIDS     Asthma     COPD (chronic obstructive pulmonary disease)     Disease of thyroid gland     GERD (gastroesophageal reflux disease)     Hep C w/ coma, chronic     Hyperlipidemia        Past Surgical History:   Procedure Laterality Date    APPENDECTOMY      COLONOSCOPY      2013? ([facility]) polyps    COLONOSCOPY N/A 06/09/2022    Procedure: COLONOSCOPY WITH ANESTHESIA;  Surgeon: Michael Israel MD;  Location: [facility] ENDOSCOPY;  Service: Gastroenterology;  Laterality: N/A;  pre hx colon polyp  post; normal  Alice Anderson MD    ENDOSCOPY       ENDOSCOPY N/A 06/09/2022    Procedure: ESOPHAGOGASTRODUODENOSCOPY WITH ANESTHESIA;  Surgeon: Michael Israel MD;  Location: [facility] ENDOSCOPY;  Service: Gastroenterology;  Laterality: N/A;  pre dysphagia  post candidiasis; stricture dilated  Alice Anderson MD    ENDOSCOPY N/A 03/14/2023    Procedure: ESOPHAGOGASTRODUODENOSCOPY WITH ANESTHESIA;  Surgeon: Michael Israel MD;  Location: [facility] ENDOSCOPY;  Service: Gastroenterology;  Laterality: N/A;  Pre: Epigastric pain, Dysphagia, Abnormal CT of the abdomen  Post: esophagitis   Alice Anderson MD    MANDIBLE FRACTURE SURGERY         Outpatient Medications Marked as Taking for the 8/5/24 encounter (Office Visit) with Latricia Camacho APRN   Medication Sig Dispense Refill    albuterol (ACCUNEB) 1.25 MG/3ML nebulizer solution Take 3 mL by nebulization Every 6 (Six) Hours As Needed for Shortness of Air. 120 mL 0    albuterol sulfate  (90 Base) MCG/ACT inhaler Inhale 2 puffs Every 4 (Four) Hours As Needed for Wheezing or Shortness of Air (Rinse Mouth after use). 6.7 g 0    buPROPion XL (WELLBUTRIN XL) 300 MG 24 hr tablet Take 1 tablet by mouth Every Morning.      Cholecalciferol (Vitamin D3) 50 MCG (2000 UT) capsule Take 1 capsule by mouth Daily.      esomeprazole (nexIUM) 20 MG capsule Take 1 capsule by mouth Every Morning Before Breakfast.      HYDROcodone-acetaminophen (NORCO) 5-325 MG per tablet Take 1 tablet by mouth Every 6 (Six) Hours As Needed.      Juluca 50-25 MG per tablet Take 1 tablet by mouth daily; Stop Edurant, Prezcobix, Tivicay, Descovy      Prezcobix 800-150 MG per tablet Take 1 tablet by mouth Daily.      rosuvastatin (CRESTOR) 5 MG tablet Take 1 tablet by mouth Daily.      tadalafil (CIALIS) 20 MG tablet Take 1 tablet by mouth Daily As Needed.         Allergies   Allergen Reactions    Amoxicillin Other (See Comments)     unknown    Other      SOME HIV MEDICATION, DONT KNOW NAME     Remeron [Mirtazapine] Other (See Comments)  "    unknown    Ziagen [Abacavir] Other (See Comments)     unknown    Bactrim [Sulfamethoxazole-Trimethoprim] Rash       Social History     Socioeconomic History    Marital status:    Tobacco Use    Smoking status: Every Day     Current packs/day: 0.50     Average packs/day: 0.5 packs/day for 40.0 years (20.0 ttl pk-yrs)     Types: Cigarettes    Smokeless tobacco: Never   Vaping Use    Vaping status: Former    Substances: Nicotine    Devices: Disposable    Passive vaping exposure: Yes   Substance and Sexual Activity    Alcohol use: No    Drug use: Not Currently     Types: Methamphetamines, Marijuana, Heroin, LSD, Cocaine(coke)     Comment: Clean for 22 months    Sexual activity: Defer       Family History   Problem Relation Age of Onset    Heart attack Mother     Diabetes Mother     Heart attack Father     Liver cancer Father     Colon cancer Neg Hx     Colon polyps Neg Hx        Review of Systems   Constitutional:  Negative for chills, fever and unexpected weight change.   Respiratory:  Negative for shortness of breath.    Gastrointestinal:  Negative for abdominal distention, abdominal pain, anal bleeding, blood in stool, diarrhea, nausea and vomiting.        Vitals:    08/05/24 0756   BP: 104/60   Pulse: 74   Temp: 98 °F (36.7 °C)   SpO2: 99%   Weight: 70.8 kg (156 lb)   Height: 170.2 cm (67\")      Body mass index is 24.43 kg/m².    Physical Exam  Vitals reviewed.   Constitutional:       General: He is not in acute distress.  Cardiovascular:      Rate and Rhythm: Normal rate and regular rhythm.      Heart sounds: Normal heart sounds.   Pulmonary:      Effort: Pulmonary effort is normal.      Breath sounds: Normal breath sounds.   Abdominal:      General: Bowel sounds are normal. There is no distension.      Palpations: Abdomen is soft.      Tenderness: There is no abdominal tenderness.   Skin:     General: Skin is warm and dry.   Neurological:      Mental Status: He is alert.         Results for orders " placed or performed during the hospital encounter of 07/25/24   Comprehensive Metabolic Panel    Specimen: Blood   Result Value Ref Range    Glucose 72 65 - 99 mg/dL    BUN 22 (H) 6 - 20 mg/dL    Creatinine 1.09 0.76 - 1.27 mg/dL    Sodium 140 136 - 145 mmol/L    Potassium 4.2 3.5 - 5.2 mmol/L    Chloride 103 98 - 107 mmol/L    CO2 27.0 22.0 - 29.0 mmol/L    Calcium 10.0 8.6 - 10.5 mg/dL    Total Protein 8.5 6.0 - 8.5 g/dL    Albumin 4.7 3.5 - 5.2 g/dL    ALT (SGPT) 26 1 - 41 U/L    AST (SGOT) 33 1 - 40 U/L    Alkaline Phosphatase 70 39 - 117 U/L    Total Bilirubin 0.2 0.0 - 1.2 mg/dL    Globulin 3.8 gm/dL    A/G Ratio 1.2 g/dL    BUN/Creatinine Ratio 20.2 7.0 - 25.0    Anion Gap 10.0 5.0 - 15.0 mmol/L    eGFR 80.2 >60.0 mL/min/1.73   Lipase    Specimen: Blood   Result Value Ref Range    Lipase 90 (H) 13 - 60 U/L   Urinalysis With Microscopic If Indicated (No Culture) - Urine, Clean Catch    Specimen: Urine, Clean Catch   Result Value Ref Range    Color, UA Dark Yellow (A) Yellow, Straw    Appearance, UA Clear Clear    pH, UA 6.0 5.0 - 8.0    Specific Gravity, UA >1.030 (H) 1.005 - 1.030    Glucose, UA Negative Negative    Ketones, UA Trace (A) Negative    Bilirubin, UA Negative Negative    Blood, UA Negative Negative    Protein, UA Negative Negative    Leuk Esterase, UA Negative Negative    Nitrite, UA Negative Negative    Urobilinogen, UA 1.0 E.U./dL 0.2 - 1.0 E.U./dL   Lactic Acid, Plasma    Specimen: Blood   Result Value Ref Range    Lactate 0.9 0.5 - 2.0 mmol/L   CBC Auto Differential    Specimen: Blood   Result Value Ref Range    WBC 7.57 3.40 - 10.80 10*3/mm3    RBC 4.99 4.14 - 5.80 10*6/mm3    Hemoglobin 15.8 13.0 - 17.7 g/dL    Hematocrit 47.1 37.5 - 51.0 %    MCV 94.4 79.0 - 97.0 fL    MCH 31.7 26.6 - 33.0 pg    MCHC 33.5 31.5 - 35.7 g/dL    RDW 12.4 12.3 - 15.4 %    RDW-SD 43.4 37.0 - 54.0 fl    MPV 9.2 6.0 - 12.0 fL    Platelets 244 140 - 450 10*3/mm3    Neutrophil % 43.2 42.7 - 76.0 %    Lymphocyte %  42.5 19.6 - 45.3 %    Monocyte % 9.2 5.0 - 12.0 %    Eosinophil % 4.1 0.3 - 6.2 %    Basophil % 0.9 0.0 - 1.5 %    Immature Grans % 0.1 0.0 - 0.5 %    Neutrophils, Absolute 3.26 1.70 - 7.00 10*3/mm3    Lymphocytes, Absolute 3.22 (H) 0.70 - 3.10 10*3/mm3    Monocytes, Absolute 0.70 0.10 - 0.90 10*3/mm3    Eosinophils, Absolute 0.31 0.00 - 0.40 10*3/mm3    Basophils, Absolute 0.07 0.00 - 0.20 10*3/mm3    Immature Grans, Absolute 0.01 0.00 - 0.05 10*3/mm3    nRBC 0.0 0.0 - 0.2 /100 WBC   Green Top (Gel)   Result Value Ref Range    Extra Tube Hold for add-ons.    Lavender Top   Result Value Ref Range    Extra Tube hold for add-on    Red Top   Result Value Ref Range    Extra Tube Hold for add-ons.    Light Blue Top   Result Value Ref Range    Extra Tube Hold for add-ons.            ASSESSMENT AND PLAN    Assessment & Plan     Diagnoses and all orders for this visit:    1. Pharyngoesophageal dysphagia (Primary)  -     Case Request; Standing  -     Case Request    Other orders  -     Implement Anesthesia Orders Day of Procedure; Standing  -     Obtain Informed Consent; Standing             Differential diagnoses discussed.  Discussed egd and he would like to proceed. He will continue nexium daily.          ESOPHAGOGASTRODUODENOSCOPY WITH ANESTHESIA (N/A)  Risk, benefits, and alternatives of endoscopy were explained in full.  They understand that there is a risk of bleeding, perforation, and infection.  The risk of perforation goes up with esophageal dilation.  Other options to evaluate UGI complaints could involve barium swallow or UGI series, but these would be diagnostic tests only.  Patient was given time to ask questions.  I answered them to their satisfaction and they are agreeable to proceeding         No follow-ups on file.            There are no Patient Instructions on file for this visit.      Latricia Camacho, APRN

## 2024-08-05 NOTE — H&P (VIEW-ONLY)
General acute hospital GASTROENTEROLOGY - OFFICE NOTE    8/5/2024    Alex Ghotra   1970    Primary Physician: Basil Santiago MD    Referring Provider: Ila Crump     Chief Complaint   Patient presents with    Abdominal Pain     Dysphagia     HISTORY OF PRESENT ILLNESS:    Alex Ghotra is a 54 y.o. male presents  with sliding hiatal hernia and abdominal pain.  Referred by ENT. He says that ID sent him here as well.       Abdominal pain  He denies abdominal pain. He was having pain in the left lower rib area. He was told had fractured rib. Had been taking pain medication for the rib pain.     He recently had constipation while taking pain medication for fractured rib. He is off pain medication now. He is having a bm daily now. No rectal bleeding. No weight loss. No fever.       He is on nexium daily with good control of gerd.  He has had dysphagia intermittent for 2 years. He points to the neck area where food will hang. No chest pain.         CT Abdomen Pelvis With Contrast (07/25/2024 22:38)   XR Abdomen KUB (07/25/2024 17:12)   FL ESOPHAGRAM SINGLE CONTRAST (07/22/2024 08:57 EDT) sliding hiatal hernia.   US Abdomen Complete (08/01/2024 08:14)       UPPER GI ENDOSCOPY (03/14/2023 13:17)   Tissue Pathology Exam (03/14/2023 13:31)     COLONOSCOPY (06/09/2022 11:11) recall 10 years.           Past Medical History:   Diagnosis Date    AIDS     Asthma     COPD (chronic obstructive pulmonary disease)     Disease of thyroid gland     GERD (gastroesophageal reflux disease)     Hep C w/ coma, chronic     Hyperlipidemia        Past Surgical History:   Procedure Laterality Date    APPENDECTOMY      COLONOSCOPY      2013? ([facility]) polyps    COLONOSCOPY N/A 06/09/2022    Procedure: COLONOSCOPY WITH ANESTHESIA;  Surgeon: Michael Israel MD;  Location: [facility] ENDOSCOPY;  Service: Gastroenterology;  Laterality: N/A;  pre hx colon polyp  post; normal  Alice Anderson MD    ENDOSCOPY       ENDOSCOPY N/A 06/09/2022    Procedure: ESOPHAGOGASTRODUODENOSCOPY WITH ANESTHESIA;  Surgeon: Michael Israel MD;  Location: [facility] ENDOSCOPY;  Service: Gastroenterology;  Laterality: N/A;  pre dysphagia  post candidiasis; stricture dilated  Alice Anderson MD    ENDOSCOPY N/A 03/14/2023    Procedure: ESOPHAGOGASTRODUODENOSCOPY WITH ANESTHESIA;  Surgeon: Michael Israel MD;  Location: [facility] ENDOSCOPY;  Service: Gastroenterology;  Laterality: N/A;  Pre: Epigastric pain, Dysphagia, Abnormal CT of the abdomen  Post: esophagitis   Alice Anderson MD    MANDIBLE FRACTURE SURGERY         Outpatient Medications Marked as Taking for the 8/5/24 encounter (Office Visit) with Latricia Camacho APRN   Medication Sig Dispense Refill    albuterol (ACCUNEB) 1.25 MG/3ML nebulizer solution Take 3 mL by nebulization Every 6 (Six) Hours As Needed for Shortness of Air. 120 mL 0    albuterol sulfate  (90 Base) MCG/ACT inhaler Inhale 2 puffs Every 4 (Four) Hours As Needed for Wheezing or Shortness of Air (Rinse Mouth after use). 6.7 g 0    buPROPion XL (WELLBUTRIN XL) 300 MG 24 hr tablet Take 1 tablet by mouth Every Morning.      Cholecalciferol (Vitamin D3) 50 MCG (2000 UT) capsule Take 1 capsule by mouth Daily.      esomeprazole (nexIUM) 20 MG capsule Take 1 capsule by mouth Every Morning Before Breakfast.      HYDROcodone-acetaminophen (NORCO) 5-325 MG per tablet Take 1 tablet by mouth Every 6 (Six) Hours As Needed.      Juluca 50-25 MG per tablet Take 1 tablet by mouth daily; Stop Edurant, Prezcobix, Tivicay, Descovy      Prezcobix 800-150 MG per tablet Take 1 tablet by mouth Daily.      rosuvastatin (CRESTOR) 5 MG tablet Take 1 tablet by mouth Daily.      tadalafil (CIALIS) 20 MG tablet Take 1 tablet by mouth Daily As Needed.         Allergies   Allergen Reactions    Amoxicillin Other (See Comments)     unknown    Other      SOME HIV MEDICATION, DONT KNOW NAME     Remeron [Mirtazapine] Other (See Comments)  "    unknown    Ziagen [Abacavir] Other (See Comments)     unknown    Bactrim [Sulfamethoxazole-Trimethoprim] Rash       Social History     Socioeconomic History    Marital status:    Tobacco Use    Smoking status: Every Day     Current packs/day: 0.50     Average packs/day: 0.5 packs/day for 40.0 years (20.0 ttl pk-yrs)     Types: Cigarettes    Smokeless tobacco: Never   Vaping Use    Vaping status: Former    Substances: Nicotine    Devices: Disposable    Passive vaping exposure: Yes   Substance and Sexual Activity    Alcohol use: No    Drug use: Not Currently     Types: Methamphetamines, Marijuana, Heroin, LSD, Cocaine(coke)     Comment: Clean for 22 months    Sexual activity: Defer       Family History   Problem Relation Age of Onset    Heart attack Mother     Diabetes Mother     Heart attack Father     Liver cancer Father     Colon cancer Neg Hx     Colon polyps Neg Hx        Review of Systems   Constitutional:  Negative for chills, fever and unexpected weight change.   Respiratory:  Negative for shortness of breath.    Gastrointestinal:  Negative for abdominal distention, abdominal pain, anal bleeding, blood in stool, diarrhea, nausea and vomiting.        Vitals:    08/05/24 0756   BP: 104/60   Pulse: 74   Temp: 98 °F (36.7 °C)   SpO2: 99%   Weight: 70.8 kg (156 lb)   Height: 170.2 cm (67\")      Body mass index is 24.43 kg/m².    Physical Exam  Vitals reviewed.   Constitutional:       General: He is not in acute distress.  Cardiovascular:      Rate and Rhythm: Normal rate and regular rhythm.      Heart sounds: Normal heart sounds.   Pulmonary:      Effort: Pulmonary effort is normal.      Breath sounds: Normal breath sounds.   Abdominal:      General: Bowel sounds are normal. There is no distension.      Palpations: Abdomen is soft.      Tenderness: There is no abdominal tenderness.   Skin:     General: Skin is warm and dry.   Neurological:      Mental Status: He is alert.         Results for orders " placed or performed during the hospital encounter of 07/25/24   Comprehensive Metabolic Panel    Specimen: Blood   Result Value Ref Range    Glucose 72 65 - 99 mg/dL    BUN 22 (H) 6 - 20 mg/dL    Creatinine 1.09 0.76 - 1.27 mg/dL    Sodium 140 136 - 145 mmol/L    Potassium 4.2 3.5 - 5.2 mmol/L    Chloride 103 98 - 107 mmol/L    CO2 27.0 22.0 - 29.0 mmol/L    Calcium 10.0 8.6 - 10.5 mg/dL    Total Protein 8.5 6.0 - 8.5 g/dL    Albumin 4.7 3.5 - 5.2 g/dL    ALT (SGPT) 26 1 - 41 U/L    AST (SGOT) 33 1 - 40 U/L    Alkaline Phosphatase 70 39 - 117 U/L    Total Bilirubin 0.2 0.0 - 1.2 mg/dL    Globulin 3.8 gm/dL    A/G Ratio 1.2 g/dL    BUN/Creatinine Ratio 20.2 7.0 - 25.0    Anion Gap 10.0 5.0 - 15.0 mmol/L    eGFR 80.2 >60.0 mL/min/1.73   Lipase    Specimen: Blood   Result Value Ref Range    Lipase 90 (H) 13 - 60 U/L   Urinalysis With Microscopic If Indicated (No Culture) - Urine, Clean Catch    Specimen: Urine, Clean Catch   Result Value Ref Range    Color, UA Dark Yellow (A) Yellow, Straw    Appearance, UA Clear Clear    pH, UA 6.0 5.0 - 8.0    Specific Gravity, UA >1.030 (H) 1.005 - 1.030    Glucose, UA Negative Negative    Ketones, UA Trace (A) Negative    Bilirubin, UA Negative Negative    Blood, UA Negative Negative    Protein, UA Negative Negative    Leuk Esterase, UA Negative Negative    Nitrite, UA Negative Negative    Urobilinogen, UA 1.0 E.U./dL 0.2 - 1.0 E.U./dL   Lactic Acid, Plasma    Specimen: Blood   Result Value Ref Range    Lactate 0.9 0.5 - 2.0 mmol/L   CBC Auto Differential    Specimen: Blood   Result Value Ref Range    WBC 7.57 3.40 - 10.80 10*3/mm3    RBC 4.99 4.14 - 5.80 10*6/mm3    Hemoglobin 15.8 13.0 - 17.7 g/dL    Hematocrit 47.1 37.5 - 51.0 %    MCV 94.4 79.0 - 97.0 fL    MCH 31.7 26.6 - 33.0 pg    MCHC 33.5 31.5 - 35.7 g/dL    RDW 12.4 12.3 - 15.4 %    RDW-SD 43.4 37.0 - 54.0 fl    MPV 9.2 6.0 - 12.0 fL    Platelets 244 140 - 450 10*3/mm3    Neutrophil % 43.2 42.7 - 76.0 %    Lymphocyte %  42.5 19.6 - 45.3 %    Monocyte % 9.2 5.0 - 12.0 %    Eosinophil % 4.1 0.3 - 6.2 %    Basophil % 0.9 0.0 - 1.5 %    Immature Grans % 0.1 0.0 - 0.5 %    Neutrophils, Absolute 3.26 1.70 - 7.00 10*3/mm3    Lymphocytes, Absolute 3.22 (H) 0.70 - 3.10 10*3/mm3    Monocytes, Absolute 0.70 0.10 - 0.90 10*3/mm3    Eosinophils, Absolute 0.31 0.00 - 0.40 10*3/mm3    Basophils, Absolute 0.07 0.00 - 0.20 10*3/mm3    Immature Grans, Absolute 0.01 0.00 - 0.05 10*3/mm3    nRBC 0.0 0.0 - 0.2 /100 WBC   Green Top (Gel)   Result Value Ref Range    Extra Tube Hold for add-ons.    Lavender Top   Result Value Ref Range    Extra Tube hold for add-on    Red Top   Result Value Ref Range    Extra Tube Hold for add-ons.    Light Blue Top   Result Value Ref Range    Extra Tube Hold for add-ons.            ASSESSMENT AND PLAN    Assessment & Plan     Diagnoses and all orders for this visit:    1. Pharyngoesophageal dysphagia (Primary)  -     Case Request; Standing  -     Case Request    Other orders  -     Implement Anesthesia Orders Day of Procedure; Standing  -     Obtain Informed Consent; Standing             Differential diagnoses discussed.  Discussed egd and he would like to proceed. He will continue nexium daily.          ESOPHAGOGASTRODUODENOSCOPY WITH ANESTHESIA (N/A)  Risk, benefits, and alternatives of endoscopy were explained in full.  They understand that there is a risk of bleeding, perforation, and infection.  The risk of perforation goes up with esophageal dilation.  Other options to evaluate UGI complaints could involve barium swallow or UGI series, but these would be diagnostic tests only.  Patient was given time to ask questions.  I answered them to their satisfaction and they are agreeable to proceeding         No follow-ups on file.            There are no Patient Instructions on file for this visit.      Latricia Camacho, APRN

## 2024-08-06 ENCOUNTER — HOSPITAL ENCOUNTER (OUTPATIENT)
Facility: HOSPITAL | Age: 54
Setting detail: HOSPITAL OUTPATIENT SURGERY
Discharge: HOME | End: 2024-08-06
Attending: INTERNAL MEDICINE | Admitting: INTERNAL MEDICINE
Payer: COMMERCIAL

## 2024-08-06 ENCOUNTER — ANESTHESIA EVENT (OUTPATIENT)
Dept: GASTROENTEROLOGY | Facility: HOSPITAL | Age: 54
End: 2024-08-06
Payer: COMMERCIAL

## 2024-08-06 ENCOUNTER — ANESTHESIA (OUTPATIENT)
Dept: GASTROENTEROLOGY | Facility: HOSPITAL | Age: 54
End: 2024-08-06
Payer: COMMERCIAL

## 2024-08-06 VITALS
TEMPERATURE: 96.5 F | BODY MASS INDEX: 23.54 KG/M2 | HEIGHT: 67 IN | SYSTOLIC BLOOD PRESSURE: 107 MMHG | OXYGEN SATURATION: 96 % | HEART RATE: 85 BPM | DIASTOLIC BLOOD PRESSURE: 69 MMHG | WEIGHT: 150 LBS | RESPIRATION RATE: 20 BRPM

## 2024-08-06 DIAGNOSIS — R13.14 PHARYNGOESOPHAGEAL DYSPHAGIA: ICD-10-CM

## 2024-08-06 LAB — KOH PREP NAIL: ABNORMAL

## 2024-08-06 PROCEDURE — 25810000003 SODIUM CHLORIDE 0.9 % SOLUTION: Performed by: NURSE ANESTHETIST, CERTIFIED REGISTERED

## 2024-08-06 PROCEDURE — 88305 TISSUE EXAM BY PATHOLOGIST: CPT | Performed by: INTERNAL MEDICINE

## 2024-08-06 PROCEDURE — 43248 EGD GUIDE WIRE INSERTION: CPT | Performed by: INTERNAL MEDICINE

## 2024-08-06 PROCEDURE — 43239 EGD BIOPSY SINGLE/MULTIPLE: CPT | Performed by: INTERNAL MEDICINE

## 2024-08-06 PROCEDURE — 87220 TISSUE EXAM FOR FUNGI: CPT | Performed by: INTERNAL MEDICINE

## 2024-08-06 PROCEDURE — 25010000002 PROPOFOL 10 MG/ML EMULSION: Performed by: NURSE ANESTHETIST, CERTIFIED REGISTERED

## 2024-08-06 RX ORDER — LIDOCAINE HYDROCHLORIDE 10 MG/ML
0.5 INJECTION, SOLUTION EPIDURAL; INFILTRATION; INTRACAUDAL; PERINEURAL ONCE AS NEEDED
Status: DISCONTINUED | OUTPATIENT
Start: 2024-08-06 | End: 2024-08-06 | Stop reason: HOSPADM

## 2024-08-06 RX ORDER — PROPOFOL 10 MG/ML
VIAL (ML) INTRAVENOUS AS NEEDED
Status: DISCONTINUED | OUTPATIENT
Start: 2024-08-06 | End: 2024-08-06 | Stop reason: SURG

## 2024-08-06 RX ORDER — ONDANSETRON 2 MG/ML
4 INJECTION INTRAMUSCULAR; INTRAVENOUS ONCE AS NEEDED
Status: DISCONTINUED | OUTPATIENT
Start: 2024-08-06 | End: 2024-08-06 | Stop reason: HOSPADM

## 2024-08-06 RX ORDER — FLUCONAZOLE 100 MG/1
100 TABLET ORAL DAILY
Qty: 7 TABLET | Refills: 0 | Status: SHIPPED | OUTPATIENT
Start: 2024-08-06 | End: 2024-08-13

## 2024-08-06 RX ORDER — LIDOCAINE HYDROCHLORIDE 20 MG/ML
INJECTION, SOLUTION EPIDURAL; INFILTRATION; INTRACAUDAL; PERINEURAL AS NEEDED
Status: DISCONTINUED | OUTPATIENT
Start: 2024-08-06 | End: 2024-08-06 | Stop reason: SURG

## 2024-08-06 RX ORDER — SODIUM CHLORIDE 0.9 % (FLUSH) 0.9 %
10 SYRINGE (ML) INJECTION AS NEEDED
Status: DISCONTINUED | OUTPATIENT
Start: 2024-08-06 | End: 2024-08-06 | Stop reason: HOSPADM

## 2024-08-06 RX ORDER — SODIUM CHLORIDE 9 MG/ML
500 INJECTION, SOLUTION INTRAVENOUS CONTINUOUS PRN
Status: DISCONTINUED | OUTPATIENT
Start: 2024-08-06 | End: 2024-08-06 | Stop reason: HOSPADM

## 2024-08-06 RX ADMIN — LIDOCAINE HYDROCHLORIDE 100 MG: 20 INJECTION, SOLUTION EPIDURAL; INFILTRATION; INTRACAUDAL; PERINEURAL at 08:16

## 2024-08-06 RX ADMIN — PROPOFOL 30 MG: 10 INJECTION, EMULSION INTRAVENOUS at 08:21

## 2024-08-06 RX ADMIN — PROPOFOL 40 MG: 10 INJECTION, EMULSION INTRAVENOUS at 08:18

## 2024-08-06 RX ADMIN — SODIUM CHLORIDE 500 ML: 9 INJECTION, SOLUTION INTRAVENOUS at 07:53

## 2024-08-06 RX ADMIN — PROPOFOL 100 MG: 10 INJECTION, EMULSION INTRAVENOUS at 08:16

## 2024-08-06 NOTE — ANESTHESIA PREPROCEDURE EVALUATION
Anesthesia Evaluation     Patient summary reviewed and Nursing notes reviewed   no history of anesthetic complications:   NPO Solid Status: > 8 hours  NPO Liquid Status: > 8 hours           Airway   Mallampati: I  TM distance: >3 FB  Neck ROM: full  No difficulty expected  Dental    (+) edentulous    Pulmonary    (+) a smoker Current, asthma,  (-) sleep apnea  Cardiovascular   Exercise tolerance: good (4-7 METS)    (+) hyperlipidemia  (-) hypertension, past MI, CAD, dysrhythmias, cardiac stents      Neuro/Psych  (+) seizures  (-) TIA, CVA  GI/Hepatic/Renal/Endo    (+) hepatitis C, liver disease, thyroid problem   (-) no renal disease, diabetes    ROS Comment: HIV     Musculoskeletal     Abdominal    Substance History      OB/GYN          Other   autoimmune disease (HIV) ,                   Anesthesia Plan    ASA 3     MAC     intravenous induction     Anesthetic plan, risks, benefits, and alternatives have been provided, discussed and informed consent has been obtained with: patient.      CODE STATUS:

## 2024-08-06 NOTE — ANESTHESIA POSTPROCEDURE EVALUATION
"Patient: Alex Ghotra    Procedure Summary       Date: 08/06/24 Room / Location: [facility] ENDOSCOPY 2 / [facility] ENDOSCOPY    Anesthesia Start: 0814 Anesthesia Stop: 0829    Procedure: ESOPHAGOGASTRODUODENOSCOPY WITH ANESTHESIA Diagnosis:       Pharyngoesophageal dysphagia      (Pharyngoesophageal dysphagia [R13.14])    Surgeons: Michael Israel MD Provider: Brandon Lyn CRNA    Anesthesia Type: MAC ASA Status: 3            Anesthesia Type: MAC    Vitals  Vitals Value Taken Time   /71 08/06/24 0828   Temp     Pulse 90 08/06/24 0829   Resp 21 08/06/24 0828   SpO2 97 % 08/06/24 0829   Vitals shown include unfiled device data.        Post Anesthesia Care and Evaluation    Patient location during evaluation: PHASE II  Patient participation: complete - patient participated  Level of consciousness: awake  Pain score: 0  Pain management: adequate    Airway patency: patent  Anesthetic complications: No anesthetic complications  PONV Status: none  Cardiovascular status: acceptable  Respiratory status: acceptable  Hydration status: acceptable    Comments: Blood pressure 100/71, pulse 89, temperature 96.5 °F (35.8 °C), temperature source Tympanic, resp. rate 21, height 170.2 cm (67\"), weight 68 kg (150 lb), SpO2 97%.      "

## 2024-08-12 ENCOUNTER — OFFICE VISIT (OUTPATIENT)
Dept: PRIMARY CARE CLINIC | Age: 54
End: 2024-08-12
Payer: MEDICAID

## 2024-08-12 VITALS
HEART RATE: 91 BPM | OXYGEN SATURATION: 97 % | SYSTOLIC BLOOD PRESSURE: 128 MMHG | BODY MASS INDEX: 24.33 KG/M2 | WEIGHT: 155 LBS | HEIGHT: 67 IN | DIASTOLIC BLOOD PRESSURE: 78 MMHG

## 2024-08-12 DIAGNOSIS — S22.31XG CLOSED FRACTURE OF ONE RIB OF RIGHT SIDE WITH DELAYED HEALING, SUBSEQUENT ENCOUNTER: Primary | ICD-10-CM

## 2024-08-12 PROCEDURE — 3017F COLORECTAL CA SCREEN DOC REV: CPT | Performed by: FAMILY MEDICINE

## 2024-08-12 PROCEDURE — 4004F PT TOBACCO SCREEN RCVD TLK: CPT | Performed by: FAMILY MEDICINE

## 2024-08-12 PROCEDURE — 99214 OFFICE O/P EST MOD 30 MIN: CPT | Performed by: FAMILY MEDICINE

## 2024-08-12 PROCEDURE — G8420 CALC BMI NORM PARAMETERS: HCPCS | Performed by: FAMILY MEDICINE

## 2024-08-12 PROCEDURE — G8427 DOCREV CUR MEDS BY ELIG CLIN: HCPCS | Performed by: FAMILY MEDICINE

## 2024-08-12 RX ORDER — HYDROCODONE BITARTRATE AND ACETAMINOPHEN 5; 325 MG/1; MG/1
1 TABLET ORAL EVERY 8 HOURS PRN
Qty: 21 TABLET | Refills: 0 | Status: SHIPPED | OUTPATIENT
Start: 2024-08-12 | End: 2024-08-19

## 2024-08-12 NOTE — PROGRESS NOTES
Reason For Visit:   Acute visit for rib pain    Combined HPI and A/P:      Diagnosis Orders   1. Closed fracture of one rib of right side with delayed healing, subsequent encounter  HYDROcodone-acetaminophen (NORCO) 5-325 MG per tablet    DEXA BONE DENSITY 2 SITES          1.) Rib Fracture:      -The patient has had continued pain of his left ribs.  His x-ray on 729 showed an anterior left ninth rib fracture.  He does not remember any injury to this area.  Due to his fracture, and taking high risk medications.  I will order a DEXA scan to evaluate for osteoporosis.      - T spine XR 7/29/24:          1. Acute anterior left ninth rib fracture with minimal displacement.           Return in about 3 months (around 11/12/2024).    We discussed use, benefit, and side effects of prescribed medications.  All patient questions answered.   Patient agreed with treatment plan.   I reviewed available records in our system and care everywhere. In cases where records are not available, the records have been requested and will be reviewed when available.     Subjective      Past Surgical History:   Procedure Laterality Date    APPENDECTOMY      COLONOSCOPY  12/09/2008    Dr Rodas    MANDIBLE SURGERY Bilateral     had it wired shut    OTHER SURGICAL HISTORY  7/24/13, 1/24/14    cauterization of per-anal condyloma     Family History   Problem Relation Age of Onset    Diabetes Mother     Heart Disease Father     Cancer Father         liver    Lung Cancer Other         uncles on both sides     Social History     Tobacco Use    Smoking status: Every Day     Current packs/day: 1.50     Average packs/day: 1.5 packs/day for 34.0 years (51.0 ttl pk-yrs)     Types: Cigarettes    Smokeless tobacco: Never   Substance Use Topics    Alcohol use: No      Current Outpatient Medications   Medication Sig Dispense Refill    HYDROcodone-acetaminophen (NORCO) 5-325 MG per tablet Take 1 tablet by mouth every 8 hours as needed for Pain for up to

## 2024-08-14 ENCOUNTER — OFFICE VISIT (OUTPATIENT)
Dept: ENT CLINIC | Age: 54
End: 2024-08-14
Payer: MEDICAID

## 2024-08-14 VITALS — SYSTOLIC BLOOD PRESSURE: 118 MMHG | DIASTOLIC BLOOD PRESSURE: 72 MMHG

## 2024-08-14 DIAGNOSIS — M54.2 TENDERNESS OF NECK: ICD-10-CM

## 2024-08-14 DIAGNOSIS — H66.92 ACUTE OTITIS MEDIA, LEFT: Primary | ICD-10-CM

## 2024-08-14 PROCEDURE — 99213 OFFICE O/P EST LOW 20 MIN: CPT | Performed by: NURSE PRACTITIONER

## 2024-08-14 RX ORDER — PSEUDOEPHEDRINE HCL 120 MG/1
120 TABLET, FILM COATED, EXTENDED RELEASE ORAL EVERY 12 HOURS
Qty: 14 TABLET | Refills: 0 | Status: SHIPPED | OUTPATIENT
Start: 2024-08-14 | End: 2024-08-21

## 2024-08-14 RX ORDER — LEVOFLOXACIN 500 MG/1
500 TABLET, FILM COATED ORAL DAILY
Qty: 10 TABLET | Refills: 0 | Status: SHIPPED | OUTPATIENT
Start: 2024-08-14 | End: 2024-08-24

## 2024-08-14 ASSESSMENT — ENCOUNTER SYMPTOMS
RESPIRATORY NEGATIVE: 1
EYES NEGATIVE: 1
GASTROINTESTINAL NEGATIVE: 1
ALLERGIC/IMMUNOLOGIC NEGATIVE: 1

## 2024-08-14 NOTE — PROGRESS NOTES
2024    Preston Hernandez (:  1970) is a 54 y.o. male, Established patient, here for evaluation of the following chief complaint(s):  Follow-up (Left ear)      Vitals:    24 0947   BP: 118/72       Wt Readings from Last 3 Encounters:   24 70.3 kg (155 lb)   24 69.4 kg (153 lb)   24 69.9 kg (154 lb)       BP Readings from Last 3 Encounters:   24 118/72   24 128/78   24 122/78         SUBJECTIVE/OBJECTIVE:    Patient seen today for follow-up of his ear.  He was given antibiotics and steroids at his last visit.  He states that his left ear no longer hurts.  He does complain of intermittent fullness in his left ear.  He denies muffled hearing but states he has not been able to hear good out of his left ear since he was a child.  He did see GI.  They did an EGD on him and stretched his esophagus.  He feels like his swallowing has improved since he stretched his esophagus.  He does complain of intermittent pain in the left side of his neck and reports he has had this issues for years.  He states it is not all the time but happens frequently.  He denies sore throat.  He states he is still using the ipratropium nasal spray and feels this is doing well for the drainage down his throat.        Review of Systems   Constitutional: Negative.    HENT:  Positive for ear pain (left ear fullness).    Eyes: Negative.    Respiratory: Negative.     Cardiovascular: Negative.    Gastrointestinal: Negative.    Endocrine: Negative.    Musculoskeletal:  Positive for neck pain.   Skin: Negative.    Allergic/Immunologic: Negative.    Neurological: Negative.    Hematological: Negative.    Psychiatric/Behavioral: Negative.          Physical Exam  Vitals reviewed.   Constitutional:       Appearance: Normal appearance. He is normal weight.   HENT:      Head: Normocephalic and atraumatic.      Right Ear: Tympanic membrane, ear canal and external ear normal.      Left Ear: Ear canal and

## 2024-08-15 ENCOUNTER — HOSPITAL ENCOUNTER (OUTPATIENT)
Dept: CT IMAGING | Age: 54
Discharge: HOME | End: 2024-08-15
Payer: MEDICAID

## 2024-08-15 DIAGNOSIS — M54.2 TENDERNESS OF NECK: ICD-10-CM

## 2024-08-15 PROCEDURE — 70491 CT SOFT TISSUE NECK W/DYE: CPT

## 2024-08-15 PROCEDURE — 6360000004 HC RX CONTRAST MEDICATION: Performed by: NURSE PRACTITIONER

## 2024-08-15 RX ADMIN — IOPAMIDOL 70 ML: 755 INJECTION, SOLUTION INTRAVENOUS at 14:54

## 2024-08-19 ASSESSMENT — ENCOUNTER SYMPTOMS
ABDOMINAL PAIN: 0
DIARRHEA: 0
COUGH: 0
CONSTIPATION: 0
SHORTNESS OF BREATH: 0
VOMITING: 0
NAUSEA: 0
TROUBLE SWALLOWING: 0

## 2024-08-27 ENCOUNTER — TELEPHONE (OUTPATIENT)
Dept: ENT CLINIC | Age: 54
End: 2024-08-27

## 2024-08-28 ENCOUNTER — TELEPHONE (OUTPATIENT)
Dept: ENT CLINIC | Age: 54
End: 2024-08-28

## 2024-08-28 DIAGNOSIS — I70.90 ATHEROSCLEROSIS: Primary | ICD-10-CM

## 2024-08-28 NOTE — TELEPHONE ENCOUNTER
Called patient and reviewed CT scan. CT showed no acute abnormality in neck but does show mild scatter atherosclerotic calcifications. We will refer him to a vascular doctor for further evaluation. He voiced understanding and is agreeable to this.

## 2024-08-30 ENCOUNTER — HOSPITAL ENCOUNTER (OUTPATIENT)
Dept: WOMENS IMAGING | Age: 54
Discharge: HOME | End: 2024-08-30
Payer: MEDICAID

## 2024-08-30 DIAGNOSIS — S22.31XG CLOSED FRACTURE OF ONE RIB OF RIGHT SIDE WITH DELAYED HEALING, SUBSEQUENT ENCOUNTER: ICD-10-CM

## 2024-08-30 PROCEDURE — 77080 DXA BONE DENSITY AXIAL: CPT

## 2024-09-03 ENCOUNTER — TELEPHONE (OUTPATIENT)
Dept: CARDIOLOGY | Facility: CLINIC | Age: 54
End: 2024-09-03
Payer: COMMERCIAL

## 2024-09-03 NOTE — TELEPHONE ENCOUNTER
Patient states Dr. Brad Martinez DMD is requesting Cardiac Clearance for oral surgery. Patient is not on anticoagulation.

## 2024-09-11 ENCOUNTER — OFFICE VISIT (OUTPATIENT)
Dept: ENT CLINIC | Age: 54
End: 2024-09-11
Payer: MEDICAID

## 2024-09-11 VITALS
WEIGHT: 150 LBS | DIASTOLIC BLOOD PRESSURE: 78 MMHG | BODY MASS INDEX: 23.54 KG/M2 | SYSTOLIC BLOOD PRESSURE: 112 MMHG | HEIGHT: 67 IN

## 2024-09-11 DIAGNOSIS — R09.81 NASAL CONGESTION: ICD-10-CM

## 2024-09-11 DIAGNOSIS — H69.92 DYSFUNCTION OF LEFT EUSTACHIAN TUBE: Primary | ICD-10-CM

## 2024-09-11 DIAGNOSIS — H91.92 HEARING LOSS OF LEFT EAR, UNSPECIFIED HEARING LOSS TYPE: ICD-10-CM

## 2024-09-11 DIAGNOSIS — H93.12 TINNITUS, LEFT: ICD-10-CM

## 2024-09-11 DIAGNOSIS — R09.82 POSTNASAL DRIP: ICD-10-CM

## 2024-09-11 DIAGNOSIS — H73.892 ERYTHEMA OF TYMPANIC MEMBRANE OF LEFT EAR: ICD-10-CM

## 2024-09-11 PROCEDURE — 99214 OFFICE O/P EST MOD 30 MIN: CPT | Performed by: NURSE PRACTITIONER

## 2024-09-11 RX ORDER — OFLOXACIN 3 MG/ML
5 SOLUTION AURICULAR (OTIC) 2 TIMES DAILY
Qty: 10 ML | Refills: 0 | Status: SHIPPED | OUTPATIENT
Start: 2024-09-11 | End: 2024-09-21

## 2024-09-11 ASSESSMENT — ENCOUNTER SYMPTOMS
EYES NEGATIVE: 1
RESPIRATORY NEGATIVE: 1
GASTROINTESTINAL NEGATIVE: 1
ALLERGIC/IMMUNOLOGIC NEGATIVE: 1

## 2024-09-18 ENCOUNTER — OFFICE VISIT (OUTPATIENT)
Age: 54
End: 2024-09-18
Payer: MEDICAID

## 2024-09-18 VITALS
OXYGEN SATURATION: 96 % | HEART RATE: 95 BPM | HEIGHT: 67 IN | WEIGHT: 152.2 LBS | BODY MASS INDEX: 23.89 KG/M2 | SYSTOLIC BLOOD PRESSURE: 122 MMHG | DIASTOLIC BLOOD PRESSURE: 72 MMHG | TEMPERATURE: 97.1 F | RESPIRATION RATE: 18 BRPM

## 2024-09-18 DIAGNOSIS — J06.9 UPPER RESPIRATORY TRACT INFECTION, UNSPECIFIED TYPE: Primary | ICD-10-CM

## 2024-09-18 DIAGNOSIS — Z11.59 SCREENING FOR VIRAL DISEASE: ICD-10-CM

## 2024-09-18 LAB
INFLUENZA A ANTIBODY: NORMAL
INFLUENZA B ANTIBODY: NORMAL
Lab: NORMAL
QC PASS/FAIL: NORMAL
SARS-COV-2, POC: NORMAL

## 2024-09-18 PROCEDURE — G8427 DOCREV CUR MEDS BY ELIG CLIN: HCPCS

## 2024-09-18 PROCEDURE — 87811 SARS-COV-2 COVID19 W/OPTIC: CPT

## 2024-09-18 PROCEDURE — G8420 CALC BMI NORM PARAMETERS: HCPCS

## 2024-09-18 PROCEDURE — 4004F PT TOBACCO SCREEN RCVD TLK: CPT

## 2024-09-18 PROCEDURE — 87804 INFLUENZA ASSAY W/OPTIC: CPT

## 2024-09-18 PROCEDURE — 3017F COLORECTAL CA SCREEN DOC REV: CPT

## 2024-09-18 PROCEDURE — 99213 OFFICE O/P EST LOW 20 MIN: CPT

## 2024-09-18 RX ORDER — AZITHROMYCIN 250 MG/1
TABLET, FILM COATED ORAL
Qty: 6 TABLET | Refills: 0 | Status: SHIPPED | OUTPATIENT
Start: 2024-09-18 | End: 2024-09-28

## 2024-09-18 ASSESSMENT — ENCOUNTER SYMPTOMS
RHINORRHEA: 0
SHORTNESS OF BREATH: 1
COUGH: 0
SORE THROAT: 0
WHEEZING: 1
ABDOMINAL PAIN: 0
NAUSEA: 0
DIARRHEA: 0
VOMITING: 0

## 2024-09-27 ENCOUNTER — OFFICE VISIT (OUTPATIENT)
Dept: PRIMARY CARE CLINIC | Age: 54
End: 2024-09-27

## 2024-09-27 VITALS
SYSTOLIC BLOOD PRESSURE: 118 MMHG | BODY MASS INDEX: 24.2 KG/M2 | DIASTOLIC BLOOD PRESSURE: 72 MMHG | OXYGEN SATURATION: 99 % | HEIGHT: 67 IN | WEIGHT: 154.2 LBS | HEART RATE: 88 BPM

## 2024-09-27 DIAGNOSIS — Z87.891 PERSONAL HISTORY OF TOBACCO USE: Primary | ICD-10-CM

## 2024-09-27 DIAGNOSIS — M54.50 CHRONIC MIDLINE LOW BACK PAIN WITHOUT SCIATICA: ICD-10-CM

## 2024-09-27 DIAGNOSIS — R39.11 URINARY HESITANCY: ICD-10-CM

## 2024-09-27 DIAGNOSIS — R79.89 ABNORMAL TSH: ICD-10-CM

## 2024-09-27 DIAGNOSIS — G89.29 CHRONIC MIDLINE LOW BACK PAIN WITHOUT SCIATICA: ICD-10-CM

## 2024-09-27 DIAGNOSIS — R53.82 CHRONIC FATIGUE: ICD-10-CM

## 2024-09-27 LAB
FOLATE SERPL-MCNC: >20 NG/ML (ref 4.5–32.2)
PSA SERPL-MCNC: 0.37 NG/ML (ref 0–4)
T3FREE SERPL-MCNC: 3.2 PG/ML (ref 2–4.4)
T4 FREE SERPL-MCNC: 1.02 NG/DL (ref 0.93–1.7)
TSH SERPL DL<=0.005 MIU/L-ACNC: 2.88 UIU/ML (ref 0.27–4.2)
VIT B12 SERPL-MCNC: 1256 PG/ML (ref 232–1245)

## 2024-09-27 RX ORDER — IBUPROFEN 800 MG/1
800 TABLET, FILM COATED ORAL 2 TIMES DAILY PRN
Qty: 180 TABLET | Refills: 3 | Status: SHIPPED | OUTPATIENT
Start: 2024-09-27

## 2024-09-27 NOTE — PROGRESS NOTES
Discussed with the patient the current USPSTF guidelines released March 9, 2021 for screening for lung cancer.    For adults aged 50 to 80 years who have a 20 pack-year smoking history and currently smoke or have quit within the past 15 years the grade B recommendation is to:  Screen for lung cancer with low-dose computed tomography (LDCT) every year.  Stop screening once a person has not smoked for 15 years or has a health problem that limits life expectancy or the ability to have lung surgery.    The patient  reports that he has been smoking cigarettes. He has a 51 pack-year smoking history. He has never used smokeless tobacco.. Discussed with patient the risks and benefits of screening, including over-diagnosis, false positive rate, and total radiation exposure.  The patient currently exhibits no signs or symptoms suggestive of lung cancer.  Discussed with patient the importance of compliance with yearly annual lung cancer screenings and willingness to undergo diagnosis and treatment if screening scan is positive.  In addition, the patient was counseled regarding the importance of remaining smoke free and/or total smoking cessation.    Also reviewed the following if the patient has Medicare that as of February 10, 2022, Medicare only covers LDCT screening in patients aged 50-77 with at least a 20 pack-year smoking history who currently smoke or have quit in the last 15 years  
Other (See Comments)     unknown    Asa [Aspirin]      Sent him to ER    Mirtazapine Other (See Comments)     unknown    Other      SOME HIV MEDICATION, DONT KNOW NAME     Ziagen [Abacavir]     Bactrim [Sulfamethoxazole-Trimethoprim] Rash       Review of Systems   Constitutional:  Positive for fatigue. Negative for chills and fever.   HENT:  Negative for hearing loss and trouble swallowing.    Eyes:  Negative for visual disturbance.   Respiratory:  Negative for cough and shortness of breath.    Cardiovascular:  Negative for chest pain and palpitations.   Gastrointestinal:  Negative for abdominal pain, constipation, diarrhea, nausea and vomiting.   Musculoskeletal:  Positive for arthralgias.   Skin:  Negative for rash and wound.   Neurological:  Negative for dizziness and syncope.   Psychiatric/Behavioral:  Positive for dysphoric mood. The patient is nervous/anxious.          Objective:   /72   Pulse 88   Ht 1.702 m (5' 7.01\")   Wt 69.9 kg (154 lb 3.2 oz)   SpO2 99%   BMI 24.15 kg/m²   Physical Exam  Vitals and nursing note reviewed.   Constitutional:       General: He is not in acute distress.     Appearance: Normal appearance.   HENT:      Head: Normocephalic and atraumatic.   Cardiovascular:      Rate and Rhythm: Normal rate and regular rhythm.   Pulmonary:      Effort: Pulmonary effort is normal.      Breath sounds: Normal breath sounds. No wheezing.   Chest:      Chest wall: No tenderness.   Abdominal:      General: Abdomen is flat. Bowel sounds are normal.      Tenderness: There is no abdominal tenderness.   Musculoskeletal:         General: Tenderness (Tenderness over his lower back and left thumb.) present.   Skin:     General: Skin is warm and dry.   Neurological:      Mental Status: He is alert.   Psychiatric:         Mood and Affect: Mood normal.         Behavior: Behavior normal.         Thought Content: Thought content normal.           No results found for this visit on

## 2024-09-27 NOTE — PATIENT INSTRUCTIONS
follow-up, he or she will help you understand what to do next.  After a lung cancer screening, you can go back to your usual activities right away.  A lung cancer screening test can't tell if you have lung cancer. If your results are positive, your doctor can't tell whether an abnormal finding is a harmless nodule, cancer, or something else without doing more tests.  What can you do to help prevent lung cancer?  Some lung cancers can't be prevented. But if you smoke, quitting smoking is the best step you can take to prevent lung cancer. If you want to quit, your doctor can recommend medicines or other ways to help.  Follow-up care is a key part of your treatment and safety. Be sure to make and go to all appointments, and call your doctor if you are having problems. It's also a good idea to know your test results and keep a list of the medicines you take.  Where can you learn more?  Go to https://www.[employer].net/patientEd and enter Q940 to learn more about \"Learning About Lung Cancer Screening.\"  Current as of: October 25, 2023  Content Version: 14.1  © 0329-0645 [employer].   Care instructions adapted under license by [employer]. If you have questions about a medical condition or this instruction, always ask your healthcare professional. [employer] disclaims any warranty or liability for your use of this information.

## 2024-10-06 ENCOUNTER — HOSPITAL ENCOUNTER (EMERGENCY)
Facility: HOSPITAL | Age: 54
Discharge: HOME | End: 2024-10-06
Admitting: EMERGENCY MEDICINE
Payer: COMMERCIAL

## 2024-10-06 ENCOUNTER — APPOINTMENT (OUTPATIENT)
Dept: GENERAL RADIOLOGY | Facility: HOSPITAL | Age: 54
End: 2024-10-06
Payer: COMMERCIAL

## 2024-10-06 ENCOUNTER — APPOINTMENT (OUTPATIENT)
Dept: CT IMAGING | Facility: HOSPITAL | Age: 54
End: 2024-10-06
Payer: COMMERCIAL

## 2024-10-06 VITALS
HEART RATE: 111 BPM | BODY MASS INDEX: 24.19 KG/M2 | OXYGEN SATURATION: 97 % | SYSTOLIC BLOOD PRESSURE: 115 MMHG | WEIGHT: 154.1 LBS | TEMPERATURE: 100.5 F | HEIGHT: 67 IN | DIASTOLIC BLOOD PRESSURE: 71 MMHG | RESPIRATION RATE: 18 BRPM

## 2024-10-06 DIAGNOSIS — B34.9 VIRAL ILLNESS: ICD-10-CM

## 2024-10-06 DIAGNOSIS — R50.9 FEVER, UNSPECIFIED FEVER CAUSE: Primary | ICD-10-CM

## 2024-10-06 LAB
ALBUMIN SERPL-MCNC: 4.4 G/DL (ref 3.5–5.2)
ALBUMIN/GLOB SERPL: 1.3 G/DL
ALP SERPL-CCNC: 73 U/L (ref 39–117)
ALT SERPL W P-5'-P-CCNC: 21 U/L (ref 1–41)
AMPHET+METHAMPHET UR QL: NEGATIVE
AMPHETAMINES UR QL: NEGATIVE
ANION GAP SERPL CALCULATED.3IONS-SCNC: 12 MMOL/L (ref 5–15)
AST SERPL-CCNC: 19 U/L (ref 1–40)
B PARAPERT DNA SPEC QL NAA+PROBE: NOT DETECTED
B PERT DNA SPEC QL NAA+PROBE: NOT DETECTED
BACTERIA UR QL AUTO: ABNORMAL /HPF
BARBITURATES UR QL SCN: NEGATIVE
BASOPHILS # BLD AUTO: 0.03 10*3/MM3 (ref 0–0.2)
BASOPHILS NFR BLD AUTO: 0.3 % (ref 0–1.5)
BENZODIAZ UR QL SCN: NEGATIVE
BILIRUB SERPL-MCNC: 0.4 MG/DL (ref 0–1.2)
BILIRUB UR QL STRIP: NEGATIVE
BUN SERPL-MCNC: 20 MG/DL (ref 6–20)
BUN/CREAT SERPL: 18.2 (ref 7–25)
BUPRENORPHINE SERPL-MCNC: NEGATIVE NG/ML
C PNEUM DNA NPH QL NAA+NON-PROBE: NOT DETECTED
CALCIUM SPEC-SCNC: 9.1 MG/DL (ref 8.6–10.5)
CANNABINOIDS SERPL QL: NEGATIVE
CHLORIDE SERPL-SCNC: 104 MMOL/L (ref 98–107)
CLARITY UR: CLEAR
CO2 SERPL-SCNC: 22 MMOL/L (ref 22–29)
COCAINE UR QL: NEGATIVE
COLOR UR: ABNORMAL
CREAT SERPL-MCNC: 1.1 MG/DL (ref 0.76–1.27)
D-LACTATE SERPL-SCNC: 1.9 MMOL/L (ref 0.5–2)
DEPRECATED RDW RBC AUTO: 42.4 FL (ref 37–54)
EGFRCR SERPLBLD CKD-EPI 2021: 79.3 ML/MIN/1.73
EOSINOPHIL # BLD AUTO: 0.22 10*3/MM3 (ref 0–0.4)
EOSINOPHIL NFR BLD AUTO: 2.2 % (ref 0.3–6.2)
ERYTHROCYTE [DISTWIDTH] IN BLOOD BY AUTOMATED COUNT: 12.2 % (ref 12.3–15.4)
ETHANOL UR QL: <0.01 %
FENTANYL UR-MCNC: NEGATIVE NG/ML
FLUAV SUBTYP SPEC NAA+PROBE: NOT DETECTED
FLUBV RNA ISLT QL NAA+PROBE: NOT DETECTED
GLOBULIN UR ELPH-MCNC: 3.5 GM/DL
GLUCOSE SERPL-MCNC: 109 MG/DL (ref 65–99)
GLUCOSE UR STRIP-MCNC: NEGATIVE MG/DL
HADV DNA SPEC NAA+PROBE: NOT DETECTED
HCOV 229E RNA SPEC QL NAA+PROBE: NOT DETECTED
HCOV HKU1 RNA SPEC QL NAA+PROBE: NOT DETECTED
HCOV NL63 RNA SPEC QL NAA+PROBE: NOT DETECTED
HCOV OC43 RNA SPEC QL NAA+PROBE: NOT DETECTED
HCT VFR BLD AUTO: 51.2 % (ref 37.5–51)
HGB BLD-MCNC: 17.6 G/DL (ref 13–17.7)
HGB UR QL STRIP.AUTO: NEGATIVE
HMPV RNA NPH QL NAA+NON-PROBE: NOT DETECTED
HOLD SPECIMEN: NORMAL
HPIV1 RNA ISLT QL NAA+PROBE: NOT DETECTED
HPIV2 RNA SPEC QL NAA+PROBE: NOT DETECTED
HPIV3 RNA NPH QL NAA+PROBE: NOT DETECTED
HPIV4 P GENE NPH QL NAA+PROBE: NOT DETECTED
HYALINE CASTS UR QL AUTO: ABNORMAL /LPF
IMM GRANULOCYTES # BLD AUTO: 0.04 10*3/MM3 (ref 0–0.05)
IMM GRANULOCYTES NFR BLD AUTO: 0.4 % (ref 0–0.5)
KETONES UR QL STRIP: ABNORMAL
LEUKOCYTE ESTERASE UR QL STRIP.AUTO: ABNORMAL
LYMPHOCYTES # BLD AUTO: 0.63 10*3/MM3 (ref 0.7–3.1)
LYMPHOCYTES NFR BLD AUTO: 6.3 % (ref 19.6–45.3)
M PNEUMO IGG SER IA-ACNC: NOT DETECTED
MCH RBC QN AUTO: 32.2 PG (ref 26.6–33)
MCHC RBC AUTO-ENTMCNC: 34.4 G/DL (ref 31.5–35.7)
MCV RBC AUTO: 93.6 FL (ref 79–97)
METHADONE UR QL SCN: NEGATIVE
MONOCYTES # BLD AUTO: 0.57 10*3/MM3 (ref 0.1–0.9)
MONOCYTES NFR BLD AUTO: 5.7 % (ref 5–12)
NEUTROPHILS NFR BLD AUTO: 8.53 10*3/MM3 (ref 1.7–7)
NEUTROPHILS NFR BLD AUTO: 85.1 % (ref 42.7–76)
NITRITE UR QL STRIP: NEGATIVE
NRBC BLD AUTO-RTO: 0 /100 WBC (ref 0–0.2)
OPIATES UR QL: NEGATIVE
OXYCODONE UR QL SCN: NEGATIVE
PCP UR QL SCN: NEGATIVE
PH UR STRIP.AUTO: 6 [PH] (ref 5–8)
PLATELET # BLD AUTO: 218 10*3/MM3 (ref 140–450)
PMV BLD AUTO: 9 FL (ref 6–12)
POTASSIUM SERPL-SCNC: 4.4 MMOL/L (ref 3.5–5.2)
PROCALCITONIN SERPL-MCNC: 0.18 NG/ML (ref 0–0.25)
PROT SERPL-MCNC: 7.9 G/DL (ref 6–8.5)
PROT UR QL STRIP: NEGATIVE
RBC # BLD AUTO: 5.47 10*6/MM3 (ref 4.14–5.8)
RBC # UR STRIP: ABNORMAL /HPF
REF LAB TEST METHOD: ABNORMAL
RHINOVIRUS RNA SPEC NAA+PROBE: NOT DETECTED
RSV RNA NPH QL NAA+NON-PROBE: NOT DETECTED
SARS-COV-2 RNA NPH QL NAA+NON-PROBE: NOT DETECTED
SODIUM SERPL-SCNC: 138 MMOL/L (ref 136–145)
SP GR UR STRIP: >1.03 (ref 1–1.03)
SQUAMOUS #/AREA URNS HPF: ABNORMAL /HPF
TRICYCLICS UR QL SCN: NEGATIVE
UROBILINOGEN UR QL STRIP: ABNORMAL
WBC # UR STRIP: ABNORMAL /HPF
WBC NRBC COR # BLD AUTO: 10.02 10*3/MM3 (ref 3.4–10.8)
WHOLE BLOOD HOLD COAG: NORMAL

## 2024-10-06 PROCEDURE — 93010 ELECTROCARDIOGRAM REPORT: CPT | Performed by: INTERNAL MEDICINE

## 2024-10-06 PROCEDURE — 25810000003 SODIUM CHLORIDE 0.9 % SOLUTION: Performed by: PHYSICIAN ASSISTANT

## 2024-10-06 PROCEDURE — 0202U NFCT DS 22 TRGT SARS-COV-2: CPT | Performed by: PHYSICIAN ASSISTANT

## 2024-10-06 PROCEDURE — 93005 ELECTROCARDIOGRAM TRACING: CPT

## 2024-10-06 PROCEDURE — 36415 COLL VENOUS BLD VENIPUNCTURE: CPT

## 2024-10-06 PROCEDURE — 71045 X-RAY EXAM CHEST 1 VIEW: CPT

## 2024-10-06 PROCEDURE — 99285 EMERGENCY DEPT VISIT HI MDM: CPT

## 2024-10-06 PROCEDURE — 74177 CT ABD & PELVIS W/CONTRAST: CPT

## 2024-10-06 PROCEDURE — 82077 ASSAY SPEC XCP UR&BREATH IA: CPT | Performed by: PHYSICIAN ASSISTANT

## 2024-10-06 PROCEDURE — 25510000001 IOPAMIDOL 61 % SOLUTION: Performed by: PHYSICIAN ASSISTANT

## 2024-10-06 PROCEDURE — 80053 COMPREHEN METABOLIC PANEL: CPT | Performed by: PHYSICIAN ASSISTANT

## 2024-10-06 PROCEDURE — 87040 BLOOD CULTURE FOR BACTERIA: CPT | Performed by: PHYSICIAN ASSISTANT

## 2024-10-06 PROCEDURE — 81001 URINALYSIS AUTO W/SCOPE: CPT | Performed by: PHYSICIAN ASSISTANT

## 2024-10-06 PROCEDURE — 80307 DRUG TEST PRSMV CHEM ANLYZR: CPT | Performed by: PHYSICIAN ASSISTANT

## 2024-10-06 PROCEDURE — 85025 COMPLETE CBC W/AUTO DIFF WBC: CPT | Performed by: PHYSICIAN ASSISTANT

## 2024-10-06 PROCEDURE — 83605 ASSAY OF LACTIC ACID: CPT | Performed by: PHYSICIAN ASSISTANT

## 2024-10-06 PROCEDURE — 84145 PROCALCITONIN (PCT): CPT | Performed by: PHYSICIAN ASSISTANT

## 2024-10-06 RX ORDER — ACETAMINOPHEN 500 MG
1000 TABLET ORAL ONCE
Status: COMPLETED | OUTPATIENT
Start: 2024-10-06 | End: 2024-10-06

## 2024-10-06 RX ORDER — IOPAMIDOL 612 MG/ML
100 INJECTION, SOLUTION INTRAVASCULAR
Status: COMPLETED | OUTPATIENT
Start: 2024-10-06 | End: 2024-10-06

## 2024-10-06 RX ORDER — SODIUM CHLORIDE 0.9 % (FLUSH) 0.9 %
10 SYRINGE (ML) INJECTION AS NEEDED
Status: DISCONTINUED | OUTPATIENT
Start: 2024-10-06 | End: 2024-10-06 | Stop reason: HOSPADM

## 2024-10-06 RX ADMIN — IOPAMIDOL 100 ML: 612 INJECTION, SOLUTION INTRAVENOUS at 17:37

## 2024-10-06 RX ADMIN — ACETAMINOPHEN 1000 MG: 500 TABLET, FILM COATED ORAL at 16:58

## 2024-10-06 RX ADMIN — SODIUM CHLORIDE 1000 ML: 9 INJECTION, SOLUTION INTRAVENOUS at 16:54

## 2024-10-06 NOTE — Clinical Note
[facility] EMERGENCY DEPARTMENT  [address]  [facility] [address]  Phone: [phone]    Alex Ghotra was seen and treated in our emergency department on 10/6/2024.  He may return to work on 10/09/2024.         Thank you for choosing [facility].    Sridhar Gamez PA-C

## 2024-10-06 NOTE — ED PROVIDER NOTES
Subjective   History of Present Illness    Patient is a 54-year-old male presenting to ED with tachycardia, fever, URI-like symptoms.  PMH significant for AIDS, asthma, COPD, thyroid disease, GERD, hepatitis C chronic.  Fiancé at bedside to provide additional history.  Patient states last night he had some very mild upper abdominal cramping with no other symptoms.  Patient reports he woke up feeling well today and went to an AA meeting reporting that when he got home this afternoon he had sudden onset of myalgias, generalized weakness, thoracic muscular strains followed shortly by nausea as well as diarrhea after he drink a Diet Coke.  Patient states that he continued to have decline feeling as though he was developing a cough and nasal congestion for which he went to urgent care however while there it was noted that his heart rate was in the 140s which they advised he come to the ER for further evaluation.  Patient denies any known specific sick contact but states that he goes to meetings every day and there is almost always someone there who is sick.  Patient did take ibuprofen with his normal morning medications due to his chronic arthritis pains but denies use of any Tylenol since the onset of his symptoms.  Patient did not measure a temperature at home but did feel excessively hot and cold flashes.  Patient states that he follows routinely with Dr. Isidro Phillips for infectious disease with his upcoming appointment in November noting that his counts have all been within normal limits and undetectable and he is compliant with his antivirals.  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    Records reviewed show patient was seen in urgent care earlier today for exposure to COVID-19, tachycardia, fever, immunocompromise, shortness of breath.  No workup was performed patient was advised to go to the ER for further evaluation.    Prior to that patient was  last seen outpatient the PCP office on 9/27/2024 for history tobacco abuse, chronic low back pain, abnormal TSH, urinary hesitancy, chronic fatigue.    Patient last seen in the ED on 7/25/2024 for left upper quadrant abdominal pain.    Review of Systems   Constitutional:  Positive for chills, diaphoresis and fever (Subjective).   HENT:  Positive for congestion. Negative for sore throat.    Eyes: Negative.    Respiratory:  Positive for cough (Nonproductive).    Cardiovascular: Negative.    Gastrointestinal:  Positive for abdominal pain (Upper cramping), diarrhea and nausea. Negative for vomiting.   Genitourinary: Negative.  Negative for dysuria.   Musculoskeletal:  Positive for myalgias (Generalized, worse in thoracic region).   Skin: Negative.    Allergic/Immunologic: Positive for immunocompromised state (Aids).   Neurological:  Positive for weakness (Generalized). Negative for dizziness, light-headedness and headaches.   Psychiatric/Behavioral: Negative.     All other systems reviewed and are negative.      Past Medical History:   Diagnosis Date    AIDS     Asthma     COPD (chronic obstructive pulmonary disease)     Disease of thyroid gland     GERD (gastroesophageal reflux disease)     Hep C w/ coma, chronic     Hyperlipidemia        Allergies   Allergen Reactions    Amoxicillin Other (See Comments)     unknown    Other      SOME HIV MEDICATION, DONT KNOW NAME     Remeron [Mirtazapine] Other (See Comments)     unknown    Ziagen [Abacavir] Other (See Comments)     unknown    Bactrim [Sulfamethoxazole-Trimethoprim] Rash       Past Surgical History:   Procedure Laterality Date    APPENDECTOMY      COLONOSCOPY      2013? ([facility]) polyps    COLONOSCOPY N/A 06/09/2022    Procedure: COLONOSCOPY WITH ANESTHESIA;  Surgeon: Michael Israel MD;  Location: [facility] ENDOSCOPY;  Service: Gastroenterology;  Laterality: N/A;  pre hx colon polyp  post; normal  Alice Anderson MD    ENDOSCOPY      ENDOSCOPY N/A 06/09/2022     Procedure: ESOPHAGOGASTRODUODENOSCOPY WITH ANESTHESIA;  Surgeon: Michael Israel MD;  Location: [facility] ENDOSCOPY;  Service: Gastroenterology;  Laterality: N/A;  pre dysphagia  post candidiasis; stricture dilated  Alice Anderson MD    ENDOSCOPY N/A 03/14/2023    Procedure: ESOPHAGOGASTRODUODENOSCOPY WITH ANESTHESIA;  Surgeon: Michael Israel MD;  Location: [facility] ENDOSCOPY;  Service: Gastroenterology;  Laterality: N/A;  Pre: Epigastric pain, Dysphagia, Abnormal CT of the abdomen  Post: esophagitis   Alice Anderson MD    ENDOSCOPY N/A 8/6/2024    Procedure: ESOPHAGOGASTRODUODENOSCOPY WITH ANESTHESIA;  Surgeon: Michael Israel MD;  Location: [facility] ENDOSCOPY;  Service: Gastroenterology;  Laterality: N/A;  preop; dysphagia  postop r/o candida; r/o barretts; esophageal stricture   PCP Basil Cano    MANDIBLE FRACTURE SURGERY         Family History   Problem Relation Age of Onset    Heart attack Mother     Diabetes Mother     Heart attack Father     Liver cancer Father     Colon cancer Neg Hx     Colon polyps Neg Hx        Social History     Socioeconomic History    Marital status:    Tobacco Use    Smoking status: Every Day     Current packs/day: 0.50     Average packs/day: 0.5 packs/day for 40.0 years (20.0 ttl pk-yrs)     Types: Cigarettes    Smokeless tobacco: Current     Types: Snuff   Vaping Use    Vaping status: Former    Substances: Nicotine    Devices: Disposable    Passive vaping exposure: Yes   Substance and Sexual Activity    Alcohol use: No    Drug use: Not Currently     Types: Methamphetamines, Marijuana, Heroin, LSD, Cocaine(coke)     Comment: Clean for 22 months    Sexual activity: Defer           Objective   Physical Exam  Vitals and nursing note reviewed.   Constitutional:       General: He is not in acute distress.     Appearance: Normal appearance. He is well-developed, well-groomed and normal weight. He is ill-appearing. He is not toxic-appearing or diaphoretic.    HENT:      Head: Normocephalic.      Right Ear: External ear normal.      Left Ear: External ear normal.      Nose: Congestion present. No rhinorrhea.      Right Sinus: No maxillary sinus tenderness or frontal sinus tenderness.      Left Sinus: No maxillary sinus tenderness or frontal sinus tenderness.      Mouth/Throat:      Mouth: Mucous membranes are moist.      Pharynx: Oropharynx is clear. Posterior oropharyngeal erythema present. No oropharyngeal exudate.   Eyes:      General:         Right eye: No discharge.         Left eye: No discharge.      Conjunctiva/sclera: Conjunctivae normal.      Pupils: Pupils are equal, round, and reactive to light.   Neck:      Meningeal: Brudzinski's sign and Kernig's sign absent.   Cardiovascular:      Rate and Rhythm: Regular rhythm. Tachycardia present.      Heart sounds: No murmur heard.  Pulmonary:      Effort: Pulmonary effort is normal. No respiratory distress.      Breath sounds: Normal breath sounds. No stridor. No wheezing, rhonchi or rales.   Chest:      Chest wall: No tenderness.   Abdominal:      General: Bowel sounds are normal. There is no distension.      Palpations: Abdomen is soft.      Tenderness: There is no abdominal tenderness. There is no right CVA tenderness, left CVA tenderness or guarding.   Musculoskeletal:         General: Normal range of motion.      Cervical back: Normal range of motion and neck supple. No rigidity.   Skin:     General: Skin is warm and dry.      Coloration: Skin is not jaundiced.      Findings: No rash.   Neurological:      Mental Status: He is alert and oriented to person, place, and time.   Psychiatric:         Mood and Affect: Mood is anxious.         Behavior: Behavior normal. Behavior is cooperative.         Procedures           ED Course                                             Medical Decision Making  Problems Addressed:  Fever, unspecified fever cause: complicated acute illness or injury  Viral illness: complicated  acute illness or injury    Amount and/or Complexity of Data Reviewed  Independent Historian: spouse     Details: Glendy  External Data Reviewed: labs, radiology and notes.  Labs: ordered. Decision-making details documented in ED Course.  Radiology: ordered. Decision-making details documented in ED Course.  ECG/medicine tests: ordered.  Discussion of management or test interpretation with external provider(s): Dr. Chidi Santos (attending)    Risk  OTC drugs.  Prescription drug management.      Patient is a 54-year-old male presenting to ED with tachycardia, fever, URI-like symptoms.  PMH significant for AIDS, asthma, COPD, thyroid disease, GERD, hepatitis C chronic.  Upon initial evaluation patient resting the bed appearing to be ill however nontoxic and nondiaphoretic.  Patient tachycardic, febrile with otherwise unremarkable vital signs.  Examination finds posterior oropharynx erythematous with no other HEENT abnormalities outside of nasal congestion.  No otitis externa, no reproducible tenderness to frontal or maxillary sinuses.  Intraoral cavity with no rashes, lesions, petechiae.  Lungs are clear to auscultation bilaterally.  Abdomen soft, nontender, nondistended with no CVAT.  No dermatological abnormalities.  Discussed with patient need for workup to include respiratory panel, labs, urinalysis, as well as chest and abdomen imaging for which she is amenable.  Discussed with patient need for dose of Tylenol for which he is also amenable with no further questions, concerns, needs at this time.    Differential diagnosis: Sepsis, systemic infection, urinary tract infection, respiratory virus, COVID-19, rhinovirus, pneumonia, bronchitis, bronchiolitis, colitis, diverticulitis, diverticulosis, hepatitis, pancreatitis, substance use, other    Lab work revealed normal blood cell count, stable H&H, normal platelets and no further CBC abnormalities.  CMP unremarkable to include no electrolyte disturbances, normal renal  and hepatic function.  Normal anion gap of 12.  Low concern for systemic or ischemic process with normal lactic acid and further low concern for systemic bacterial process with normal procalcitonin.  In the setting of febrile brawl status with immunocompromise state blood cultures were sent.  Urinalysis with trace leukocytes, trace ketones and 3-5 WBC however with no bacteria, negative nitrites and 0-2 squamous epithelium a culture will be sent prior to initiating antibiotic therapy.  Respiratory panel unremarkable.  Alcohol negative.  UDS unremarkable including negative fentanyl. Chest x-ray showed: No acute cardiopulmonary abnormality.  CT imaging of the abdomen and pelvis showed: No acute findings in the abdomen or pelvis.  Patient was given Tylenol and a fluid bolus and had resolution of his febrile status as well as improvement in his tachycardia.  Patient states that he was feeling significantly better, tolerating p.o. fluids without difficulty.  Patient states that when he drinks carbonated products such as diet soda he still feels slightly nauseous but with water he has no symptoms.  Discussed with patient need to stay well-hydrated, avoid carbonated beverages, and need for very close outpatient follow-up with his primary care provider or infectious disease within the next 24 to 48 hours.  Discussed appropriate use of Tylenol.  Advised strict return precautions and need for immediate return to ED should he develop any new or worsening symptoms.  Patient is very appreciative, hemodynamically stable questions concerns at this time and is stable for discharge.    Final diagnoses:   Fever, unspecified fever cause   Viral illness       ED Disposition  ED Disposition       ED Disposition   Discharge    Condition   Stable    Comment   --               Basil Santiago MD  [address] [place] [address] Dr Mcginnis  [facility] [address]  [phone]    Schedule an appointment as soon as possible for a visit in 2  days      [facility] EMERGENCY DEPARTMENT  [address] [facility] [address] [phone]    As needed    Alice Anderson MD  [facility] [facility] [address]  [phone]    Schedule an appointment as soon as possible for a visit in 2 days           Medication List      No changes were made to your prescriptions during this visit.            Sridhar Gamez PA-C  10/06/24 2008

## 2024-10-06 NOTE — Clinical Note
[facility] EMERGENCY DEPARTMENT  [address]  [facility] [address]  Phone: [phone]    Alex Ghotra was seen and treated in our emergency department on 10/6/2024.  He may return to work on 10/09/2024.         Thank you for choosing [facility].    Sridhar Gamez PA-C

## 2024-10-07 LAB
QT INTERVAL: 292 MS
QTC INTERVAL: 449 MS

## 2024-10-07 NOTE — DISCHARGE INSTRUCTIONS
Please continue to use Tylenol as needed for your fevers.  It is important you are staying very well-hydrated.  Please follow with your primary care provider and infectious disease provider within the next 48 hours for close outpatient evaluation.  Should you develop any new or worsening symptoms please return to the ER for further evaluation.

## 2024-10-08 ENCOUNTER — HOSPITAL ENCOUNTER (OUTPATIENT)
Dept: CT IMAGING | Age: 54
Discharge: HOME | End: 2024-10-08
Payer: MEDICAID

## 2024-10-08 DIAGNOSIS — R09.81 NASAL CONGESTION: ICD-10-CM

## 2024-10-08 PROCEDURE — 70486 CT MAXILLOFACIAL W/O DYE: CPT

## 2024-10-11 ENCOUNTER — OFFICE VISIT (OUTPATIENT)
Dept: PRIMARY CARE CLINIC | Age: 54
End: 2024-10-11
Payer: MEDICAID

## 2024-10-11 ENCOUNTER — HOSPITAL ENCOUNTER (OUTPATIENT)
Dept: GENERAL RADIOLOGY | Age: 54
Discharge: HOME | End: 2024-10-11
Payer: MEDICAID

## 2024-10-11 VITALS
BODY MASS INDEX: 24.2 KG/M2 | DIASTOLIC BLOOD PRESSURE: 72 MMHG | SYSTOLIC BLOOD PRESSURE: 128 MMHG | HEIGHT: 67 IN | WEIGHT: 154.2 LBS | OXYGEN SATURATION: 90 % | HEART RATE: 64 BPM

## 2024-10-11 DIAGNOSIS — M79.645 CHRONIC PAIN OF LEFT THUMB: Primary | ICD-10-CM

## 2024-10-11 DIAGNOSIS — G89.29 CHRONIC PAIN OF LEFT THUMB: Primary | ICD-10-CM

## 2024-10-11 DIAGNOSIS — G89.29 CHRONIC PAIN OF LEFT THUMB: ICD-10-CM

## 2024-10-11 DIAGNOSIS — M79.645 CHRONIC PAIN OF LEFT THUMB: ICD-10-CM

## 2024-10-11 LAB
BACTERIA SPEC AEROBE CULT: NORMAL
BACTERIA SPEC AEROBE CULT: NORMAL

## 2024-10-11 PROCEDURE — G8427 DOCREV CUR MEDS BY ELIG CLIN: HCPCS | Performed by: FAMILY MEDICINE

## 2024-10-11 PROCEDURE — 99213 OFFICE O/P EST LOW 20 MIN: CPT | Performed by: FAMILY MEDICINE

## 2024-10-11 PROCEDURE — 4004F PT TOBACCO SCREEN RCVD TLK: CPT | Performed by: FAMILY MEDICINE

## 2024-10-11 PROCEDURE — 73120 X-RAY EXAM OF HAND: CPT

## 2024-10-11 PROCEDURE — 3017F COLORECTAL CA SCREEN DOC REV: CPT | Performed by: FAMILY MEDICINE

## 2024-10-11 PROCEDURE — G8482 FLU IMMUNIZE ORDER/ADMIN: HCPCS | Performed by: FAMILY MEDICINE

## 2024-10-11 PROCEDURE — G8420 CALC BMI NORM PARAMETERS: HCPCS | Performed by: FAMILY MEDICINE

## 2024-10-11 NOTE — PROGRESS NOTES
Reason For Visit:   Left Thumb Pain    Combined HPI and A/P:      Diagnosis Orders   1. Chronic pain of left thumb            1.) Left Thumb Pain:     - This is a chronic, worsening problem. He has had worsening of his left thum pain. This hurts when he  things or moves his thumb. I will order an xray of his left thumb. He hs been evaluated by orthopedics. It was recommended that he receive an injection of his thumb. He did not want to get an injection at the time. He is more open to this now. He will call to reschedule with orthopedics.          Return for previously scheduled appointment.    We discussed use, benefit, and side effects of prescribed medications.  All patient questions answered.   Patient agreed with treatment plan.   I reviewed available records in our system and care everywhere. In cases where records are not available, the records have been requested and will be reviewed when available.     Subjective      Past Surgical History:   Procedure Laterality Date    APPENDECTOMY      COLONOSCOPY  12/09/2008    Dr Rodas    MANDIBLE SURGERY Bilateral     had it wired shut    OTHER SURGICAL HISTORY  7/24/13, 1/24/14    cauterization of per-anal condyloma     Family History   Problem Relation Age of Onset    Diabetes Mother     Heart Disease Father     Cancer Father         liver    Lung Cancer Other         uncles on both sides     Social History     Tobacco Use    Smoking status: Every Day     Current packs/day: 1.50     Average packs/day: 1.5 packs/day for 34.0 years (51.0 ttl pk-yrs)     Types: Cigarettes    Smokeless tobacco: Never   Substance Use Topics    Alcohol use: No      Current Outpatient Medications   Medication Sig Dispense Refill    ibuprofen (ADVIL;MOTRIN) 800 MG tablet Take 1 tablet by mouth 2 times daily as needed for Pain 180 tablet 3    rosuvastatin (CRESTOR) 5 MG tablet Take 1 tablet by mouth daily      albuterol sulfate HFA (VENTOLIN HFA) 108 (90 Base) MCG/ACT inhaler

## 2024-10-14 ENCOUNTER — OFFICE VISIT (OUTPATIENT)
Dept: CARDIOLOGY | Facility: CLINIC | Age: 54
End: 2024-10-14
Payer: COMMERCIAL

## 2024-10-14 VITALS
HEIGHT: 67 IN | DIASTOLIC BLOOD PRESSURE: 70 MMHG | BODY MASS INDEX: 23.23 KG/M2 | HEART RATE: 67 BPM | OXYGEN SATURATION: 98 % | WEIGHT: 148 LBS | SYSTOLIC BLOOD PRESSURE: 108 MMHG

## 2024-10-14 DIAGNOSIS — R07.89 CHEST PAIN, ATYPICAL: Primary | ICD-10-CM

## 2024-10-14 DIAGNOSIS — F17.200 SMOKER: ICD-10-CM

## 2024-10-14 PROCEDURE — 99213 OFFICE O/P EST LOW 20 MIN: CPT | Performed by: NURSE PRACTITIONER

## 2024-10-14 PROCEDURE — 1160F RVW MEDS BY RX/DR IN RCRD: CPT | Performed by: NURSE PRACTITIONER

## 2024-10-14 PROCEDURE — 1159F MED LIST DOCD IN RCRD: CPT | Performed by: NURSE PRACTITIONER

## 2024-10-14 PROCEDURE — 93000 ELECTROCARDIOGRAM COMPLETE: CPT | Performed by: NURSE PRACTITIONER

## 2024-10-14 NOTE — PROGRESS NOTES
Chief Complaint  Chest Pain (1yr F/U)    Subjective          Alex Ghotra presents to [facility] CARDIOLOGY for routine follow up. He has had hepatitis in the past and has been treated. He has HIV that is currently asymptomatic. He has hyperlipidemia. He continues to report intermittent chest pain. The patient denies shortness of breath, palpitations, dizziness, syncope, orthopnea, PND, swelling or decreased stamina. The patient denies any signs of bleeding.     Chest Pain   This is a chronic problem. The current episode started more than 1 year ago. The onset quality is sudden. The problem occurs intermittently. The problem has been waxing and waning. The pain is present in the epigastric region and substernal region. The pain is moderate. The quality of the pain is described as sharp and stabbing. The pain radiates to the mid back. Pertinent negatives include no abdominal pain, back pain, claudication, cough, diaphoresis, dizziness, exertional chest pressure, fever, headaches, hemoptysis, irregular heartbeat, leg pain, lower extremity edema, malaise/fatigue, nausea, near-syncope, numbness, orthopnea, palpitations, PND, shortness of breath, sputum production, syncope, vomiting or weakness. Associated with: sneezing. He has tried nothing for the symptoms. Risk factors include male gender and substance abuse.   His past medical history is significant for hyperlipidemia. Prior diagnostic workup includes stress echo and echocardiogram.   Hyperlipidemia  This is a chronic problem. The current episode started more than 1 month ago. Pertinent negatives include no chest pain, leg pain or shortness of breath. Current antihyperlipidemic treatment includes statins. Risk factors for coronary artery disease include male sex and dyslipidemia.     I have reviewed and confirmed the accuracy of the ROS  ARACELI Chand        Objective     Current Outpatient Medications:     albuterol sulfate HFA  "108 (90 Base) MCG/ACT inhaler, Inhale 2 puffs Every 4 (Four) Hours As Needed for Wheezing or Shortness of Air (Rinse Mouth after use)., Disp: 6.7 g, Rfl: 0    buPROPion XL (WELLBUTRIN XL) 300 MG 24 hr tablet, Take 1 tablet by mouth Every Morning., Disp: , Rfl:     Cholecalciferol (Vitamin D3) 50 MCG (2000 UT) capsule, Take 1 capsule by mouth Daily., Disp: , Rfl:     esomeprazole (nexIUM) 20 MG capsule, Take 1 capsule by mouth Every Morning Before Breakfast., Disp: , Rfl:     ibuprofen (ADVIL,MOTRIN) 800 MG tablet, Take 1 tablet by mouth As Needed., Disp: , Rfl:     Juluca 50-25 MG per tablet, Take 1 tablet by mouth daily; Stop Edurant, Prezcobix, Tivicay, Descovy, Disp: , Rfl:     Prezcobix 800-150 MG per tablet, Take 1 tablet by mouth Daily., Disp: , Rfl:     rosuvastatin (CRESTOR) 5 MG tablet, Take 1 tablet by mouth Daily., Disp: , Rfl:     Triumeq 600- MG per tablet, Take 1 tablet by mouth daily; Take 2 hours before or 6 hours after Calcium, Antacids or Vitamins, Disp: , Rfl:   Vital Signs:   /70   Pulse 67   Ht 170.2 cm (67\")   Wt 67.1 kg (148 lb)   SpO2 98%   BMI 23.18 kg/m²     Vitals and nursing note reviewed.   Constitutional:       General: Awake.      Appearance: Normal and healthy appearance. Well-developed, normal weight and not in distress.   Eyes:      General: Lids are normal.      Conjunctiva/sclera: Conjunctivae normal.      Pupils: Pupils are equal, round, and reactive to light.   HENT:      Head: Normocephalic and atraumatic.      Nose: Nose normal.   Neck:      Vascular: No JVR. JVD normal.   Pulmonary:      Effort: Pulmonary effort is normal.      Breath sounds: Examination of the right-middle field reveals wheezing. Examination of the left-middle field reveals wheezing. Examination of the right-lower field reveals wheezing. Examination of the left-lower field reveals wheezing. No decreased breath sounds. Wheezing present. No rhonchi. No rales.   Chest:      Chest wall: Not " tender to palpatation.   Cardiovascular:      PMI at left midclavicular line. Normal rate. Regular rhythm. Normal S1. Normal S2.       Murmurs: There is no murmur.      No gallop.  No click. No rub.   Pulses:     Intact distal pulses.   Edema:     Peripheral edema absent.   Abdominal:      General: Bowel sounds are normal.      Palpations: Abdomen is soft.      Tenderness: There is no abdominal tenderness.   Musculoskeletal: Normal range of motion.         General: No tenderness.      Cervical back: Normal range of motion. Skin:     General: Skin is warm and dry.   Neurological:      General: No focal deficit present.      Mental Status: Alert, oriented to person, place, and time and oriented to person, place and time.   Psychiatric:         Attention and Perception: Attention and perception normal.         Mood and Affect: Mood and affect normal.         Speech: Speech normal.         Behavior: Behavior normal. Behavior is cooperative.         Thought Content: Thought content normal.         Cognition and Memory: Cognition and memory normal.         Judgment: Judgment normal.        Result Review :   The following data was reviewed by: ARACELI Chand on 10/14/2024:  Common labs          7/25/2024    21:28 9/27/2024    11:11 10/6/2024    16:35   Common Labs   Glucose 72   109    BUN 22   20    Creatinine 1.09   1.10    Sodium 140   138    Potassium 4.2   4.4    Chloride 103   104    Calcium 10.0   9.1    Albumin 4.7   4.4    Total Bilirubin 0.2   0.4    Alkaline Phosphatase 70   73    AST (SGOT) 33   19    ALT (SGPT) 26   21    WBC 7.57   10.02    Hemoglobin 15.8   17.6    Hematocrit 47.1   51.2    Platelets 244   218    PSA  0.37           Details          This result is from an external source.             Data reviewed: Cardiology studies stress echo 5/24/22, 2d echo 6/29/22 and CT angiogram of the coronary arteries 10/3/23      ECG 12 Lead    Date/Time: 10/14/2024 2:48 PM  Performed by: Liz Little  VELIA, APRN    Authorized by: Liz Little, APRN  Comparison: compared with previous ECG from 10/6/2024  Similar to previous ECG  Rhythm: sinus rhythm  Rate: normal  BPM: 67  QRS axis: normal    Clinical impression: normal ECG            Assessment and Plan    Diagnoses and all orders for this visit:    1. Chest pain, atypical (Primary)- normal stress echo 5/24/22 and normal CT angiogram of the coronary arteries 10/3/23.     2. Smoker- Alex Ghotra  reports that he has been smoking cigarettes. He has a 20 pack-year smoking history. He has been exposed to tobacco smoke. His smokeless tobacco use includes snuff.. I have educated him on the risk of diseases from using tobacco products such as cancer, COPD, and heart disease. I advised him to quit and he is not willing to quit. I spent 3  minutes counseling the patient.      Follow Up   Return in about 1 year (around 10/14/2025) for Next scheduled follow up.  Patient was given instructions and counseling regarding his condition or for health maintenance advice. Please see specific information pulled into the AVS if appropriate.        152.4

## 2024-10-16 ENCOUNTER — OFFICE VISIT (OUTPATIENT)
Dept: ENT CLINIC | Age: 54
End: 2024-10-16
Payer: MEDICAID

## 2024-10-16 VITALS
SYSTOLIC BLOOD PRESSURE: 118 MMHG | WEIGHT: 154 LBS | HEIGHT: 67 IN | BODY MASS INDEX: 24.17 KG/M2 | DIASTOLIC BLOOD PRESSURE: 68 MMHG

## 2024-10-16 DIAGNOSIS — R09.82 POSTNASAL DRIP: ICD-10-CM

## 2024-10-16 DIAGNOSIS — H93.8X2 SENSATION OF FULLNESS IN EAR, LEFT: ICD-10-CM

## 2024-10-16 DIAGNOSIS — H73.892 ERYTHEMA OF TYMPANIC MEMBRANE OF LEFT EAR: ICD-10-CM

## 2024-10-16 DIAGNOSIS — H91.92 HEARING LOSS OF LEFT EAR, UNSPECIFIED HEARING LOSS TYPE: ICD-10-CM

## 2024-10-16 DIAGNOSIS — H93.12 TINNITUS, LEFT: Primary | ICD-10-CM

## 2024-10-16 DIAGNOSIS — R09.81 NASAL CONGESTION: ICD-10-CM

## 2024-10-16 PROCEDURE — 99213 OFFICE O/P EST LOW 20 MIN: CPT | Performed by: NURSE PRACTITIONER

## 2024-10-16 RX ORDER — IPRATROPIUM BROMIDE 21 UG/1
2 SPRAY, METERED NASAL EVERY 12 HOURS
Qty: 30 ML | Refills: 5 | Status: SHIPPED | OUTPATIENT
Start: 2024-10-16

## 2024-10-16 ASSESSMENT — ENCOUNTER SYMPTOMS
RESPIRATORY NEGATIVE: 1
ALLERGIC/IMMUNOLOGIC NEGATIVE: 1
EYES NEGATIVE: 1
GASTROINTESTINAL NEGATIVE: 1

## 2024-10-16 NOTE — PROGRESS NOTES
10/16/2024    Preston Hernandez (:  1970) is a 54 y.o. male, Established patient, here for evaluation of the following chief complaint(s):  Follow-up (LT EAR)      Vitals:    10/16/24 1129   BP: 118/68   Weight: 69.9 kg (154 lb)   Height: 1.702 m (5' 7.01\")       Wt Readings from Last 3 Encounters:   10/16/24 69.9 kg (154 lb)   10/11/24 69.9 kg (154 lb 3.2 oz)   24 69.9 kg (154 lb 3.2 oz)       BP Readings from Last 3 Encounters:   10/16/24 118/68   10/11/24 128/72   24 118/72         SUBJECTIVE/OBJECTIVE:    Patient seen today for follow up of his left ear and nose. He had a CT of his sinuses on 10/08/24 that has not been read by radiology yet. He reports the ipratropium nasal spray helped with the drainage down his throat, but he ran out. He did not have his hearing test done due to financial constraints. He does still complain of nasal congestion on the right side. He does report a history of nasal trauma and nasal fracture. He was put on ofloxacin drops at his last visit. He denies ear popping or ear pain. He does complain of left sided intermittent aural fullness, tinnitus, and drainage. He reports long standing decreased hearing in his left ear and states this is his baseline. He reports the occasional drainage from his left ear is his baseline as well.        Review of Systems   Constitutional: Negative.    HENT:  Positive for congestion, ear discharge (occasional, left), ear pain (intermittent fullness on left), hearing loss (left), postnasal drip and tinnitus (intermittent, left).    Eyes: Negative.    Respiratory: Negative.     Cardiovascular: Negative.    Gastrointestinal: Negative.    Endocrine: Negative.    Musculoskeletal: Negative.    Skin: Negative.    Allergic/Immunologic: Negative.    Neurological: Negative.    Hematological: Negative.    Psychiatric/Behavioral: Negative.          Physical Exam  Vitals reviewed.   Constitutional:       Appearance: Normal appearance. He is

## 2024-10-21 ENCOUNTER — TELEPHONE (OUTPATIENT)
Dept: PRIMARY CARE CLINIC | Age: 54
End: 2024-10-21

## 2024-10-21 ASSESSMENT — ENCOUNTER SYMPTOMS
TROUBLE SWALLOWING: 0
CONSTIPATION: 0
VOMITING: 0
COUGH: 0
NAUSEA: 0
ABDOMINAL PAIN: 0
SHORTNESS OF BREATH: 0
BACK PAIN: 1
DIARRHEA: 0

## 2024-10-21 NOTE — TELEPHONE ENCOUNTER
PATIENT CAME IN WANTING TO KNOW WHEN A ORDER WILL BE PUT IN FOR A MRI ON HIS LT HAND. PATIENT STATED HE CANNOT USE HIS LT HAND.

## 2024-10-25 ENCOUNTER — HOSPITAL ENCOUNTER (OUTPATIENT)
Dept: CT IMAGING | Age: 54
Discharge: HOME | End: 2024-10-25
Payer: MEDICAID

## 2024-10-25 DIAGNOSIS — Z87.891 PERSONAL HISTORY OF TOBACCO USE: ICD-10-CM

## 2024-10-25 PROCEDURE — 71271 CT THORAX LUNG CANCER SCR C-: CPT

## 2024-10-26 ASSESSMENT — ENCOUNTER SYMPTOMS
DIARRHEA: 0
COUGH: 0
ABDOMINAL PAIN: 0
CONSTIPATION: 0
TROUBLE SWALLOWING: 0
SHORTNESS OF BREATH: 0
NAUSEA: 0
VOMITING: 0

## 2024-11-13 ENCOUNTER — OFFICE VISIT (OUTPATIENT)
Dept: ENT CLINIC | Age: 54
End: 2024-11-13
Payer: MEDICAID

## 2024-11-13 VITALS
WEIGHT: 155 LBS | BODY MASS INDEX: 24.33 KG/M2 | DIASTOLIC BLOOD PRESSURE: 74 MMHG | SYSTOLIC BLOOD PRESSURE: 120 MMHG | HEIGHT: 67 IN

## 2024-11-13 DIAGNOSIS — H73.892 ERYTHEMA OF TYMPANIC MEMBRANE OF LEFT EAR: ICD-10-CM

## 2024-11-13 DIAGNOSIS — J02.9 SORE THROAT: ICD-10-CM

## 2024-11-13 DIAGNOSIS — H93.12 TINNITUS, LEFT: Primary | ICD-10-CM

## 2024-11-13 DIAGNOSIS — J34.2 DEVIATED NASAL SEPTUM: ICD-10-CM

## 2024-11-13 DIAGNOSIS — R09.81 NASAL CONGESTION: ICD-10-CM

## 2024-11-13 PROCEDURE — 4004F PT TOBACCO SCREEN RCVD TLK: CPT | Performed by: NURSE PRACTITIONER

## 2024-11-13 PROCEDURE — 3017F COLORECTAL CA SCREEN DOC REV: CPT | Performed by: NURSE PRACTITIONER

## 2024-11-13 PROCEDURE — G8427 DOCREV CUR MEDS BY ELIG CLIN: HCPCS | Performed by: NURSE PRACTITIONER

## 2024-11-13 PROCEDURE — G8482 FLU IMMUNIZE ORDER/ADMIN: HCPCS | Performed by: NURSE PRACTITIONER

## 2024-11-13 PROCEDURE — G8420 CALC BMI NORM PARAMETERS: HCPCS | Performed by: NURSE PRACTITIONER

## 2024-11-13 PROCEDURE — 99213 OFFICE O/P EST LOW 20 MIN: CPT | Performed by: NURSE PRACTITIONER

## 2024-11-13 RX ORDER — CLINDAMYCIN HYDROCHLORIDE 300 MG/1
300 CAPSULE ORAL 3 TIMES DAILY
Qty: 30 CAPSULE | Refills: 0 | Status: SHIPPED | OUTPATIENT
Start: 2024-11-13 | End: 2024-11-23

## 2024-11-13 ASSESSMENT — ENCOUNTER SYMPTOMS
TROUBLE SWALLOWING: 0
ALLERGIC/IMMUNOLOGIC NEGATIVE: 1
SORE THROAT: 1
GASTROINTESTINAL NEGATIVE: 1
COUGH: 1
EYES NEGATIVE: 1

## 2024-11-13 NOTE — PROGRESS NOTES
2024    Preston Hernandez (:  1970) is a 54 y.o. male, Established patient, here for evaluation of the following chief complaint(s):  Follow-up (EARS)      Vitals:    24 1039   BP: 120/74   Weight: 70.3 kg (155 lb)   Height: 1.702 m (5' 7.01\")       Wt Readings from Last 3 Encounters:   24 70.3 kg (155 lb)   10/16/24 69.9 kg (154 lb)   10/11/24 69.9 kg (154 lb 3.2 oz)       BP Readings from Last 3 Encounters:   24 120/74   10/16/24 118/68   10/11/24 128/72         SUBJECTIVE/OBJECTIVE:    Patient seen today for follow up of his left ear and nose. He states that he had his hearing test done, but does not have a copy of this with him. He denies fullness in his left ear. He reports intermittent tinnitus in his left ear and feels it has decreased. He denies ear drainage or pain. He states that he still can not breathe through the right side of his nose. CT of sinuses showed rightward septal deviation and spur. He states the nasal spray is controlling the drainage down his throat. He does complain of a tickle in his throat, sore throat, and coughing up white stuff for the last couple of days. He is requesting an antibiotic. He denies difficulty swallowing. He denies fever.        Review of Systems   Constitutional: Negative.    HENT:  Positive for congestion (right sided), sore throat and tinnitus (intermittent, left). Negative for trouble swallowing.         Tickle in throat   Eyes: Negative.    Respiratory:  Positive for cough.    Cardiovascular: Negative.    Gastrointestinal: Negative.    Endocrine: Negative.    Musculoskeletal: Negative.    Skin: Negative.    Allergic/Immunologic: Negative.    Neurological: Negative.    Hematological: Negative.    Psychiatric/Behavioral: Negative.          Physical Exam  Vitals reviewed.   Constitutional:       Appearance: Normal appearance. He is normal weight.   HENT:      Head: Normocephalic and atraumatic.      Right Ear: Tympanic membrane,

## 2024-11-18 ENCOUNTER — OFFICE VISIT (OUTPATIENT)
Age: 54
End: 2024-11-18
Payer: MEDICAID

## 2024-11-18 VITALS — HEIGHT: 67 IN | WEIGHT: 155 LBS | BODY MASS INDEX: 24.33 KG/M2

## 2024-11-18 DIAGNOSIS — M18.12 LOCALIZED PRIMARY OSTEOARTHRITIS OF CARPOMETACARPAL JOINT OF LEFT THUMB: Primary | ICD-10-CM

## 2024-11-18 PROCEDURE — 99214 OFFICE O/P EST MOD 30 MIN: CPT | Performed by: ORTHOPAEDIC SURGERY

## 2024-11-18 PROCEDURE — 4004F PT TOBACCO SCREEN RCVD TLK: CPT | Performed by: ORTHOPAEDIC SURGERY

## 2024-11-18 PROCEDURE — G8420 CALC BMI NORM PARAMETERS: HCPCS | Performed by: ORTHOPAEDIC SURGERY

## 2024-11-18 PROCEDURE — 3017F COLORECTAL CA SCREEN DOC REV: CPT | Performed by: ORTHOPAEDIC SURGERY

## 2024-11-18 PROCEDURE — G8427 DOCREV CUR MEDS BY ELIG CLIN: HCPCS | Performed by: ORTHOPAEDIC SURGERY

## 2024-11-18 PROCEDURE — G8482 FLU IMMUNIZE ORDER/ADMIN: HCPCS | Performed by: ORTHOPAEDIC SURGERY

## 2024-11-18 NOTE — PROGRESS NOTES
Hpi Title: Evaluation of Skin Lesions Other         uncles on both sides        Medications  Current Outpatient Medications   Medication Sig Dispense Refill    clindamycin (CLEOCIN) 300 MG capsule Take 1 capsule by mouth 3 times daily for 10 days 30 capsule 0    ipratropium (ATROVENT) 0.03 % nasal spray 2 sprays by Each Nostril route in the morning and 2 sprays in the evening. 30 mL 5    ibuprofen (ADVIL;MOTRIN) 800 MG tablet Take 1 tablet by mouth 2 times daily as needed for Pain 180 tablet 3    rosuvastatin (CRESTOR) 5 MG tablet Take 1 tablet by mouth daily      albuterol sulfate HFA (VENTOLIN HFA) 108 (90 Base) MCG/ACT inhaler Inhale 2 puffs into the lungs 4 times daily as needed for Wheezing 54 g 1    mupirocin (BACTROBAN) 2 % ointment Apply topically 3 times daily. 15 g 0    triamcinolone (KENALOG) 0.1 % cream Apply topically 2 times daily. 15 g 0    pramipexole (MIRAPEX) 0.125 MG tablet Take 1 tablet by mouth nightly      prazosin (MINIPRESS) 1 MG capsule Take 1 capsule by mouth nightly      buPROPion (WELLBUTRIN XL) 300 MG extended release tablet Take 1 tablet by mouth every morning      esomeprazole (NEXIUM) 20 MG delayed release capsule Take 1 capsule by mouth every morning (before breakfast) 90 capsule 3    TRIUMEQ 600- MG TABS       PREZCOBIX 800-150 MG TABS tablet Take 1 tablet by mouth daily      MUCINEX 600 MG extended release tablet Take 1 tablet by mouth in the morning and 1 tablet in the evening.      tadalafil (CIALIS) 20 MG tablet Take 1 tablet by mouth daily as needed for Erectile Dysfunction 30 tablet 1    azelastine (ASTELIN) 0.1 % nasal spray 1 spray by Nasal route 2 times daily Use in each nostril as directed 30 mL 3    D3 HIGH POTENCY 50 MCG (2000 UT) CAPS       LIDODERM 5 % Apply 1 patch on the skin daily as needed for pain      Multiple Vitamin (DAILY-ROSELINE) TABS Take 1 tablet by mouth daily Opposite time of day as Tivicay      albuterol-ipratropium (COMBIVENT)  MCG/ACT inhaler Inhale 2 puffs into the lungs every 6

## 2024-12-04 DIAGNOSIS — K21.9 GASTROESOPHAGEAL REFLUX DISEASE WITHOUT ESOPHAGITIS: ICD-10-CM

## 2024-12-09 ENCOUNTER — OFFICE VISIT (OUTPATIENT)
Age: 54
End: 2024-12-09
Payer: MEDICAID

## 2024-12-09 VITALS
HEART RATE: 96 BPM | RESPIRATION RATE: 18 BRPM | WEIGHT: 150 LBS | TEMPERATURE: 97.8 F | DIASTOLIC BLOOD PRESSURE: 60 MMHG | SYSTOLIC BLOOD PRESSURE: 108 MMHG | BODY MASS INDEX: 23.49 KG/M2 | OXYGEN SATURATION: 97 %

## 2024-12-09 DIAGNOSIS — H10.31 ACUTE BACTERIAL CONJUNCTIVITIS OF RIGHT EYE: Primary | ICD-10-CM

## 2024-12-09 PROCEDURE — 99213 OFFICE O/P EST LOW 20 MIN: CPT

## 2024-12-09 RX ORDER — TOBRAMYCIN 3 MG/ML
1 SOLUTION/ DROPS OPHTHALMIC EVERY 4 HOURS
Qty: 1 EACH | Refills: 0 | Status: SHIPPED | OUTPATIENT
Start: 2024-12-09 | End: 2024-12-19

## 2024-12-09 ASSESSMENT — ENCOUNTER SYMPTOMS
RHINORRHEA: 0
COUGH: 0
WHEEZING: 0
EYE PAIN: 0
ABDOMINAL DISTENTION: 0
EYE ITCHING: 1
SHORTNESS OF BREATH: 0
DIARRHEA: 0
CONSTIPATION: 0
EYE REDNESS: 1
SINUS PRESSURE: 0
ABDOMINAL PAIN: 0
TROUBLE SWALLOWING: 0
NAUSEA: 0
SINUS PAIN: 0
CHEST TIGHTNESS: 0
VOMITING: 0
COLOR CHANGE: 0
APNEA: 0
EYE DISCHARGE: 0
SORE THROAT: 0

## 2024-12-09 NOTE — PROGRESS NOTES
no distension.      Palpations: Abdomen is soft.      Tenderness: There is no abdominal tenderness. There is no guarding.   Musculoskeletal:         General: Normal range of motion.      Cervical back: Full passive range of motion without pain, normal range of motion and neck supple. No rigidity.      Right lower leg: No edema.      Left lower leg: No edema.   Lymphadenopathy:      Cervical: No cervical adenopathy.   Skin:     General: Skin is warm and dry.      Coloration: Skin is not cyanotic or pale.      Findings: No rash.   Neurological:      General: No focal deficit present.      Mental Status: He is alert and oriented to person, place, and time.      Sensory: Sensation is intact.      Motor: Motor function is intact.      Coordination: Coordination is intact.      Gait: Gait is intact.   Psychiatric:         Attention and Perception: Attention normal.         Mood and Affect: Mood and affect normal.         Speech: Speech normal.         Behavior: Behavior normal. Behavior is cooperative.         /60   Pulse 96   Temp 97.8 °F (36.6 °C) (Temporal)   Resp 18   Wt 68 kg (150 lb)   SpO2 97%   BMI 23.49 kg/m²     Assessment   Assessment & Plan      Diagnosis Orders   1. Acute bacterial conjunctivitis of right eye  tobramycin (TOBREX) 0.3 % ophthalmic solution          Plan     Prescription for Tobrex given, instill drops into affected eye 3 times a day.   May use warm compress. Avoid touching the eye and wash hands frequently.   If applicable, discontinue use of contacts.  May return to work/school 24 hours after treatment started.   May take OTC medications.   Monitor for severe eye pain, change in vision, or floaters. Go to the ER for any of those symptoms.   The patient is to follow up with PCP or return to clinic if symptoms worsen/fail to improve.    No orders of the defined types were placed in this encounter.      No results found for this visit on 12/09/24.    Orders Placed This Encounter 
Oriented - self; Oriented - place; Oriented - time

## 2024-12-09 NOTE — PATIENT INSTRUCTIONS
Prescription for Tobrex given, instill drops into affected eye 3 times a day.   May use warm compress. Avoid touching the eye and wash hands frequently.   If applicable, discontinue use of contacts.  May return to work/school 24 hours after treatment started.   May take OTC medications.   Monitor for severe eye pain, change in vision, or floaters. Go to the ER for any of those symptoms.   The patient is to follow up with PCP or return to clinic if symptoms worsen/fail to improve.

## 2024-12-11 ENCOUNTER — TELEPHONE (OUTPATIENT)
Dept: ENT CLINIC | Age: 54
End: 2024-12-11

## 2024-12-11 DIAGNOSIS — H91.8X3 ASYMMETRICAL HEARING LOSS: Primary | ICD-10-CM

## 2024-12-11 NOTE — TELEPHONE ENCOUNTER
Called patient and reviewed hearing test from [facility]. Hearing test showed asymmetrical hearing loss. Recommended MRI for further evaluation and to rule out IAC mass. He voiced understanding and is agreeable to this.

## 2024-12-16 ENCOUNTER — OFFICE VISIT (OUTPATIENT)
Dept: ENT CLINIC | Age: 54
End: 2024-12-16
Payer: MEDICAID

## 2024-12-16 VITALS
WEIGHT: 152 LBS | HEIGHT: 67 IN | DIASTOLIC BLOOD PRESSURE: 64 MMHG | BODY MASS INDEX: 23.86 KG/M2 | SYSTOLIC BLOOD PRESSURE: 112 MMHG

## 2024-12-16 DIAGNOSIS — J34.2 DEVIATED NASAL SEPTUM: ICD-10-CM

## 2024-12-16 DIAGNOSIS — J34.3 HYPERTROPHY OF INFERIOR NASAL TURBINATE: ICD-10-CM

## 2024-12-16 DIAGNOSIS — H91.8X3 ASYMMETRICAL HEARING LOSS: Primary | ICD-10-CM

## 2024-12-16 PROCEDURE — 99213 OFFICE O/P EST LOW 20 MIN: CPT | Performed by: OTOLARYNGOLOGY

## 2024-12-16 PROCEDURE — G8427 DOCREV CUR MEDS BY ELIG CLIN: HCPCS | Performed by: OTOLARYNGOLOGY

## 2024-12-16 PROCEDURE — 3017F COLORECTAL CA SCREEN DOC REV: CPT | Performed by: OTOLARYNGOLOGY

## 2024-12-16 PROCEDURE — G8420 CALC BMI NORM PARAMETERS: HCPCS | Performed by: OTOLARYNGOLOGY

## 2024-12-16 PROCEDURE — G8482 FLU IMMUNIZE ORDER/ADMIN: HCPCS | Performed by: OTOLARYNGOLOGY

## 2024-12-16 PROCEDURE — 4004F PT TOBACCO SCREEN RCVD TLK: CPT | Performed by: OTOLARYNGOLOGY

## 2024-12-16 ASSESSMENT — ENCOUNTER SYMPTOMS
EYES NEGATIVE: 1
GASTROINTESTINAL NEGATIVE: 1
ALLERGIC/IMMUNOLOGIC NEGATIVE: 1
RESPIRATORY NEGATIVE: 1

## 2024-12-16 NOTE — PROGRESS NOTES
2024    Preston Hernandez (:  1970) is a 54 y.o. male, Established patient, here for evaluation of the following chief complaint(s):  New Patient (Left ear, nasal congestion)      Vitals:    24 1427   BP: 112/64   Weight: 68.9 kg (152 lb)   Height: 1.702 m (5' 7\")       Wt Readings from Last 3 Encounters:   24 68.9 kg (152 lb)   24 68 kg (150 lb)   24 70.3 kg (155 lb)       BP Readings from Last 3 Encounters:   24 112/64   24 108/60   24 120/74         SUBJECTIVE/OBJECTIVE:    Patient seen today for his nose.  He suffers from obstruction on the right for quite some time.  He said he broke his nose as a child.  CT scan does demonstrate significant deviation of right septum.  He also complains of hearing loss and MRI has been ordered for asymmetrical hearing loss.        Review of Systems   Constitutional: Negative.    HENT:  Positive for congestion and hearing loss.    Eyes: Negative.    Respiratory: Negative.     Cardiovascular: Negative.    Gastrointestinal: Negative.    Endocrine: Negative.    Musculoskeletal: Negative.    Skin: Negative.    Allergic/Immunologic: Negative.    Neurological: Negative.    Hematological: Negative.    Psychiatric/Behavioral: Negative.          Physical Exam  Vitals reviewed.   Constitutional:       Appearance: Normal appearance. He is normal weight.   HENT:      Head: Normocephalic and atraumatic.      Right Ear: Tympanic membrane, ear canal and external ear normal.      Left Ear: Tympanic membrane, ear canal and external ear normal.      Nose: Septal deviation present.      Right Turbinates: Enlarged.      Left Turbinates: Enlarged.      Mouth/Throat:      Mouth: Mucous membranes are moist.      Pharynx: Oropharynx is clear.   Eyes:      Extraocular Movements: Extraocular movements intact.      Pupils: Pupils are equal, round, and reactive to light.   Cardiovascular:      Rate and Rhythm: Normal rate and regular rhythm.

## 2024-12-17 ENCOUNTER — OFFICE VISIT (OUTPATIENT)
Dept: PRIMARY CARE CLINIC | Age: 54
End: 2024-12-17
Payer: MEDICAID

## 2024-12-17 VITALS
HEIGHT: 67 IN | DIASTOLIC BLOOD PRESSURE: 70 MMHG | HEART RATE: 90 BPM | OXYGEN SATURATION: 97 % | WEIGHT: 154 LBS | BODY MASS INDEX: 24.17 KG/M2 | SYSTOLIC BLOOD PRESSURE: 118 MMHG

## 2024-12-17 DIAGNOSIS — F43.10 PTSD (POST-TRAUMATIC STRESS DISORDER): ICD-10-CM

## 2024-12-17 DIAGNOSIS — K21.9 GASTROESOPHAGEAL REFLUX DISEASE WITHOUT ESOPHAGITIS: Primary | ICD-10-CM

## 2024-12-17 DIAGNOSIS — N40.1 BENIGN PROSTATIC HYPERPLASIA WITH URINARY HESITANCY: ICD-10-CM

## 2024-12-17 DIAGNOSIS — R39.11 BENIGN PROSTATIC HYPERPLASIA WITH URINARY HESITANCY: ICD-10-CM

## 2024-12-17 PROCEDURE — 3017F COLORECTAL CA SCREEN DOC REV: CPT | Performed by: FAMILY MEDICINE

## 2024-12-17 PROCEDURE — G8420 CALC BMI NORM PARAMETERS: HCPCS | Performed by: FAMILY MEDICINE

## 2024-12-17 PROCEDURE — G8482 FLU IMMUNIZE ORDER/ADMIN: HCPCS | Performed by: FAMILY MEDICINE

## 2024-12-17 PROCEDURE — 4004F PT TOBACCO SCREEN RCVD TLK: CPT | Performed by: FAMILY MEDICINE

## 2024-12-17 PROCEDURE — G8427 DOCREV CUR MEDS BY ELIG CLIN: HCPCS | Performed by: FAMILY MEDICINE

## 2024-12-17 PROCEDURE — 99213 OFFICE O/P EST LOW 20 MIN: CPT | Performed by: FAMILY MEDICINE

## 2024-12-17 RX ORDER — ATOMOXETINE 25 MG/1
25 CAPSULE ORAL EVERY MORNING
COMMUNITY

## 2024-12-17 RX ORDER — NYSTATIN 100000 [USP'U]/ML
500000 SUSPENSION ORAL 4 TIMES DAILY
COMMUNITY
Start: 2024-12-12 | End: 2024-12-17 | Stop reason: ALTCHOICE

## 2024-12-17 RX ORDER — PRAZOSIN HYDROCHLORIDE 1 MG/1
1 CAPSULE ORAL NIGHTLY
Qty: 30 CAPSULE | Refills: 3 | Status: SHIPPED | OUTPATIENT
Start: 2024-12-17

## 2024-12-17 RX ORDER — ATOMOXETINE 25 MG/1
25 CAPSULE ORAL DAILY
COMMUNITY
Start: 2024-12-16

## 2024-12-17 RX ORDER — TAMSULOSIN HYDROCHLORIDE 0.4 MG/1
0.4 CAPSULE ORAL DAILY
Qty: 30 CAPSULE | Refills: 0 | Status: SHIPPED | OUTPATIENT
Start: 2024-12-17

## 2024-12-17 NOTE — PROGRESS NOTES
Reason For Visit:   GERD and thumb Pain    Combined HPI and A/P:      Diagnosis Orders   1. Gastroesophageal reflux disease without esophagitis        2. Benign prostatic hyperplasia with urinary hesitancy  tamsulosin (FLOMAX) 0.4 MG capsule      3. PTSD (post-traumatic stress disorder)  prazosin (MINIPRESS) 1 MG capsule          1.) GERD: This is a chronic, stable problem. He is currently taking Nexium 20 mg 1 cap daily. He feels that this is helping with his reflux symptoms. This will be continued.     2.) Left Thumb Pain:     - This is a chronic, worsening problem. He has had worsening of his left thumb pain. He is scheduled to have a joint replacement of the affected joint with Dr. Aparicio.      He is starting classes at [facility].        Return for previously scheduled appointment.    We discussed use, benefit, and side effects of prescribed medications.  All patient questions answered.   Patient agreed with treatment plan.   I reviewed available records in our system and care everywhere. In cases where records are not available, the records have been requested and will be reviewed when available.     Subjective      Past Surgical History:   Procedure Laterality Date    APPENDECTOMY      COLONOSCOPY  12/09/2008    Dr Rodas    MANDIBLE SURGERY Bilateral     had it wired shut    OTHER SURGICAL HISTORY  7/24/13, 1/24/14    cauterization of per-anal condyloma     Family History   Problem Relation Age of Onset    Diabetes Mother     Heart Disease Father     Cancer Father         liver    Lung Cancer Other         uncles on both sides     Social History     Tobacco Use    Smoking status: Every Day     Current packs/day: 1.50     Average packs/day: 1.5 packs/day for 34.0 years (51.0 ttl pk-yrs)     Types: Cigarettes    Smokeless tobacco: Never   Substance Use Topics    Alcohol use: No      Current Outpatient Medications   Medication Sig Dispense Refill    atomoxetine (STRATTERA) 25 MG capsule Take 1 capsule

## 2024-12-18 ENCOUNTER — HOSPITAL ENCOUNTER (OUTPATIENT)
Facility: HOSPITAL | Age: 54
Setting detail: HOSPITAL OUTPATIENT SURGERY
Discharge: HOME | End: 2024-12-18
Attending: ORTHOPAEDIC SURGERY | Admitting: ORTHOPAEDIC SURGERY
Payer: COMMERCIAL

## 2024-12-18 ENCOUNTER — ANESTHESIA (OUTPATIENT)
Dept: PERIOP | Facility: HOSPITAL | Age: 54
End: 2024-12-18
Payer: COMMERCIAL

## 2024-12-18 ENCOUNTER — APPOINTMENT (OUTPATIENT)
Dept: GENERAL RADIOLOGY | Facility: HOSPITAL | Age: 54
End: 2024-12-18
Payer: COMMERCIAL

## 2024-12-18 ENCOUNTER — ANESTHESIA EVENT (OUTPATIENT)
Dept: PERIOP | Facility: HOSPITAL | Age: 54
End: 2024-12-18
Payer: COMMERCIAL

## 2024-12-18 VITALS
OXYGEN SATURATION: 96 % | SYSTOLIC BLOOD PRESSURE: 120 MMHG | TEMPERATURE: 98.2 F | HEART RATE: 88 BPM | BODY MASS INDEX: 23.67 KG/M2 | DIASTOLIC BLOOD PRESSURE: 82 MMHG | RESPIRATION RATE: 16 BRPM | HEIGHT: 67 IN | WEIGHT: 150.79 LBS

## 2024-12-18 DIAGNOSIS — M18.12 PRIMARY OSTEOARTHRITIS OF FIRST CARPOMETACARPAL JOINT OF LEFT HAND: Primary | ICD-10-CM

## 2024-12-18 LAB
ALBUMIN SERPL-MCNC: 4.5 G/DL (ref 3.5–5.2)
ALBUMIN/GLOB SERPL: 1.5 G/DL
ALP SERPL-CCNC: 63 U/L (ref 39–117)
ALT SERPL W P-5'-P-CCNC: 14 U/L (ref 1–41)
ANION GAP SERPL CALCULATED.3IONS-SCNC: 10 MMOL/L (ref 5–15)
APTT PPP: 24.9 SECONDS (ref 24.5–36)
AST SERPL-CCNC: 17 U/L (ref 1–40)
BILIRUB SERPL-MCNC: 0.3 MG/DL (ref 0–1.2)
BUN SERPL-MCNC: 13 MG/DL (ref 6–20)
BUN/CREAT SERPL: 14.3 (ref 7–25)
CALCIUM SPEC-SCNC: 9.3 MG/DL (ref 8.6–10.5)
CHLORIDE SERPL-SCNC: 105 MMOL/L (ref 98–107)
CO2 SERPL-SCNC: 24 MMOL/L (ref 22–29)
CREAT SERPL-MCNC: 0.91 MG/DL (ref 0.76–1.27)
DEPRECATED RDW RBC AUTO: 43.2 FL (ref 37–54)
EGFRCR SERPLBLD CKD-EPI 2021: 99.5 ML/MIN/1.73
ERYTHROCYTE [DISTWIDTH] IN BLOOD BY AUTOMATED COUNT: 12.6 % (ref 12.3–15.4)
GLOBULIN UR ELPH-MCNC: 3.1 GM/DL
GLUCOSE SERPL-MCNC: 92 MG/DL (ref 65–99)
HCT VFR BLD AUTO: 46.5 % (ref 37.5–51)
HGB BLD-MCNC: 16 G/DL (ref 13–17.7)
INR PPP: 0.98 (ref 0.91–1.09)
MCH RBC QN AUTO: 32.7 PG (ref 26.6–33)
MCHC RBC AUTO-ENTMCNC: 34.4 G/DL (ref 31.5–35.7)
MCV RBC AUTO: 95.1 FL (ref 79–97)
PLATELET # BLD AUTO: 211 10*3/MM3 (ref 140–450)
PMV BLD AUTO: 9.1 FL (ref 6–12)
POTASSIUM SERPL-SCNC: 4 MMOL/L (ref 3.5–5.2)
PROT SERPL-MCNC: 7.6 G/DL (ref 6–8.5)
PROTHROMBIN TIME: 13.3 SECONDS (ref 11.8–14.8)
RBC # BLD AUTO: 4.89 10*6/MM3 (ref 4.14–5.8)
SODIUM SERPL-SCNC: 139 MMOL/L (ref 136–145)
WBC NRBC COR # BLD AUTO: 6.81 10*3/MM3 (ref 3.4–10.8)

## 2024-12-18 PROCEDURE — 25010000002 CEFAZOLIN PER 500 MG: Performed by: NURSE ANESTHETIST, CERTIFIED REGISTERED

## 2024-12-18 PROCEDURE — 85027 COMPLETE CBC AUTOMATED: CPT | Performed by: ORTHOPAEDIC SURGERY

## 2024-12-18 PROCEDURE — 25010000002 LIDOCAINE PF 2% 2 % SOLUTION: Performed by: NURSE ANESTHETIST, CERTIFIED REGISTERED

## 2024-12-18 PROCEDURE — 25010000002 FENTANYL CITRATE (PF) 50 MCG/ML SOLUTION: Performed by: ANESTHESIOLOGY

## 2024-12-18 PROCEDURE — 76000 FLUOROSCOPY <1 HR PHYS/QHP: CPT

## 2024-12-18 PROCEDURE — 80053 COMPREHEN METABOLIC PANEL: CPT | Performed by: ORTHOPAEDIC SURGERY

## 2024-12-18 PROCEDURE — 25010000002 LIDOCAINE 1% - EPINEPHRINE 1:100000 1 %-1:100000 SOLUTION: Performed by: ORTHOPAEDIC SURGERY

## 2024-12-18 PROCEDURE — 85610 PROTHROMBIN TIME: CPT | Performed by: ORTHOPAEDIC SURGERY

## 2024-12-18 PROCEDURE — 25010000002 ONDANSETRON PER 1 MG: Performed by: NURSE ANESTHETIST, CERTIFIED REGISTERED

## 2024-12-18 PROCEDURE — 25010000002 HYDROMORPHONE PER 4 MG: Performed by: ANESTHESIOLOGY

## 2024-12-18 PROCEDURE — 85730 THROMBOPLASTIN TIME PARTIAL: CPT | Performed by: ORTHOPAEDIC SURGERY

## 2024-12-18 PROCEDURE — 73120 X-RAY EXAM OF HAND: CPT

## 2024-12-18 PROCEDURE — 25810000003 LACTATED RINGERS PER 1000 ML: Performed by: ORTHOPAEDIC SURGERY

## 2024-12-18 PROCEDURE — 25010000002 MIDAZOLAM PER 1MG: Performed by: ANESTHESIOLOGY

## 2024-12-18 PROCEDURE — 25010000002 PROPOFOL 10 MG/ML EMULSION: Performed by: NURSE ANESTHETIST, CERTIFIED REGISTERED

## 2024-12-18 PROCEDURE — 25010000002 FENTANYL CITRATE (PF) 100 MCG/2ML SOLUTION: Performed by: NURSE ANESTHETIST, CERTIFIED REGISTERED

## 2024-12-18 DEVICE — SUT FW 2/0 TPR NDL 18MM AR7220: Type: IMPLANTABLE DEVICE | Site: WRIST | Status: FUNCTIONAL

## 2024-12-18 RX ORDER — FENTANYL CITRATE 50 UG/ML
50 INJECTION, SOLUTION INTRAMUSCULAR; INTRAVENOUS
Status: DISCONTINUED | OUTPATIENT
Start: 2024-12-18 | End: 2024-12-18 | Stop reason: HOSPADM

## 2024-12-18 RX ORDER — SODIUM CHLORIDE 0.9 % (FLUSH) 0.9 %
3 SYRINGE (ML) INJECTION AS NEEDED
Status: DISCONTINUED | OUTPATIENT
Start: 2024-12-18 | End: 2024-12-18 | Stop reason: HOSPADM

## 2024-12-18 RX ORDER — LIDOCAINE HYDROCHLORIDE AND EPINEPHRINE 10; 10 MG/ML; UG/ML
INJECTION, SOLUTION INFILTRATION; PERINEURAL AS NEEDED
Status: DISCONTINUED | OUTPATIENT
Start: 2024-12-18 | End: 2024-12-18 | Stop reason: HOSPADM

## 2024-12-18 RX ORDER — PROPOFOL 10 MG/ML
VIAL (ML) INTRAVENOUS AS NEEDED
Status: DISCONTINUED | OUTPATIENT
Start: 2024-12-18 | End: 2024-12-18 | Stop reason: SURG

## 2024-12-18 RX ORDER — LIDOCAINE HYDROCHLORIDE 10 MG/ML
0.5 INJECTION, SOLUTION EPIDURAL; INFILTRATION; INTRACAUDAL; PERINEURAL ONCE AS NEEDED
Status: DISCONTINUED | OUTPATIENT
Start: 2024-12-18 | End: 2024-12-18 | Stop reason: HOSPADM

## 2024-12-18 RX ORDER — MIDAZOLAM HYDROCHLORIDE 2 MG/2ML
1 INJECTION, SOLUTION INTRAMUSCULAR; INTRAVENOUS
Status: DISCONTINUED | OUTPATIENT
Start: 2024-12-18 | End: 2024-12-18 | Stop reason: HOSPADM

## 2024-12-18 RX ORDER — SODIUM CHLORIDE 0.9 % (FLUSH) 0.9 %
3 SYRINGE (ML) INJECTION EVERY 12 HOURS SCHEDULED
Status: DISCONTINUED | OUTPATIENT
Start: 2024-12-18 | End: 2024-12-18 | Stop reason: HOSPADM

## 2024-12-18 RX ORDER — ONDANSETRON 2 MG/ML
INJECTION INTRAMUSCULAR; INTRAVENOUS AS NEEDED
Status: DISCONTINUED | OUTPATIENT
Start: 2024-12-18 | End: 2024-12-18 | Stop reason: SURG

## 2024-12-18 RX ORDER — ONDANSETRON 2 MG/ML
4 INJECTION INTRAMUSCULAR; INTRAVENOUS
Status: DISCONTINUED | OUTPATIENT
Start: 2024-12-18 | End: 2024-12-18 | Stop reason: HOSPADM

## 2024-12-18 RX ORDER — SODIUM CHLORIDE 0.9 % (FLUSH) 0.9 %
3-10 SYRINGE (ML) INJECTION AS NEEDED
Status: DISCONTINUED | OUTPATIENT
Start: 2024-12-18 | End: 2024-12-18 | Stop reason: HOSPADM

## 2024-12-18 RX ORDER — MAGNESIUM HYDROXIDE 1200 MG/15ML
LIQUID ORAL AS NEEDED
Status: DISCONTINUED | OUTPATIENT
Start: 2024-12-18 | End: 2024-12-18 | Stop reason: HOSPADM

## 2024-12-18 RX ORDER — FENTANYL CITRATE 50 UG/ML
50 INJECTION, SOLUTION INTRAMUSCULAR; INTRAVENOUS ONCE
Status: DISCONTINUED | OUTPATIENT
Start: 2024-12-18 | End: 2024-12-18 | Stop reason: HOSPADM

## 2024-12-18 RX ORDER — CEFAZOLIN SODIUM 1 G/3ML
INJECTION, POWDER, FOR SOLUTION INTRAMUSCULAR; INTRAVENOUS AS NEEDED
Status: DISCONTINUED | OUTPATIENT
Start: 2024-12-18 | End: 2024-12-18 | Stop reason: SURG

## 2024-12-18 RX ORDER — LABETALOL HYDROCHLORIDE 5 MG/ML
5 INJECTION, SOLUTION INTRAVENOUS
Status: DISCONTINUED | OUTPATIENT
Start: 2024-12-18 | End: 2024-12-18 | Stop reason: HOSPADM

## 2024-12-18 RX ORDER — ACETAMINOPHEN 500 MG
1000 TABLET ORAL ONCE
Status: COMPLETED | OUTPATIENT
Start: 2024-12-18 | End: 2024-12-18

## 2024-12-18 RX ORDER — SODIUM CHLORIDE, SODIUM LACTATE, POTASSIUM CHLORIDE, CALCIUM CHLORIDE 600; 310; 30; 20 MG/100ML; MG/100ML; MG/100ML; MG/100ML
100 INJECTION, SOLUTION INTRAVENOUS CONTINUOUS
Status: DISCONTINUED | OUTPATIENT
Start: 2024-12-18 | End: 2024-12-18 | Stop reason: HOSPADM

## 2024-12-18 RX ORDER — SCOLOPAMINE TRANSDERMAL SYSTEM 1 MG/1
1 PATCH, EXTENDED RELEASE TRANSDERMAL ONCE
Status: DISCONTINUED | OUTPATIENT
Start: 2024-12-18 | End: 2024-12-18 | Stop reason: HOSPADM

## 2024-12-18 RX ORDER — FLUMAZENIL 0.1 MG/ML
0.2 INJECTION INTRAVENOUS AS NEEDED
Status: DISCONTINUED | OUTPATIENT
Start: 2024-12-18 | End: 2024-12-18 | Stop reason: HOSPADM

## 2024-12-18 RX ORDER — OXYCODONE AND ACETAMINOPHEN 5; 325 MG/1; MG/1
1 TABLET ORAL EVERY 6 HOURS PRN
Qty: 15 TABLET | Refills: 0 | Status: SHIPPED | OUTPATIENT
Start: 2024-12-18

## 2024-12-18 RX ORDER — MIDAZOLAM HYDROCHLORIDE 2 MG/2ML
2 INJECTION, SOLUTION INTRAMUSCULAR; INTRAVENOUS ONCE
Status: COMPLETED | OUTPATIENT
Start: 2024-12-18 | End: 2024-12-18

## 2024-12-18 RX ORDER — OXYCODONE AND ACETAMINOPHEN 10; 325 MG/1; MG/1
1 TABLET ORAL EVERY 4 HOURS PRN
Status: DISCONTINUED | OUTPATIENT
Start: 2024-12-18 | End: 2024-12-18 | Stop reason: HOSPADM

## 2024-12-18 RX ORDER — HYDROMORPHONE HYDROCHLORIDE 1 MG/ML
0.5 INJECTION, SOLUTION INTRAMUSCULAR; INTRAVENOUS; SUBCUTANEOUS
Status: DISCONTINUED | OUTPATIENT
Start: 2024-12-18 | End: 2024-12-18 | Stop reason: HOSPADM

## 2024-12-18 RX ORDER — IBUPROFEN 600 MG/1
600 TABLET, FILM COATED ORAL EVERY 6 HOURS PRN
Status: DISCONTINUED | OUTPATIENT
Start: 2024-12-18 | End: 2024-12-18 | Stop reason: HOSPADM

## 2024-12-18 RX ORDER — SODIUM CHLORIDE 9 MG/ML
40 INJECTION, SOLUTION INTRAVENOUS AS NEEDED
Status: DISCONTINUED | OUTPATIENT
Start: 2024-12-18 | End: 2024-12-18 | Stop reason: HOSPADM

## 2024-12-18 RX ORDER — LIDOCAINE HYDROCHLORIDE 20 MG/ML
INJECTION, SOLUTION EPIDURAL; INFILTRATION; INTRACAUDAL; PERINEURAL AS NEEDED
Status: DISCONTINUED | OUTPATIENT
Start: 2024-12-18 | End: 2024-12-18 | Stop reason: SURG

## 2024-12-18 RX ORDER — SODIUM CHLORIDE, SODIUM LACTATE, POTASSIUM CHLORIDE, CALCIUM CHLORIDE 600; 310; 30; 20 MG/100ML; MG/100ML; MG/100ML; MG/100ML
1000 INJECTION, SOLUTION INTRAVENOUS CONTINUOUS
Status: DISCONTINUED | OUTPATIENT
Start: 2024-12-18 | End: 2024-12-18 | Stop reason: HOSPADM

## 2024-12-18 RX ORDER — FENTANYL CITRATE 50 UG/ML
INJECTION, SOLUTION INTRAMUSCULAR; INTRAVENOUS AS NEEDED
Status: DISCONTINUED | OUTPATIENT
Start: 2024-12-18 | End: 2024-12-18 | Stop reason: SURG

## 2024-12-18 RX ORDER — NALOXONE HCL 0.4 MG/ML
0.04 VIAL (ML) INJECTION AS NEEDED
Status: DISCONTINUED | OUTPATIENT
Start: 2024-12-18 | End: 2024-12-18 | Stop reason: HOSPADM

## 2024-12-18 RX ORDER — PHENYLEPHRINE HCL IN 0.9% NACL 1 MG/10 ML
SYRINGE (ML) INTRAVENOUS AS NEEDED
Status: DISCONTINUED | OUTPATIENT
Start: 2024-12-18 | End: 2024-12-18 | Stop reason: SURG

## 2024-12-18 RX ADMIN — ONDANSETRON 4 MG: 2 INJECTION INTRAMUSCULAR; INTRAVENOUS at 14:18

## 2024-12-18 RX ADMIN — SCOLOPAMINE TRANSDERMAL SYSTEM 1 PATCH: 1 PATCH, EXTENDED RELEASE TRANSDERMAL at 12:15

## 2024-12-18 RX ADMIN — SODIUM CHLORIDE, POTASSIUM CHLORIDE, SODIUM LACTATE AND CALCIUM CHLORIDE 1000 ML: 600; 310; 30; 20 INJECTION, SOLUTION INTRAVENOUS at 11:41

## 2024-12-18 RX ADMIN — MIDAZOLAM HYDROCHLORIDE 2 MG: 1 INJECTION, SOLUTION INTRAMUSCULAR; INTRAVENOUS at 12:16

## 2024-12-18 RX ADMIN — Medication 100 MCG: at 13:47

## 2024-12-18 RX ADMIN — FENTANYL CITRATE 50 MCG: 50 INJECTION, SOLUTION INTRAMUSCULAR; INTRAVENOUS at 13:26

## 2024-12-18 RX ADMIN — HYDROMORPHONE HYDROCHLORIDE 0.5 MG: 1 INJECTION, SOLUTION INTRAMUSCULAR; INTRAVENOUS; SUBCUTANEOUS at 14:46

## 2024-12-18 RX ADMIN — FENTANYL CITRATE 50 MCG: 50 INJECTION, SOLUTION INTRAMUSCULAR; INTRAVENOUS at 13:13

## 2024-12-18 RX ADMIN — ACETAMINOPHEN TAB 500 MG 1000 MG: 500 TAB at 12:16

## 2024-12-18 RX ADMIN — PROPOFOL 200 MG: 10 INJECTION, EMULSION INTRAVENOUS at 13:14

## 2024-12-18 RX ADMIN — CEFAZOLIN 2 G: 1 INJECTION, POWDER, FOR SOLUTION INTRAMUSCULAR; INTRAVENOUS; PARENTERAL at 13:28

## 2024-12-18 RX ADMIN — FENTANYL CITRATE 50 MCG: 50 INJECTION, SOLUTION INTRAMUSCULAR; INTRAVENOUS at 14:51

## 2024-12-18 RX ADMIN — LIDOCAINE HYDROCHLORIDE 60 MG: 20 INJECTION, SOLUTION EPIDURAL; INFILTRATION; INTRACAUDAL; PERINEURAL at 13:14

## 2024-12-18 RX ADMIN — OXYCODONE AND ACETAMINOPHEN 1 TABLET: 325; 10 TABLET ORAL at 15:00

## 2024-12-18 NOTE — OP NOTE
Patient Name: Nell  : 1970  MRN: [id number]      DATE of SURGERY: 2024    SURGEON: Bill Moses MD    ASSISTANT: NONE    PREOPERATIVE DIAGNOSIS    Left thumb primary CMC osteoarthritis     POSTOPERATIVE DIAGNOSIS    1.  Left thumb primary CMC osteoarthritis     PROCEDURE PERFORMED    Left thumb CMC arthroplasty     IMPLANTS  None.       ANESTHESIA    General endotracheal with local anesthesia     OPERATIVE INDICATIONS    The patient is a 54 y.o. male who presented to my clinic with complaints of basilar thumb pain.  Patient failed conservative management and ultimately elected to proceed with operative intervention.  Risks, benefits and alternatives were discussed.  Risks including, but not limited to, bleeding, infection, wound healing problems, failure to alleviate any or all of her preoperative symptoms, symptomatic recurrence, need for additional procedures, numbness to the dorsal radial aspect of the hand, stiffness, weakness, adhesions and issues with anesthesia were all discussed.  All questions were answered preoperatively.  Written and informed consent were obtained.      ESTIMATED BLOOD LOSS    Less than 5 mL.       SPECIMENS    None.       DRAINS  None.       COMPLICATIONS    None.       PROCEDURE IN DETAIL    The patient was met in the preoperative holding room where the correct patient, procedure and side were confirmed.  The operative site was marked by myself with the patient's agreement.  The consent was signed by myself. Patient was transferred to the operating room, and a formal timeout was performed identifying the correct patient and the operative site. The tourniquet was then placed on the brachium of the operative extremity. A sterile prep and drape was performed.       A skin marker was used to delineate a Servin approach to the operative thumb CMC joint.  The operative upper extremity was then exsanguinated with an Esmarch and the tourniquet was inflated to 250 mmHg  for less than 45 minutes.  A 15 blade scalpel was used to incise only through the skin.  Full-thickness skin flaps were elevated down to the level of the thenar fascia.  Branches of the superficial radial sensory nerve were identified and protected.  The thenar fascia was then incised in line with the incision.  The thenar musculature was elevated off of the underlying CMC joint capsule and a radial to ulnar directed fashion.  A longitudinal thumb CMC arthrotomy was performed volar to the APL tendinous insertion into the base of the thumb metacarpal.  The thumb CMC joint was identified and diffuse grade IV chondromalacia was noted.  The trapezium was then skeletonized with care taken to protect the dorsal vasculature and FCR tendon volarly.  The trapezium was then removed piecemeal and the wound was irrigated to remove additional bony debris.  The scaphotrapezoid joint was inspected with no significant degenerative chondromalacia noted.  At this point, the suspensionplasty portion of the procedure was performed with FiberWire suture.  Longitudinal traction was placed at the thumb and the FiberWire was placed through the APL and FCR tendons creating an adequate suspension.  Prior to securing the suspensioplasty, longitudinal traction and a radial to ulnar directed force at the base of the thumb metacarpal were performed.  Adequate suspension was confirmed.  Fluoroscopic images confirmed adequate suspension and resection of the trapezium.  The wound was again irrigated with normal saline.  The capsule and thenar fascia were reapproximated with Vicryl sutures.  Skin edges were approximated with nylon sutures.  For postoperative pain control, 10 cc of 1% lidocaine with epinephrine were injected at the CMC joint.  A well-padded thumb spica splint was applied to the operative hand.  The tourniquet was deflated and adequate capillary refills retained all digits.  General anesthesia was reversed, patient was transferred to  hospital bed and moved to PACU in stable condition.  All counts the end the procedure were correct.  Patient tolerated the procedure well and was without intraoperative complication.     POSTOPERATIVE PLAN  1.  Discharged to home with family  2.  Keep splint in place until follow-up.  Do not get splint wet.  3.  Encouraged to elevate the operative extremity and perform gentle finger range of motion.  4.  Follow-up in 2 weeks for wound check and initiate therapy

## 2024-12-18 NOTE — ANESTHESIA POSTPROCEDURE EVALUATION
"Patient: Alex Ghotra    Procedure Summary       Date: 12/18/24 Room / Location: [facility] OR  /  [facility] OR    Anesthesia Start: 1311 Anesthesia Stop: 1427    Procedure: LEFT THUMB CARPOMETACARPAL ARTHROPLASTY (Left: Wrist) Diagnosis: (M18.12)    Surgeons: Bill Moses MD Provider: Sweetie Monsivais CRNA    Anesthesia Type: general with block ASA Status: 3            Anesthesia Type: general with block    Vitals  Vitals Value Taken Time   /71 12/18/24 1515   Temp 98.2 °F (36.8 °C) 12/18/24 1423   Pulse 84 12/18/24 1519   Resp 16 12/18/24 1515   SpO2 97 % 12/18/24 1519   Vitals shown include unfiled device data.        Post Anesthesia Care and Evaluation    Patient location during evaluation: PACU  Patient participation: complete - patient participated  Level of consciousness: awake and alert  Pain management: adequate    Airway patency: patent  Anesthetic complications: No anesthetic complications  PONV Status: none  Cardiovascular status: acceptable and hemodynamically stable  Respiratory status: acceptable  Hydration status: acceptable    Comments: Blood pressure 120/82, pulse 88, temperature 98.2 °F (36.8 °C), temperature source Temporal, resp. rate 16, height 170.2 cm (67.01\"), weight 68.4 kg (150 lb 12.7 oz), SpO2 96%.    Patient discharged from PACU based upon Emmanuelle score. Please see RN notes for further details    "

## 2024-12-18 NOTE — ANESTHESIA PROCEDURE NOTES
Airway  Urgency: elective    Date/Time: 12/18/2024 1:15 PM  Airway not difficult    General Information and Staff    Patient location during procedure: OR    Indications and Patient Condition  Indications for airway management: airway protection    Preoxygenated: yes  Mask difficulty assessment: 0 - not attempted    Final Airway Details  Final airway type: supraglottic airway      Successful airway: I-gel  Size 4     Number of attempts at approach: 1  Assessment: lips, teeth, and gum same as pre-op and atraumatic intubation

## 2024-12-18 NOTE — ANESTHESIA PREPROCEDURE EVALUATION
Anesthesia Evaluation     Patient summary reviewed and Nursing notes reviewed   no history of anesthetic complications:   NPO Solid Status: > 8 hours  NPO Liquid Status: > 8 hours           Airway   Mallampati: I  TM distance: >3 FB  Neck ROM: full  No difficulty expected  Dental    (+) edentulous    Pulmonary    (+) a smoker Current, asthma,  (-) sleep apnea  Cardiovascular   Exercise tolerance: good (4-7 METS)    (+) hyperlipidemia  (-) hypertension, past MI, CAD, dysrhythmias, cardiac stents      Neuro/Psych  (+) seizures  (-) TIA, CVA  GI/Hepatic/Renal/Endo    (+) hepatitis C, liver disease, thyroid problem   (-) no renal disease, diabetes    ROS Comment: HIV     Musculoskeletal     Abdominal    Substance History      OB/GYN          Other   autoimmune disease (HIV) ,                   Anesthesia Plan    ASA 3     general with block     (Pt declines PNB )  intravenous induction     Anesthetic plan, risks, benefits, and alternatives have been provided, discussed and informed consent has been obtained with: patient.      CODE STATUS:

## 2024-12-18 NOTE — H&P
[facility]   HISTORY AND PHYSICAL    Patient Name: Alex Ghotra  : 1970  MRN: [id number]  Primary Care Physician:  Basil Santiago MD  Date of admission: 2024    Subjective   Subjective     Chief Complaint: Left thumb pain    History of Present Illness  Mr. Ghotra is a 54-year-old gentleman who presents today for planned left thumb CMC primary osteoarthritis.  He reports no significant interval medical changes.    Review of Systems   Complete review of systems was reviewed today and is unremarkable except for HPI.    Personal History     Past Medical History:   Diagnosis Date    ADHD (attention deficit hyperactivity disorder)     AIDS     Asthma     Chronic pain of left thumb 2024    COPD (chronic obstructive pulmonary disease)     Disease of thyroid gland     GERD (gastroesophageal reflux disease)     Hep C w/o coma, chronic     Hyperlipidemia        Past Surgical History:   Procedure Laterality Date    APPENDECTOMY      COLONOSCOPY      ? ([facility]) polyps    COLONOSCOPY N/A 2022    Procedure: COLONOSCOPY WITH ANESTHESIA;  Surgeon: Michael Israel MD;  Location: [facility] ENDOSCOPY;  Service: Gastroenterology;  Laterality: N/A;  pre hx colon polyp  post; normal  Alice Anderson MD    ENDOSCOPY      ENDOSCOPY N/A 2022    Procedure: ESOPHAGOGASTRODUODENOSCOPY WITH ANESTHESIA;  Surgeon: Michael Israel MD;  Location: [facility] ENDOSCOPY;  Service: Gastroenterology;  Laterality: N/A;  pre dysphagia  post candidiasis; stricture dilated  Alice Anderson MD    ENDOSCOPY N/A 2023    Procedure: ESOPHAGOGASTRODUODENOSCOPY WITH ANESTHESIA;  Surgeon: Michael Israel MD;  Location: [facility] ENDOSCOPY;  Service: Gastroenterology;  Laterality: N/A;  Pre: Epigastric pain, Dysphagia, Abnormal CT of the abdomen  Post: esophagitis   Alice Anderson MD    ENDOSCOPY N/A 2024    Procedure: ESOPHAGOGASTRODUODENOSCOPY WITH ANESTHESIA;  Surgeon: Lindy  Michael TEE MD;  Location: [facility] ENDOSCOPY;  Service: Gastroenterology;  Laterality: N/A;  preop; dysphagia  postop r/o candida; r/o barretts; esophageal stricture   PCP Basil Cano    MANDIBLE FRACTURE SURGERY         Family History   Problem Relation Age of Onset    Heart attack Mother     Diabetes Mother     Heart attack Father     Liver cancer Father     Colon cancer Neg Hx     Colon polyps Neg Hx        Social History     Socioeconomic History    Marital status:    Tobacco Use    Smoking status: Every Day     Current packs/day: 0.50     Average packs/day: 0.5 packs/day for 40.0 years (20.0 ttl pk-yrs)     Types: Cigarettes     Passive exposure: Current    Smokeless tobacco: Current     Types: Snuff   Vaping Use    Vaping status: Former    Substances: Nicotine    Devices: Disposable    Passive vaping exposure: Yes   Substance and Sexual Activity    Alcohol use: No    Drug use: Not Currently     Types: Methamphetamines, Marijuana, Heroin, LSD, Cocaine(coke)     Comment: Clean for 22 months    Sexual activity: Defer       Prior to Admission medications    Medication Sig Start Date End Date Taking? Authorizing Provider   albuterol sulfate  (90 Base) MCG/ACT inhaler Inhale 2 puffs Every 4 (Four) Hours As Needed for Wheezing or Shortness of Air (Rinse Mouth after use). 12/21/23  Yes Kezia King APRN   atomoxetine (STRATTERA) 25 MG capsule Take 1 capsule by mouth Every Morning.   Yes Rudy Rendon MD   buPROPion XL (WELLBUTRIN XL) 300 MG 24 hr tablet Take 1 tablet by mouth Every Morning.   Yes Rudy Rendon MD   Cholecalciferol (Vitamin D3) 50 MCG (2000 UT) capsule Take 1 capsule by mouth Daily. 5/2/22  Yes Emergency, Nurse Epic, RN   esomeprazole (nexIUM) 20 MG capsule Take 1 capsule by mouth Every Morning Before Breakfast.   Yes Rudy Rendon MD   Prezcobix 800-150 MG per tablet Take 1 tablet by mouth Daily. 12/19/23  Yes Rudy Rendon MD   rosuvastatin  "(CRESTOR) 5 MG tablet Take 1 tablet by mouth Every Night.   Yes Provider, MD Rudy   Triumeq 600- MG per tablet Take 1 tablet by mouth daily; Take 2 hours before or 6 hours after Calcium, Antacids or Vitamins 9/17/24  Yes Provider, MD Rudy   Juluca 50-25 MG per tablet Take 1 tablet by mouth daily; Stop Edurant, Prezcobix, Tivicay, Descovy  Patient not taking: Reported on 12/17/2024 5/1/23   Provider, Rudy, MD       Ziagen [abacavir], Amoxicillin, Other, Remeron [mirtazapine], and Bactrim [sulfamethoxazole-trimethoprim]    Objective    Objective     Vitals:   /79 (BP Location: Right arm, Patient Position: Lying)   Pulse 80   Temp 97.9 °F (36.6 °C) (Temporal)   Resp 20   Ht 170.2 cm (67.01\")   Wt 68.4 kg (150 lb 12.7 oz)   SpO2 96%   BMI 23.61 kg/m²     Physical Exam    General: Awake, alert, no acute distress  HEENT: Head is normocephalic, atraumatic.  No gross visual or auditory acuity deficits.  Respiratory: Nonlabored  Cardiovascular: Regular rate  Left upper extremity: No open skin wounds or lesions.  Stable neurovascular exam to the left hand.  Psychiatric: Calm and cooperative  Neurologic: Alert and oriented x3      Assessment & Plan   Assessment / Plan     Brief Patient Summary:  Alex Ghotra is a 54 y.o. male who presents today for planned left thumb CMC arthroplasty.    Active Hospital Problems:  There are no active hospital problems to display for this patient.    Plan:   See above      Bill Moses MD   "

## 2024-12-23 ENCOUNTER — NURSE ONLY (OUTPATIENT)
Age: 54
End: 2024-12-23

## 2024-12-23 DIAGNOSIS — M18.12 LOCALIZED PRIMARY OSTEOARTHRITIS OF CARPOMETACARPAL JOINT OF LEFT THUMB: Primary | ICD-10-CM

## 2024-12-23 NOTE — PROGRESS NOTES
Casting Notes:    Type of cast/splint: Arm, Forearm Splint Application     Material Used:  1. 3 rolls 2 inch cast padding      2.      3.    Fiberglass Cast Tape: 10 sheets 4x15 inch plaster     Reason for Application:     ICD-10-CM    1. Localized primary osteoarthritis of carpometacarpal joint of left thumb  M18.12       Patient was placed in a short arm plaster thumb spica splint with no complications.      Patient was given cast care instructions, and told to call the office with any complaints, or concerns.     Patient was also told to keep their routine follow up appointment.    Dean Mckay MA

## 2024-12-24 ASSESSMENT — ENCOUNTER SYMPTOMS
VOMITING: 0
BACK PAIN: 1
TROUBLE SWALLOWING: 0
COUGH: 0
NAUSEA: 0
DIARRHEA: 0
ABDOMINAL PAIN: 0
SHORTNESS OF BREATH: 0
CONSTIPATION: 0

## 2024-12-28 ENCOUNTER — OFFICE VISIT (OUTPATIENT)
Age: 54
End: 2024-12-28

## 2024-12-28 VITALS
TEMPERATURE: 98.3 F | RESPIRATION RATE: 18 BRPM | SYSTOLIC BLOOD PRESSURE: 104 MMHG | DIASTOLIC BLOOD PRESSURE: 60 MMHG | OXYGEN SATURATION: 96 % | WEIGHT: 152 LBS | BODY MASS INDEX: 23.8 KG/M2 | HEART RATE: 108 BPM

## 2024-12-28 DIAGNOSIS — J44.1 COPD EXACERBATION (HCC): Primary | ICD-10-CM

## 2024-12-28 DIAGNOSIS — J02.9 SORE THROAT: ICD-10-CM

## 2024-12-28 DIAGNOSIS — R06.2 WHEEZING: ICD-10-CM

## 2024-12-28 DIAGNOSIS — R05.1 ACUTE COUGH: ICD-10-CM

## 2024-12-28 LAB
INFLUENZA A ANTIBODY: NORMAL
INFLUENZA B ANTIBODY: NORMAL
Lab: NORMAL
QC PASS/FAIL: NORMAL
S PYO AG THROAT QL: NORMAL
SARS-COV-2, POC: NORMAL

## 2024-12-28 RX ORDER — DEXAMETHASONE SODIUM PHOSPHATE 10 MG/ML
10 INJECTION INTRAMUSCULAR; INTRAVENOUS ONCE
Status: COMPLETED | OUTPATIENT
Start: 2024-12-28 | End: 2024-12-28

## 2024-12-28 RX ORDER — PREDNISONE 10 MG/1
10 TABLET ORAL 2 TIMES DAILY
Qty: 10 TABLET | Refills: 0 | Status: SHIPPED | OUTPATIENT
Start: 2024-12-28 | End: 2025-01-02

## 2024-12-28 RX ORDER — BROMPHENIRAMINE MALEATE, PSEUDOEPHEDRINE HYDROCHLORIDE, AND DEXTROMETHORPHAN HYDROBROMIDE 2; 30; 10 MG/5ML; MG/5ML; MG/5ML
10 SYRUP ORAL 4 TIMES DAILY PRN
Qty: 200 ML | Refills: 0 | Status: SHIPPED | OUTPATIENT
Start: 2024-12-28 | End: 2025-01-02

## 2024-12-28 RX ADMIN — DEXAMETHASONE SODIUM PHOSPHATE 10 MG: 10 INJECTION INTRAMUSCULAR; INTRAVENOUS at 13:10

## 2024-12-28 ASSESSMENT — ENCOUNTER SYMPTOMS
WHEEZING: 0
COLOR CHANGE: 0
EYE DISCHARGE: 0
SORE THROAT: 1
NAUSEA: 0
VOMITING: 0
ABDOMINAL PAIN: 0
EYE PAIN: 0
DIARRHEA: 0
RHINORRHEA: 0
ABDOMINAL DISTENTION: 0
CONSTIPATION: 0
SHORTNESS OF BREATH: 0
APNEA: 0
SINUS PRESSURE: 0
TROUBLE SWALLOWING: 0
CHEST TIGHTNESS: 0
SINUS PAIN: 0
EYE ITCHING: 0
COUGH: 1

## 2024-12-28 NOTE — PROGRESS NOTES
[facility]  [facility]  [address] [place] [id number]  Dept: [phone]  Dept Fax: [phone]  Loc: [phone]    Preston Hernandez is a 54 y.o. male who presents today for his medical conditions/complaints as noted below.  Preston Hernandez is complaining of Cough and Congestion      HPI:     Patient presents for evaluation of a dry cough, congestion, and sore throat that started last night. Has used cough drops OTC without relief. Denies fevers. Denies known sick contacts but states he has been around a lot of people recently. Reports his throat feels like sandpaper when coughing. Patient reports he has had an issue with an \"itchy throat\" for a while. Reports he has been scoped and they told him not to use his albuterol inhaler as much due to thrush causing the itchiness. Reports a history of COPD.    Past Medical History:   Diagnosis Date    Arthritis     Asthma     bronchial    Back pain     Confusion     Depression     Drug abuse (HCC)     Genital warts     Hep C w/o coma, chronic (HCC)     HIV (human immunodeficiency virus infection) ([facility]) 1986    Tobacco abuse        Past Surgical History:   Procedure Laterality Date    APPENDECTOMY      COLONOSCOPY  12/09/2008    Dr Rodas    MANDIBLE SURGERY Bilateral     had it wired shut    OTHER SURGICAL HISTORY  7/24/13, 1/24/14    cauterization of per-anal condyloma       Family History   Problem Relation Age of Onset    Diabetes Mother     Heart Disease Father     Cancer Father         liver    Lung Cancer Other         uncles on both sides       Social History     Tobacco Use    Smoking status: Every Day     Current packs/day: 1.50     Average packs/day: 1.5 packs/day for 34.0 years (51.0 ttl pk-yrs)     Types: Cigarettes    Smokeless tobacco: Never   Substance Use Topics    Alcohol use: No        Current Outpatient Medications   Medication Sig Dispense Refill    predniSONE (DELTASONE) 10 MG

## 2024-12-28 NOTE — PATIENT INSTRUCTIONS
Flu, Strep, and COVID negative.   Dry cough and wheezing likely due to COPD exacerbation.  Use inhalers as indicated.   Steroid injection given in office today.  Start prednisone tomorrow.  Take cough medicine as prescribed.   The patient is to follow up with PCP or return to clinic if symptoms worsen/fail to improve.

## 2024-12-28 NOTE — PROGRESS NOTES
Medication was administered by Isac Christianson MA at 1:10 PM.    Medication: dexamethasone  Amount: 10mg  Route: intramuscular  Site: Dorsogluteal left    Patient tolerated well.

## 2025-01-03 ENCOUNTER — TELEPHONE (OUTPATIENT)
Age: 55
End: 2025-01-03

## 2025-01-03 ENCOUNTER — OFFICE VISIT (OUTPATIENT)
Age: 55
End: 2025-01-03

## 2025-01-03 VITALS — HEIGHT: 67 IN | BODY MASS INDEX: 23.86 KG/M2 | WEIGHT: 152 LBS

## 2025-01-03 DIAGNOSIS — M18.12 LOCALIZED PRIMARY OSTEOARTHRITIS OF CARPOMETACARPAL JOINT OF LEFT THUMB: Primary | ICD-10-CM

## 2025-01-03 PROCEDURE — 99024 POSTOP FOLLOW-UP VISIT: CPT | Performed by: ORTHOPAEDIC SURGERY

## 2025-01-03 RX ORDER — OXYCODONE HYDROCHLORIDE AND ACETAMINOPHEN 7.5; 325 MG/1; MG/1
1 TABLET ORAL EVERY 6 HOURS PRN
Qty: 15 TABLET | Refills: 0 | Status: SHIPPED | OUTPATIENT
Start: 2025-01-03 | End: 2025-01-10

## 2025-01-03 NOTE — TELEPHONE ENCOUNTER
Pt is needing the generic brand of percocet called into the pharmacy. Pharmacy does not carry percocet.

## 2025-01-03 NOTE — PROGRESS NOTES
[facility]  [facility]  [address]  [facility] [id number] [phone]     Patient: Preston Hernandez   YOB: 1970   Date: 1/3/2025     History of Present Illness  Preston is a 54 y.o. male who returns today for follow-up of left thumb CMC arthroplasty on 12/18/2024.  Patient reports no significant interval medical issues.  Difficult to determine how much, if any, symptoms have improved from preoperative status.  He is asking for refill of pain medication today.      Past Medical History:   Diagnosis Date    Arthritis     Asthma     bronchial    Back pain     Confusion     Depression     Drug abuse ([facility])     Genital warts     Hep C w/o coma, chronic ([facility])     HIV (human immunodeficiency virus infection) ([facility]) 1986    Tobacco abuse       Past Surgical History:   Procedure Laterality Date    APPENDECTOMY      COLONOSCOPY  12/09/2008    Dr Rodas    MANDIBLE SURGERY Bilateral     had it wired shut    OTHER SURGICAL HISTORY  7/24/13, 1/24/14    cauterization of per-anal condyloma      Social History     Socioeconomic History    Marital status:      Spouse name: None    Number of children: None    Years of education: None    Highest education level: None   Tobacco Use    Smoking status: Every Day     Current packs/day: 1.50     Average packs/day: 1.5 packs/day for 34.0 years (51.0 ttl pk-yrs)     Types: Cigarettes    Smokeless tobacco: Never   Vaping Use    Vaping status: Former   Substance and Sexual Activity    Alcohol use: No    Drug use: No    Sexual activity: Yes     Partners: Female     Social Determinants of Health     Financial Resource Strain: Low Risk  (6/7/2024)    Overall Financial Resource Strain (CARDIA)     Difficulty of Paying Living Expenses: Not hard at all   Food Insecurity: No Food Insecurity (6/7/2024)    Hunger Vital Sign     Worried About Running Out of Food in the Last Year: Never true     Ran

## 2025-01-03 NOTE — PROGRESS NOTES
Patient was taken out of post op splint before x-rays. There was no sign of infection or skin breakdown.

## 2025-01-04 DIAGNOSIS — M18.12 LOCALIZED PRIMARY OSTEOARTHRITIS OF CARPOMETACARPAL JOINT OF LEFT THUMB: ICD-10-CM

## 2025-01-06 DIAGNOSIS — M18.12 LOCALIZED PRIMARY OSTEOARTHRITIS OF CARPOMETACARPAL JOINT OF LEFT THUMB: ICD-10-CM

## 2025-01-06 ASSESSMENT — PATIENT HEALTH QUESTIONNAIRE - PHQ9
10. IF YOU CHECKED OFF ANY PROBLEMS, HOW DIFFICULT HAVE THESE PROBLEMS MADE IT FOR YOU TO DO YOUR WORK, TAKE CARE OF THINGS AT HOME, OR GET ALONG WITH OTHER PEOPLE: SOMEWHAT DIFFICULT
SUM OF ALL RESPONSES TO PHQ QUESTIONS 1-9: 12
6. FEELING BAD ABOUT YOURSELF - OR THAT YOU ARE A FAILURE OR HAVE LET YOURSELF OR YOUR FAMILY DOWN: SEVERAL DAYS
4. FEELING TIRED OR HAVING LITTLE ENERGY: MORE THAN HALF THE DAYS
2. FEELING DOWN, DEPRESSED OR HOPELESS: SEVERAL DAYS
7. TROUBLE CONCENTRATING ON THINGS, SUCH AS READING THE NEWSPAPER OR WATCHING TELEVISION: MORE THAN HALF THE DAYS
1. LITTLE INTEREST OR PLEASURE IN DOING THINGS: MORE THAN HALF THE DAYS
8. MOVING OR SPEAKING SO SLOWLY THAT OTHER PEOPLE COULD HAVE NOTICED. OR THE OPPOSITE, BEING SO FIGETY OR RESTLESS THAT YOU HAVE BEEN MOVING AROUND A LOT MORE THAN USUAL: NEARLY EVERY DAY
7. TROUBLE CONCENTRATING ON THINGS, SUCH AS READING THE NEWSPAPER OR WATCHING TELEVISION: MORE THAN HALF THE DAYS
6. FEELING BAD ABOUT YOURSELF - OR THAT YOU ARE A FAILURE OR HAVE LET YOURSELF OR YOUR FAMILY DOWN: SEVERAL DAYS
3. TROUBLE FALLING OR STAYING ASLEEP: SEVERAL DAYS
8. MOVING OR SPEAKING SO SLOWLY THAT OTHER PEOPLE COULD HAVE NOTICED. OR THE OPPOSITE - BEING SO FIDGETY OR RESTLESS THAT YOU HAVE BEEN MOVING AROUND A LOT MORE THAN USUAL: NEARLY EVERY DAY
SUM OF ALL RESPONSES TO PHQ QUESTIONS 1-9: 12
9. THOUGHTS THAT YOU WOULD BE BETTER OFF DEAD, OR OF HURTING YOURSELF: NOT AT ALL
SUM OF ALL RESPONSES TO PHQ QUESTIONS 1-9: 12
SUM OF ALL RESPONSES TO PHQ9 QUESTIONS 1 & 2: 3
5. POOR APPETITE OR OVEREATING: NOT AT ALL
1. LITTLE INTEREST OR PLEASURE IN DOING THINGS: MORE THAN HALF THE DAYS
2. FEELING DOWN, DEPRESSED OR HOPELESS: SEVERAL DAYS
10. IF YOU CHECKED OFF ANY PROBLEMS, HOW DIFFICULT HAVE THESE PROBLEMS MADE IT FOR YOU TO DO YOUR WORK, TAKE CARE OF THINGS AT HOME, OR GET ALONG WITH OTHER PEOPLE: SOMEWHAT DIFFICULT
4. FEELING TIRED OR HAVING LITTLE ENERGY: MORE THAN HALF THE DAYS
5. POOR APPETITE OR OVEREATING: NOT AT ALL
3. TROUBLE FALLING OR STAYING ASLEEP: SEVERAL DAYS
SUM OF ALL RESPONSES TO PHQ QUESTIONS 1-9: 12
9. THOUGHTS THAT YOU WOULD BE BETTER OFF DEAD, OR OF HURTING YOURSELF: NOT AT ALL
SUM OF ALL RESPONSES TO PHQ QUESTIONS 1-9: 12

## 2025-01-07 ENCOUNTER — OFFICE VISIT (OUTPATIENT)
Dept: PRIMARY CARE CLINIC | Age: 55
End: 2025-01-07
Payer: MEDICAID

## 2025-01-07 VITALS
SYSTOLIC BLOOD PRESSURE: 122 MMHG | WEIGHT: 180 LBS | OXYGEN SATURATION: 98 % | HEIGHT: 67 IN | HEART RATE: 117 BPM | BODY MASS INDEX: 28.25 KG/M2 | DIASTOLIC BLOOD PRESSURE: 78 MMHG

## 2025-01-07 DIAGNOSIS — R10.9 RIGHT FLANK PAIN: Primary | ICD-10-CM

## 2025-01-07 DIAGNOSIS — R53.82 CHRONIC FATIGUE: ICD-10-CM

## 2025-01-07 DIAGNOSIS — N52.8 OTHER MALE ERECTILE DYSFUNCTION: ICD-10-CM

## 2025-01-07 PROCEDURE — G8419 CALC BMI OUT NRM PARAM NOF/U: HCPCS | Performed by: FAMILY MEDICINE

## 2025-01-07 PROCEDURE — 3017F COLORECTAL CA SCREEN DOC REV: CPT | Performed by: FAMILY MEDICINE

## 2025-01-07 PROCEDURE — 99214 OFFICE O/P EST MOD 30 MIN: CPT | Performed by: FAMILY MEDICINE

## 2025-01-07 PROCEDURE — G8427 DOCREV CUR MEDS BY ELIG CLIN: HCPCS | Performed by: FAMILY MEDICINE

## 2025-01-07 PROCEDURE — 4004F PT TOBACCO SCREEN RCVD TLK: CPT | Performed by: FAMILY MEDICINE

## 2025-01-07 RX ORDER — OXYCODONE AND ACETAMINOPHEN 7.5; 325 MG/1; MG/1
1 TABLET ORAL EVERY 6 HOURS PRN
Qty: 15 TABLET | Refills: 0 | Status: SHIPPED | OUTPATIENT
Start: 2025-01-07 | End: 2025-01-14

## 2025-01-07 RX ORDER — TADALAFIL 20 MG/1
20 TABLET ORAL DAILY PRN
Qty: 30 TABLET | Refills: 0 | Status: SHIPPED | OUTPATIENT
Start: 2025-01-07

## 2025-01-07 NOTE — PROGRESS NOTES
palpitations.   Gastrointestinal:  Negative for abdominal pain, constipation, diarrhea, nausea and vomiting.   Genitourinary:  Positive for difficulty urinating.   Musculoskeletal:  Positive for arthralgias.   Skin:  Negative for rash and wound.   Neurological:  Negative for dizziness and syncope.         Objective:   /78   Pulse (!) 117   Ht 1.702 m (5' 7.01\")   Wt 81.6 kg (180 lb)   SpO2 98%   BMI 28.19 kg/m²   Physical Exam  Vitals and nursing note reviewed.   Constitutional:       General: He is not in acute distress.     Appearance: Normal appearance.   HENT:      Head: Normocephalic and atraumatic.   Cardiovascular:      Rate and Rhythm: Normal rate and regular rhythm.   Pulmonary:      Effort: Pulmonary effort is normal.      Breath sounds: Normal breath sounds. No wheezing.   Chest:      Chest wall: No tenderness.   Abdominal:      General: Abdomen is flat. Bowel sounds are normal.      Tenderness: There is no abdominal tenderness.   Musculoskeletal:         General: Tenderness present.   Skin:     General: Skin is warm and dry.   Neurological:      Mental Status: He is alert.   Psychiatric:         Mood and Affect: Mood normal.         Behavior: Behavior normal.         Thought Content: Thought content normal.           No results found for this visit on 01/07/25.          Isaac Quiroz MD

## 2025-01-08 DIAGNOSIS — R53.82 CHRONIC FATIGUE: ICD-10-CM

## 2025-01-08 DIAGNOSIS — R10.9 RIGHT FLANK PAIN: ICD-10-CM

## 2025-01-08 LAB
ALBUMIN SERPL-MCNC: 4.2 G/DL (ref 3.5–5.2)
ALP SERPL-CCNC: 63 U/L (ref 40–129)
ALT SERPL-CCNC: 13 U/L (ref 5–41)
ANION GAP SERPL CALCULATED.3IONS-SCNC: 10 MMOL/L (ref 7–19)
AST SERPL-CCNC: 13 U/L (ref 5–40)
BILIRUB SERPL-MCNC: 0.3 MG/DL (ref 0.2–1.2)
BILIRUB UR QL STRIP: NEGATIVE
BUN SERPL-MCNC: 13 MG/DL (ref 6–20)
CALCIUM SERPL-MCNC: 9.3 MG/DL (ref 8.6–10)
CHLORIDE SERPL-SCNC: 100 MMOL/L (ref 98–111)
CLARITY UR: CLEAR
CO2 SERPL-SCNC: 28 MMOL/L (ref 22–29)
COLOR UR: YELLOW
CREAT SERPL-MCNC: 0.9 MG/DL (ref 0.7–1.2)
GLUCOSE SERPL-MCNC: 96 MG/DL (ref 70–99)
GLUCOSE UR STRIP.AUTO-MCNC: NEGATIVE MG/DL
HGB UR STRIP.AUTO-MCNC: NEGATIVE MG/L
KETONES UR STRIP.AUTO-MCNC: NEGATIVE MG/DL
LEUKOCYTE ESTERASE UR QL STRIP.AUTO: NEGATIVE
NITRITE UR QL STRIP.AUTO: NEGATIVE
PH UR STRIP.AUTO: 5.5 [PH] (ref 5–8)
POTASSIUM SERPL-SCNC: 4.3 MMOL/L (ref 3.5–5)
PROT SERPL-MCNC: 7.3 G/DL (ref 6.4–8.3)
PROT UR STRIP.AUTO-MCNC: NEGATIVE MG/DL
PSA SERPL-MCNC: 0.55 NG/ML (ref 0–4)
SODIUM SERPL-SCNC: 138 MMOL/L (ref 136–145)
SP GR UR STRIP.AUTO: 1.02 (ref 1–1.03)
UROBILINOGEN UR STRIP.AUTO-MCNC: 0.2 E.U./DL

## 2025-01-08 RX ORDER — OXYCODONE HYDROCHLORIDE AND ACETAMINOPHEN 7.5; 325 MG/1; MG/1
1 TABLET ORAL EVERY 6 HOURS PRN
Qty: 15 TABLET | Refills: 0 | OUTPATIENT
Start: 2025-01-08 | End: 2025-01-15

## 2025-01-13 LAB
SHBG SERPL-SCNC: 18 NMOL/L (ref 19–76)
SHBG SERPL-SCNC: 68.5 PG/ML (ref 47–244)
TESTOST SERPL-MCNC: 260 NG/DL (ref 193–740)

## 2025-01-14 DIAGNOSIS — M18.12 LOCALIZED PRIMARY OSTEOARTHRITIS OF CARPOMETACARPAL JOINT OF LEFT THUMB: ICD-10-CM

## 2025-01-14 ASSESSMENT — ENCOUNTER SYMPTOMS
SHORTNESS OF BREATH: 0
COUGH: 0
DIARRHEA: 0
CONSTIPATION: 0
TROUBLE SWALLOWING: 0
NAUSEA: 0
ABDOMINAL PAIN: 0
VOMITING: 0

## 2025-01-15 ENCOUNTER — HOSPITAL ENCOUNTER (OUTPATIENT)
Dept: MRI IMAGING | Age: 55
Discharge: HOME | End: 2025-01-15
Payer: MEDICAID

## 2025-01-15 DIAGNOSIS — H91.8X3 ASYMMETRICAL HEARING LOSS: ICD-10-CM

## 2025-01-15 PROCEDURE — 70553 MRI BRAIN STEM W/O & W/DYE: CPT

## 2025-01-15 PROCEDURE — A9577 INJ MULTIHANCE: HCPCS | Performed by: NURSE PRACTITIONER

## 2025-01-15 PROCEDURE — 6360000004 HC RX CONTRAST MEDICATION: Performed by: NURSE PRACTITIONER

## 2025-01-15 RX ADMIN — GADOBENATE DIMEGLUMINE 15 ML: 529 INJECTION, SOLUTION INTRAVENOUS at 13:47

## 2025-01-16 ENCOUNTER — TELEPHONE (OUTPATIENT)
Dept: ENT CLINIC | Age: 55
End: 2025-01-16

## 2025-01-16 ENCOUNTER — TRANSCRIBE ORDERS (OUTPATIENT)
Dept: ADMINISTRATIVE | Facility: HOSPITAL | Age: 55
End: 2025-01-16
Payer: COMMERCIAL

## 2025-01-16 DIAGNOSIS — B18.2 CHRONIC HEPATITIS C WITHOUT HEPATIC COMA: Primary | ICD-10-CM

## 2025-01-16 NOTE — TELEPHONE ENCOUNTER
Called patient and reviewed MRI of IAC. MRI was negative for IAC mass. He voiced understanding. Keep follow up as scheduled.

## 2025-01-23 RX ORDER — OXYCODONE AND ACETAMINOPHEN 7.5; 325 MG/1; MG/1
1 TABLET ORAL EVERY 6 HOURS PRN
Qty: 15 TABLET | Refills: 0 | Status: SHIPPED | OUTPATIENT
Start: 2025-01-23 | End: 2025-01-30

## 2025-01-28 ENCOUNTER — HOSPITAL ENCOUNTER (OUTPATIENT)
Dept: ULTRASOUND IMAGING | Facility: HOSPITAL | Age: 55
Discharge: HOME | End: 2025-01-28
Admitting: NURSE PRACTITIONER
Payer: COMMERCIAL

## 2025-01-28 DIAGNOSIS — B18.2 CHRONIC HEPATITIS C WITHOUT HEPATIC COMA: ICD-10-CM

## 2025-01-28 PROCEDURE — 76700 US EXAM ABDOM COMPLETE: CPT

## 2025-01-30 NOTE — PROGRESS NOTES
extremities  Musculoskeletal: Left upper extremity: Surgical incision is well-healed with no significant surrounding edema, erythema or ecchymosis.  No significant tenderness to palpation.  Gross sensation is intact about the thumb.  Gentle thumb circumduction is well-tolerated.  Active flexion and extension of the thumb is well-tolerated and improved.  Stable neurovascular exam to the left hand.    Imaging  None      Plan    Preston is a 54 y.o. male who returns for follow-up after left thumb CMC arthroplasty on 12/18/2024, doing well.  He will continue with occupational therapy and progress with activities as tolerated.  Plan to return in 6 weeks for repeat evaluation and, if doing well, will likely be released.      This dictation was generated by voice recognition computer software. Although all attempts are made to edit the dictation for accuracy, there may be errors in the transcription that are not intended.    Electronically signed by Fred Aparicio MD on 1/31/2025 at 1:13 PM    
.

## 2025-01-31 ENCOUNTER — OFFICE VISIT (OUTPATIENT)
Age: 55
End: 2025-01-31

## 2025-01-31 VITALS — WEIGHT: 180 LBS | BODY MASS INDEX: 28.25 KG/M2 | HEIGHT: 67 IN

## 2025-01-31 DIAGNOSIS — M18.12 LOCALIZED PRIMARY OSTEOARTHRITIS OF CARPOMETACARPAL JOINT OF LEFT THUMB: Primary | ICD-10-CM

## 2025-01-31 PROCEDURE — 99024 POSTOP FOLLOW-UP VISIT: CPT | Performed by: ORTHOPAEDIC SURGERY

## 2025-03-17 NOTE — PROGRESS NOTES
capsule 3    esomeprazole (NEXIUM) 20 MG delayed release capsule Take 1 capsule by mouth every morning (before breakfast) 90 capsule 3    rosuvastatin (CRESTOR) 5 MG tablet Take 1 tablet by mouth daily      albuterol sulfate HFA (VENTOLIN HFA) 108 (90 Base) MCG/ACT inhaler Inhale 2 puffs into the lungs 4 times daily as needed for Wheezing 54 g 1    buPROPion (WELLBUTRIN XL) 300 MG extended release tablet Take 1 tablet by mouth every morning      TRIUMEQ 600- MG TABS       PREZCOBIX 800-150 MG TABS tablet Take 1 tablet by mouth daily      azelastine (ASTELIN) 0.1 % nasal spray 1 spray by Nasal route 2 times daily Use in each nostril as directed 30 mL 3    D3 HIGH POTENCY 50 MCG (2000 UT) CAPS       LIDODERM 5 % Apply 1 patch on the skin daily as needed for pain       No current facility-administered medications for this visit.        Allergies  Allergies   Allergen Reactions    Amoxicillin Other (See Comments)     unknown    Asa [Aspirin]      Sent him to ER    Mirtazapine Other (See Comments)     unknown    Other      SOME HIV MEDICATION, DONT KNOW NAME     Ziagen [Abacavir]     Bactrim [Sulfamethoxazole-Trimethoprim] Rash        Review of Systems  System  Neg/Pos  Details  Constitutional  Negative  Chills, Fatigue, Fever and Night Sweats  Respiratory  Negative  Chest Pain, Cough and Dyspnea  Cardio   Negative  Leg Swelling  GI   Negative  Abdominal Pain, Constipation, Nausea and Vomiting     Negative  Urinary Incontinence   Endocrine  Negative  Weight Gain and Weight Loss  MS   Negative  Except as noted in HPI and Chief Complaint    Ht 1.702 m (5' 7\")   Wt 81.6 kg (180 lb)   BMI 28.19 kg/m²      Physical Exam   Physical Examination:  General: The patient is a Well Nourished 55 y.o. male who is calm, no acute distress.  Psychological: Appropriate mood and affect  HEENT: Normocephalic, Atraumatic. No gross visual or auditory acuity deficits.   Respiratory: Rate and effort within normal limits.   Vascular:

## 2025-03-18 ENCOUNTER — OFFICE VISIT (OUTPATIENT)
Age: 55
End: 2025-03-18
Payer: MEDICAID

## 2025-03-18 VITALS — WEIGHT: 180 LBS | HEIGHT: 67 IN | BODY MASS INDEX: 28.25 KG/M2

## 2025-03-18 DIAGNOSIS — M18.12 LOCALIZED PRIMARY OSTEOARTHRITIS OF CARPOMETACARPAL JOINT OF LEFT THUMB: Primary | ICD-10-CM

## 2025-03-18 PROCEDURE — 3017F COLORECTAL CA SCREEN DOC REV: CPT | Performed by: ORTHOPAEDIC SURGERY

## 2025-03-18 PROCEDURE — 4004F PT TOBACCO SCREEN RCVD TLK: CPT | Performed by: ORTHOPAEDIC SURGERY

## 2025-03-18 PROCEDURE — G8427 DOCREV CUR MEDS BY ELIG CLIN: HCPCS | Performed by: ORTHOPAEDIC SURGERY

## 2025-03-18 PROCEDURE — 99213 OFFICE O/P EST LOW 20 MIN: CPT | Performed by: ORTHOPAEDIC SURGERY

## 2025-03-18 PROCEDURE — G8419 CALC BMI OUT NRM PARAM NOF/U: HCPCS | Performed by: ORTHOPAEDIC SURGERY

## 2025-04-04 SDOH — ECONOMIC STABILITY: FOOD INSECURITY: WITHIN THE PAST 12 MONTHS, YOU WORRIED THAT YOUR FOOD WOULD RUN OUT BEFORE YOU GOT MONEY TO BUY MORE.: NEVER TRUE

## 2025-04-04 SDOH — ECONOMIC STABILITY: FOOD INSECURITY: WITHIN THE PAST 12 MONTHS, THE FOOD YOU BOUGHT JUST DIDN'T LAST AND YOU DIDN'T HAVE MONEY TO GET MORE.: NEVER TRUE

## 2025-04-04 SDOH — ECONOMIC STABILITY: INCOME INSECURITY: IN THE LAST 12 MONTHS, WAS THERE A TIME WHEN YOU WERE NOT ABLE TO PAY THE MORTGAGE OR RENT ON TIME?: NO

## 2025-04-04 SDOH — ECONOMIC STABILITY: TRANSPORTATION INSECURITY
IN THE PAST 12 MONTHS, HAS LACK OF TRANSPORTATION KEPT YOU FROM MEETINGS, WORK, OR FROM GETTING THINGS NEEDED FOR DAILY LIVING?: NO

## 2025-04-04 SDOH — ECONOMIC STABILITY: TRANSPORTATION INSECURITY
IN THE PAST 12 MONTHS, HAS THE LACK OF TRANSPORTATION KEPT YOU FROM MEDICAL APPOINTMENTS OR FROM GETTING MEDICATIONS?: NO

## 2025-04-07 ENCOUNTER — OFFICE VISIT (OUTPATIENT)
Dept: PRIMARY CARE CLINIC | Age: 55
End: 2025-04-07
Payer: MEDICAID

## 2025-04-07 VITALS
DIASTOLIC BLOOD PRESSURE: 82 MMHG | HEART RATE: 110 BPM | WEIGHT: 151.4 LBS | TEMPERATURE: 97.4 F | SYSTOLIC BLOOD PRESSURE: 122 MMHG | HEIGHT: 67 IN | OXYGEN SATURATION: 98 % | BODY MASS INDEX: 23.76 KG/M2

## 2025-04-07 DIAGNOSIS — E78.1 HYPERTRIGLYCERIDEMIA: Primary | ICD-10-CM

## 2025-04-07 DIAGNOSIS — E03.9 ACQUIRED HYPOTHYROIDISM: ICD-10-CM

## 2025-04-07 PROCEDURE — 4004F PT TOBACCO SCREEN RCVD TLK: CPT | Performed by: FAMILY MEDICINE

## 2025-04-07 PROCEDURE — G8420 CALC BMI NORM PARAMETERS: HCPCS | Performed by: FAMILY MEDICINE

## 2025-04-07 PROCEDURE — 99214 OFFICE O/P EST MOD 30 MIN: CPT | Performed by: FAMILY MEDICINE

## 2025-04-07 PROCEDURE — 3017F COLORECTAL CA SCREEN DOC REV: CPT | Performed by: FAMILY MEDICINE

## 2025-04-07 PROCEDURE — G8427 DOCREV CUR MEDS BY ELIG CLIN: HCPCS | Performed by: FAMILY MEDICINE

## 2025-04-07 SDOH — ECONOMIC STABILITY: FOOD INSECURITY: WITHIN THE PAST 12 MONTHS, THE FOOD YOU BOUGHT JUST DIDN'T LAST AND YOU DIDN'T HAVE MONEY TO GET MORE.: NEVER TRUE

## 2025-04-07 SDOH — ECONOMIC STABILITY: FOOD INSECURITY: WITHIN THE PAST 12 MONTHS, YOU WORRIED THAT YOUR FOOD WOULD RUN OUT BEFORE YOU GOT MONEY TO BUY MORE.: NEVER TRUE

## 2025-04-07 NOTE — PROGRESS NOTES
Reason For Visit:   Mixed Hyperlipidemia.     Combined HPI and A/P:      Diagnosis Orders   1. Hypertriglyceridemia  rosuvastatin (CRESTOR) 10 MG tablet    Lipid Panel    Hepatic Function Panel      2. Acquired hypothyroidism  Basic Metabolic Panel    TSH reflex to FT4          1.)  Mixed hyperlipidemia: This is a chronic, stable problem.  The patient is currently taking Crestor 5 mg 1 tablet nightly.  I will increase this to Crestor 10 mg 1 tablet nightly.  I will order lipid panel and a CMP to evaluate lipid control monitor for side effects.        Return for previously scheduled appointment.    We discussed use, benefit, and side effects of prescribed medications.  All patient questions answered.   Patient agreed with treatment plan.   I reviewed available records in our system and care everywhere. In cases where records are not available, the records have been requested and will be reviewed when available.     Subjective      Past Surgical History:   Procedure Laterality Date    APPENDECTOMY      COLONOSCOPY  12/09/2008    Dr Rodas    HAND SURGERY      MANDIBLE SURGERY Bilateral     had it wired shut    OTHER SURGICAL HISTORY  7/24/13, 1/24/14    cauterization of per-anal condyloma     Family History   Problem Relation Age of Onset    Diabetes Mother     Heart Disease Father     Cancer Father         liver    Lung Cancer Other         uncles on both sides     Social History     Tobacco Use    Smoking status: Every Day     Current packs/day: 1.50     Average packs/day: 1.5 packs/day for 34.0 years (51.0 ttl pk-yrs)     Types: Cigarettes    Smokeless tobacco: Never   Substance Use Topics    Alcohol use: No      Current Outpatient Medications   Medication Sig Dispense Refill    rosuvastatin (CRESTOR) 10 MG tablet Take 1 tablet by mouth nightly 30 tablet 3    tadalafil (CIALIS) 20 MG tablet Take 1 tablet by mouth daily as needed for Erectile Dysfunction 30 tablet 0    atomoxetine (STRATTERA) 25 MG capsule

## 2025-04-14 RX ORDER — ROSUVASTATIN CALCIUM 10 MG/1
10 TABLET, COATED ORAL NIGHTLY
Qty: 30 TABLET | Refills: 3 | Status: SHIPPED | OUTPATIENT
Start: 2025-04-14 | End: 2025-04-15 | Stop reason: SDUPTHER

## 2025-04-14 ASSESSMENT — ENCOUNTER SYMPTOMS
SHORTNESS OF BREATH: 0
ABDOMINAL PAIN: 0
COUGH: 0
TROUBLE SWALLOWING: 0
VOMITING: 0
DIARRHEA: 0
CONSTIPATION: 0
NAUSEA: 0

## 2025-04-15 DIAGNOSIS — E78.1 HYPERTRIGLYCERIDEMIA: ICD-10-CM

## 2025-04-15 NOTE — TELEPHONE ENCOUNTER
Preston Hernandez called to request a refill on his medication.      Last office visit : 4/7/2025   Next office visit : 5/20/2025     Requested Prescriptions     Pending Prescriptions Disp Refills    rosuvastatin (CRESTOR) 10 MG tablet 30 tablet 0     Sig: Take 1 tablet by mouth nightly            Naz Singletary MA

## 2025-04-16 RX ORDER — ROSUVASTATIN CALCIUM 10 MG/1
10 TABLET, COATED ORAL NIGHTLY
Qty: 30 TABLET | Refills: 0 | Status: SHIPPED | OUTPATIENT
Start: 2025-04-16 | End: 2025-05-16

## 2025-05-20 ENCOUNTER — RESULTS FOLLOW-UP (OUTPATIENT)
Dept: PRIMARY CARE CLINIC | Age: 55
End: 2025-05-20

## 2025-05-20 DIAGNOSIS — E03.9 ACQUIRED HYPOTHYROIDISM: ICD-10-CM

## 2025-05-20 DIAGNOSIS — E78.1 HYPERTRIGLYCERIDEMIA: ICD-10-CM

## 2025-05-20 LAB
ALBUMIN SERPL-MCNC: 4.5 G/DL (ref 3.5–5.2)
ALP SERPL-CCNC: 64 U/L (ref 40–129)
ALT SERPL-CCNC: 20 U/L (ref 10–50)
ANION GAP SERPL CALCULATED.3IONS-SCNC: 11 MMOL/L (ref 8–16)
AST SERPL-CCNC: 22 U/L (ref 10–50)
BILIRUB DIRECT SERPL-MCNC: <0.1 MG/DL (ref 0–0.3)
BILIRUB INDIRECT SERPL-MCNC: 0.1 MG/DL (ref 0–1)
BILIRUB SERPL-MCNC: 0.2 MG/DL (ref 0.2–1.2)
BUN SERPL-MCNC: 15 MG/DL (ref 6–20)
CALCIUM SERPL-MCNC: 9.8 MG/DL (ref 8.6–10)
CHLORIDE SERPL-SCNC: 104 MMOL/L (ref 98–107)
CHOLEST SERPL-MCNC: 156 MG/DL (ref 0–199)
CO2 SERPL-SCNC: 26 MMOL/L (ref 22–29)
CREAT SERPL-MCNC: 1 MG/DL (ref 0.7–1.2)
GLUCOSE SERPL-MCNC: 101 MG/DL (ref 70–99)
HDLC SERPL-MCNC: 28 MG/DL (ref 40–60)
LDLC SERPL CALC-MCNC: 56 MG/DL
POTASSIUM SERPL-SCNC: 4.1 MMOL/L (ref 3.5–5.1)
PROT SERPL-MCNC: 7.5 G/DL (ref 6.4–8.3)
SODIUM SERPL-SCNC: 141 MMOL/L (ref 136–145)
TRIGL SERPL-MCNC: 361 MG/DL (ref 0–149)
TSH SERPL DL<=0.005 MIU/L-ACNC: 2.58 UIU/ML (ref 0.27–4.2)

## 2025-06-03 ENCOUNTER — OFFICE VISIT (OUTPATIENT)
Dept: PRIMARY CARE CLINIC | Age: 55
End: 2025-06-03
Payer: MEDICAID

## 2025-06-03 VITALS
HEIGHT: 67 IN | HEART RATE: 89 BPM | OXYGEN SATURATION: 98 % | BODY MASS INDEX: 23.95 KG/M2 | TEMPERATURE: 97.9 F | WEIGHT: 152.6 LBS | SYSTOLIC BLOOD PRESSURE: 116 MMHG | DIASTOLIC BLOOD PRESSURE: 70 MMHG

## 2025-06-03 DIAGNOSIS — G89.29 CHRONIC BILATERAL LOW BACK PAIN WITHOUT SCIATICA: ICD-10-CM

## 2025-06-03 DIAGNOSIS — R19.7 DIARRHEA, UNSPECIFIED TYPE: ICD-10-CM

## 2025-06-03 DIAGNOSIS — E78.1 HYPERTRIGLYCERIDEMIA: ICD-10-CM

## 2025-06-03 DIAGNOSIS — R10.11 RIGHT UPPER QUADRANT PAIN: ICD-10-CM

## 2025-06-03 DIAGNOSIS — Z76.89 ENCOUNTER TO ESTABLISH CARE: Primary | ICD-10-CM

## 2025-06-03 DIAGNOSIS — M54.50 CHRONIC BILATERAL LOW BACK PAIN WITHOUT SCIATICA: ICD-10-CM

## 2025-06-03 DIAGNOSIS — E03.9 ACQUIRED HYPOTHYROIDISM: ICD-10-CM

## 2025-06-03 DIAGNOSIS — R82.90 MALODOROUS URINE: ICD-10-CM

## 2025-06-03 DIAGNOSIS — R53.82 CHRONIC FATIGUE: ICD-10-CM

## 2025-06-03 DIAGNOSIS — Z21 ASYMPTOMATIC HIV INFECTION, WITH NO HISTORY OF HIV-RELATED ILLNESS (HCC): ICD-10-CM

## 2025-06-03 LAB
APPEARANCE FLUID: NORMAL
BILIRUBIN, POC: NORMAL
BLOOD URINE, POC: NORMAL
CLARITY, POC: NORMAL
COLOR, POC: NORMAL
GLUCOSE URINE, POC: NORMAL MG/DL
KETONES, POC: NORMAL MG/DL
LEUKOCYTE EST, POC: NORMAL
NITRITE, POC: NORMAL
PH, POC: 6
PROTEIN, POC: NORMAL MG/DL
SPECIFIC GRAVITY, POC: >=1.03
UROBILINOGEN, POC: 0.2 MG/DL

## 2025-06-03 PROCEDURE — 99215 OFFICE O/P EST HI 40 MIN: CPT | Performed by: NURSE PRACTITIONER

## 2025-06-03 PROCEDURE — 3017F COLORECTAL CA SCREEN DOC REV: CPT | Performed by: NURSE PRACTITIONER

## 2025-06-03 PROCEDURE — G8427 DOCREV CUR MEDS BY ELIG CLIN: HCPCS | Performed by: NURSE PRACTITIONER

## 2025-06-03 PROCEDURE — G8420 CALC BMI NORM PARAMETERS: HCPCS | Performed by: NURSE PRACTITIONER

## 2025-06-03 PROCEDURE — 81002 URINALYSIS NONAUTO W/O SCOPE: CPT | Performed by: NURSE PRACTITIONER

## 2025-06-03 PROCEDURE — 4004F PT TOBACCO SCREEN RCVD TLK: CPT | Performed by: NURSE PRACTITIONER

## 2025-06-03 RX ORDER — ROSUVASTATIN CALCIUM 10 MG/1
10 TABLET, COATED ORAL NIGHTLY
Qty: 90 TABLET | Refills: 1 | Status: SHIPPED | OUTPATIENT
Start: 2025-06-03 | End: 2025-07-03

## 2025-06-03 RX ORDER — ATOMOXETINE 40 MG/1
40 CAPSULE ORAL DAILY
COMMUNITY
Start: 2025-04-30

## 2025-06-03 ASSESSMENT — ENCOUNTER SYMPTOMS
COUGH: 0
VOMITING: 0
BACK PAIN: 0
RHINORRHEA: 0
COLOR CHANGE: 0
PHOTOPHOBIA: 0
NAUSEA: 0
DIARRHEA: 1
VOICE CHANGE: 0
SHORTNESS OF BREATH: 0

## 2025-06-03 NOTE — PROGRESS NOTES
Monitoring Possible medication side effects, risk of tolerance/dependence & alternative treatments discussed., No signs of potential drug abuse or diversion identified. filed at 07/29/2024 1520          Urine Drug Screenings (1 yr)    No resulted procedures found.       Medication Contract and Consent for Opioid Use Documents Filed        No documents found                     Patient given educational materials -see patient instructions.  Discussed use, benefit, and side effects of prescribed medications.  All patient questions answered.  Pt voiced understanding. Reviewed health maintenance.  Instructed to continue currentmedications, diet and exercise.  Patient agreed with treatment plan. Follow up as directed.   MEDICATIONS:  Orders Placed This Encounter   Medications    rosuvastatin (CRESTOR) 10 MG tablet     Sig: Take 1 tablet by mouth nightly     Dispense:  90 tablet     Refill:  1    tiZANidine (ZANAFLEX) 4 MG tablet     Sig: Take 1 tablet by mouth 3 times daily as needed (back pain)     Dispense:  30 tablet     Refill:  0         ORDERS:  Orders Placed This Encounter   Procedures    NM HEPATOBILIARY SCAN W EJECTION FRACTION    CBC with Auto Differential    Comprehensive Metabolic Panel    Hemoglobin A1C    Lipid Panel    TSH    Testosterone    POCT Urinalysis no Micro       Follow-up:  No follow-ups on file.    PATIENT INSTRUCTIONS:  There are no Patient Instructions on file for this visit.  Electronically signed by JUAN LUIS Hicks on 6/3/2025 at 4:19 PM    EMR Dragon/transcription disclaimer:  Much of thisencounter note is electronic transcription/translation of spoken language to printed texts.  The electronic translation of spoken language may be erroneous, or at times, nonsensical words or phrases may be inadvertentlytranscribed.  Although I have reviewed the note for such errors, some may still exist.    The patient (or guardian, if applicable) and other individuals in attendance with the patient

## 2025-06-10 ENCOUNTER — RESULTS FOLLOW-UP (OUTPATIENT)
Dept: PRIMARY CARE CLINIC | Age: 55
End: 2025-06-10

## 2025-06-11 ENCOUNTER — OFFICE VISIT (OUTPATIENT)
Dept: GASTROENTEROLOGY | Facility: CLINIC | Age: 55
End: 2025-06-11
Payer: COMMERCIAL

## 2025-06-11 VITALS
HEART RATE: 83 BPM | WEIGHT: 159 LBS | BODY MASS INDEX: 24.96 KG/M2 | TEMPERATURE: 97.8 F | OXYGEN SATURATION: 96 % | SYSTOLIC BLOOD PRESSURE: 124 MMHG | HEIGHT: 67 IN | DIASTOLIC BLOOD PRESSURE: 64 MMHG

## 2025-06-11 DIAGNOSIS — R15.9 INCONTINENCE OF FECES WITH FECAL URGENCY: Primary | ICD-10-CM

## 2025-06-11 DIAGNOSIS — R15.2 INCONTINENCE OF FECES WITH FECAL URGENCY: Primary | ICD-10-CM

## 2025-06-11 PROCEDURE — 1160F RVW MEDS BY RX/DR IN RCRD: CPT | Performed by: NURSE PRACTITIONER

## 2025-06-11 PROCEDURE — 99213 OFFICE O/P EST LOW 20 MIN: CPT | Performed by: NURSE PRACTITIONER

## 2025-06-11 PROCEDURE — 1159F MED LIST DOCD IN RCRD: CPT | Performed by: NURSE PRACTITIONER

## 2025-06-11 NOTE — PROGRESS NOTES
[facility] GASTROENTEROLOGY - OFFICE NOTE    6/11/2025    Alex Ghotra   1970    Primary Physician: Erica Riggs APRN    Chief Complaint   Patient presents with    GI Problem     Soft stool, incontinence          HISTORY OF PRESENT ILLNESS:     Alex Ghotra is a 55 y.o. male presents with abdominal pain and diarrhea. He denies abdominal pain. His main complaint is fecal leakage intermittently. This started over a year ago. The last episode was after eating at [employer] ( fried foods ).  He gets the urgency and may not make it to the restroom. No rectal bleeding. No weight loss. No constipation or diarrhea. No fever. No abdominal pain.       COLONOSCOPY (06/09/2022 11:11) recall 10 years.   Upper GI Endoscopy (08/06/2024 08:14)         Past Medical History:   Diagnosis Date    ADHD (attention deficit hyperactivity disorder)     AIDS     Asthma     Chronic pain of left thumb 12/2024    COPD (chronic obstructive pulmonary disease)     Disease of thyroid gland     GERD (gastroesophageal reflux disease)     Hep C w/o coma, chronic     treated at [facility], was told cleared the virus.    Hyperlipidemia        Past Surgical History:   Procedure Laterality Date    APPENDECTOMY      COLONOSCOPY      2013? ([facility]) polyps    COLONOSCOPY N/A 06/09/2022    Procedure: COLONOSCOPY WITH ANESTHESIA;  Surgeon: Michael Israel MD;  Location: [facility] ENDOSCOPY;  Service: Gastroenterology;  Laterality: N/A;  pre hx colon polyp  post; normal  Alice Anderson MD    ENDOSCOPY      ENDOSCOPY N/A 06/09/2022    Procedure: ESOPHAGOGASTRODUODENOSCOPY WITH ANESTHESIA;  Surgeon: Michael Israel MD;  Location: [facility] ENDOSCOPY;  Service: Gastroenterology;  Laterality: N/A;  pre dysphagia  post candidiasis; stricture dilated  Alice Anderson MD    ENDOSCOPY N/A 03/14/2023    Procedure: ESOPHAGOGASTRODUODENOSCOPY WITH ANESTHESIA;  Surgeon: Michael Israel MD;  Location: [facility] ENDOSCOPY;   Service: Gastroenterology;  Laterality: N/A;  Pre: Epigastric pain, Dysphagia, Abnormal CT of the abdomen  Post: esophagitis   Alice Anderson MD    ENDOSCOPY N/A 8/6/2024    Procedure: ESOPHAGOGASTRODUODENOSCOPY WITH ANESTHESIA;  Surgeon: Michael Israel MD;  Location: [facility] ENDOSCOPY;  Service: Gastroenterology;  Laterality: N/A;  preop; dysphagia  postop r/o candida; r/o barretts; esophageal stricture   PCP Basil Cano    FINGER/THUMB ARTHROPLASTY Left 12/18/2024    Procedure: LEFT THUMB CARPOMETACARPAL ARTHROPLASTY;  Surgeon: Bill Moses MD;  Location: [facility] OR;  Service: Orthopedics;  Laterality: Left;    MANDIBLE FRACTURE SURGERY         Outpatient Medications Marked as Taking for the 6/11/25 encounter (Office Visit) with Latricia aCmacho APRN   Medication Sig Dispense Refill    albuterol sulfate  (90 Base) MCG/ACT inhaler Inhale 2 puffs Every 4 (Four) Hours As Needed for Wheezing or Shortness of Air (Rinse Mouth after use). 6.7 g 0    atomoxetine (STRATTERA) 25 MG capsule Take 1 capsule by mouth Every Morning.      buPROPion XL (WELLBUTRIN XL) 300 MG 24 hr tablet Take 1 tablet by mouth Every Morning.      Cholecalciferol (Vitamin D3) 50 MCG (2000 UT) capsule Take 1 capsule by mouth Daily.      esomeprazole (nexIUM) 20 MG capsule Take 1 capsule by mouth Every Morning Before Breakfast.      Prezcobix 800-150 MG per tablet Take 1 tablet by mouth Daily.      rosuvastatin (CRESTOR) 5 MG tablet Take 1 tablet by mouth Every Night.      Triumeq 600- MG per tablet Take 1 tablet by mouth daily; Take 2 hours before or 6 hours after Calcium, Antacids or Vitamins         Allergies   Allergen Reactions    Ziagen [Abacavir] Anaphylaxis    Amoxicillin Other (See Comments)     Unknown, believes a rash    Other Other (See Comments)     SOME HIV MEDICATION, DONT KNOW NAME     Remeron [Mirtazapine] Other (See Comments)     nightmares    Bactrim [Sulfamethoxazole-Trimethoprim] Rash       Social  "History     Socioeconomic History    Marital status:    Tobacco Use    Smoking status: Former     Current packs/day: 0.50     Average packs/day: 0.5 packs/day for 40.0 years (20.0 ttl pk-yrs)     Types: Cigarettes     Passive exposure: Current    Smokeless tobacco: Current     Types: Snuff    Tobacco comments:     Just quit   Vaping Use    Vaping status: Every Day    Substances: Nicotine    Devices: Disposable    Passive vaping exposure: Yes   Substance and Sexual Activity    Alcohol use: No    Drug use: Not Currently     Types: Methamphetamines, Marijuana, Heroin, LSD, Cocaine(coke)     Comment: Clean for 22 months    Sexual activity: Defer       Family History   Problem Relation Age of Onset    Heart attack Mother     Diabetes Mother     Heart attack Father     Liver cancer Father     Colon cancer Neg Hx     Colon polyps Neg Hx        Review of Systems   Constitutional:  Negative for chills, fever and unexpected weight change.   Gastrointestinal:  Negative for abdominal distention, abdominal pain, anal bleeding, blood in stool, constipation, nausea and vomiting.        Vitals:    06/11/25 0842   BP: 124/64   Pulse: 83   Temp: 97.8 °F (36.6 °C)   SpO2: 96%   Weight: 72.1 kg (159 lb)   Height: 170.2 cm (67\")      Body mass index is 24.9 kg/m².    Physical Exam  Vitals reviewed.   Constitutional:       General: He is not in acute distress.  Cardiovascular:      Rate and Rhythm: Normal rate and regular rhythm.      Heart sounds: Normal heart sounds.   Pulmonary:      Effort: Pulmonary effort is normal.      Breath sounds: Normal breath sounds.   Abdominal:      General: Bowel sounds are normal. There is no distension.      Palpations: Abdomen is soft.      Tenderness: There is no abdominal tenderness.   Skin:     General: Skin is warm and dry.   Neurological:      Mental Status: He is alert.         Results for orders placed or performed during the hospital encounter of 02/10/25   Covid-19 + Flu A&B AG, " Rocio    Collection Time: 02/10/25  8:44 AM    Specimen: Swab   Result Value Ref Range    SARS Antigen Not Detected Not Detected, Presumptive Negative    Influenza A Antigen JUNG Detected (A) Not Detected    Influenza B Antigen JUNG Not Detected Not Detected    Internal Control Passed Passed    Lot Number [id number]     Expiration Date 112,125            ASSESSMENT AND PLAN    Assessment & Plan     Diagnoses and all orders for this visit:    1. Incontinence of feces with fecal urgency (Primary)    Other orders  -     methylcellulose, Laxative, (Citrucel) 500 MG tablet tablet; Take 2 tablets by mouth 6 (Six) Times a Day.  Dispense: 360 tablet; Refill: 11    This is chronic and only occurs occasionally after eating certain foods. The last episode was after eating at [employer] ( had fried foods).  We discussed diet as far as possible culprits.  I recommended trial of citrucel.  He is up to date on colonoscopy.  Follow up if continues to have problems.     * Surgery not found *      No follow-ups on file.          There are no Patient Instructions on file for this visit.      Latricia Camacho APRN

## 2025-06-23 DIAGNOSIS — N52.8 OTHER MALE ERECTILE DYSFUNCTION: ICD-10-CM

## 2025-06-23 RX ORDER — TADALAFIL 20 MG/1
20 TABLET ORAL DAILY PRN
Qty: 30 TABLET | Refills: 0 | Status: SHIPPED | OUTPATIENT
Start: 2025-06-23

## 2025-07-21 ENCOUNTER — TRANSCRIBE ORDERS (OUTPATIENT)
Dept: ADMINISTRATIVE | Facility: HOSPITAL | Age: 55
End: 2025-07-21
Payer: COMMERCIAL

## 2025-07-21 DIAGNOSIS — B18.2 CHRONIC HEPATITIS C WITH HEPATIC COMA: Primary | ICD-10-CM

## 2025-07-21 DIAGNOSIS — B18.2 CHRONIC HEPATITIS C WITHOUT HEPATIC COMA: ICD-10-CM

## 2025-08-25 ENCOUNTER — HOSPITAL ENCOUNTER (OUTPATIENT)
Dept: ULTRASOUND IMAGING | Facility: HOSPITAL | Age: 55
Discharge: HOME | End: 2025-08-25
Admitting: SPECIALIST
Payer: COMMERCIAL

## 2025-08-25 DIAGNOSIS — B18.2 CHRONIC HEPATITIS C WITHOUT HEPATIC COMA: ICD-10-CM

## 2025-08-25 PROCEDURE — 76700 US EXAM ABDOM COMPLETE: CPT

## 2025-08-30 ENCOUNTER — HOSPITAL ENCOUNTER (EMERGENCY)
Facility: HOSPITAL | Age: 55
Discharge: HOME | End: 2025-08-30
Payer: COMMERCIAL

## 2025-08-30 ENCOUNTER — APPOINTMENT (OUTPATIENT)
Dept: GENERAL RADIOLOGY | Facility: HOSPITAL | Age: 55
End: 2025-08-30
Payer: COMMERCIAL

## 2025-08-30 VITALS
TEMPERATURE: 98.3 F | OXYGEN SATURATION: 96 % | BODY MASS INDEX: 25.82 KG/M2 | SYSTOLIC BLOOD PRESSURE: 129 MMHG | HEIGHT: 67 IN | HEART RATE: 99 BPM | DIASTOLIC BLOOD PRESSURE: 89 MMHG | RESPIRATION RATE: 20 BRPM | WEIGHT: 164.5 LBS

## 2025-08-30 DIAGNOSIS — S90.121A CONTUSION OF LESSER TOE OF RIGHT FOOT WITHOUT DAMAGE TO NAIL, INITIAL ENCOUNTER: ICD-10-CM

## 2025-08-30 DIAGNOSIS — M79.674 PAIN OF TOE OF RIGHT FOOT: Primary | ICD-10-CM

## 2025-08-30 PROCEDURE — 73630 X-RAY EXAM OF FOOT: CPT

## 2025-08-30 PROCEDURE — 99283 EMERGENCY DEPT VISIT LOW MDM: CPT

## 2025-09-04 ENCOUNTER — TELEPHONE (OUTPATIENT)
Dept: PRIMARY CARE CLINIC | Age: 55
End: 2025-09-04

## (undated) DEVICE — BNDG ELAS ECON W/CLIP 3IN 5YD LF STRL

## (undated) DEVICE — Device: Brand: DEFENDO AIR/WATER/SUCTION AND BIOPSY VALVE

## (undated) DEVICE — SENSR O2 OXIMAX FNGR A/ 18IN NONSTR

## (undated) DEVICE — GLV SURG DERMASSURE GRN LF PF 8.0

## (undated) DEVICE — INTENDED FOR TISSUE SEPARATION, AND OTHER PROCEDURES THAT REQUIRE A SHARP SURGICAL BLADE TO PUNCTURE OR CUT.: Brand: BARD-PARKER ® STAINLESS STEEL BLADES

## (undated) DEVICE — CUFF,BP,DISP,1 TUBE,ADULT,HP: Brand: MEDLINE

## (undated) DEVICE — SUT ETHLN 4/0 FS2 18IN 662H

## (undated) DEVICE — TBG SMPL FLTR LINE NASL 02/C02 A/ BX/100

## (undated) DEVICE — 4-PORT MANIFOLD: Brand: NEPTUNE 2

## (undated) DEVICE — THE CHANNEL CLEANING BRUSH IS A NYLON FLEXI BRUSH ATTACHED TO A FLEXIBLE PLASTIC SHEATH DESIGNED TO SAFELY REMOVE DEBRIS FROM FLEXIBLE ENDOSCOPES.

## (undated) DEVICE — YANKAUER,BULB TIP WITH VENT: Brand: ARGYLE

## (undated) DEVICE — FRCP BIOP ENDO CAPTURAPRO SPK SERR 2.8MM 230CM

## (undated) DEVICE — ANTIBACTERIAL UNDYED BRAIDED (POLYGLACTIN 910), SYNTHETIC ABSORBABLE SUTURE: Brand: COATED VICRYL

## (undated) DEVICE — GLV SURG SENSICARE W/ALOE PF LF 7.5 STRL

## (undated) DEVICE — MASK,OXYGEN,MED CONC,ADLT,7' TUB, UC: Brand: PENDING

## (undated) DEVICE — DRESSING,GAUZE,XEROFORM,CURAD,5"X9",ST: Brand: CURAD

## (undated) DEVICE — DISPOSABLE TOURNIQUET CUFF 24"X4", 1-LINE, YELLOW, STERILE, 1EA/PK, 10PK/CS: Brand: ASP MEDICAL

## (undated) DEVICE — PADDING,UNDERCAST,COTTON, 3X4YD STERILE: Brand: MEDLINE

## (undated) DEVICE — CONMED SCOPE SAVER BITE BLOCK, 20X27 MM: Brand: SCOPE SAVER

## (undated) DEVICE — CABL BIPOL MEGADYNE 12FT DISP

## (undated) DEVICE — BRUSH CYTOLOGY

## (undated) DEVICE — PK EXTRM 30

## (undated) DEVICE — PATIENT RETURN ELECTRODE, SINGLE-USE, CONTACT QUALITY MONITORING, ADULT, WITH 9FT CORD, FOR PATIENTS WEIGING OVER 33LBS. (15KG): Brand: MEGADYNE

## (undated) DEVICE — KT DRP MINIVIEW STRL

## (undated) DEVICE — CVR BRD ARM 13X30

## (undated) DEVICE — GAUZE,SPONGE,4"X4",12PLY,STERILE,LF,2'S: Brand: MEDLINE